# Patient Record
Sex: MALE | Race: WHITE | NOT HISPANIC OR LATINO | URBAN - METROPOLITAN AREA
[De-identification: names, ages, dates, MRNs, and addresses within clinical notes are randomized per-mention and may not be internally consistent; named-entity substitution may affect disease eponyms.]

---

## 2022-10-05 RX ADMIN — SODIUM CHLORIDE 1000 MILLILITER(S): 9 INJECTION INTRAMUSCULAR; INTRAVENOUS; SUBCUTANEOUS at 21:00

## 2022-10-06 ENCOUNTER — INPATIENT (INPATIENT)
Facility: HOSPITAL | Age: 87
LOS: 5 days | Discharge: HOME | DRG: 175 | End: 2022-10-12
Attending: STUDENT IN AN ORGANIZED HEALTH CARE EDUCATION/TRAINING PROGRAM | Admitting: STUDENT IN AN ORGANIZED HEALTH CARE EDUCATION/TRAINING PROGRAM
Payer: MEDICARE

## 2022-10-06 VITALS
DIASTOLIC BLOOD PRESSURE: 80 MMHG | WEIGHT: 199.96 LBS | SYSTOLIC BLOOD PRESSURE: 120 MMHG | HEART RATE: 81 BPM | RESPIRATION RATE: 18 BRPM | OXYGEN SATURATION: 95 % | TEMPERATURE: 97 F

## 2022-10-06 LAB
ALBUMIN SERPL ELPH-MCNC: 3.4 G/DL — SIGNIFICANT CHANGE UP (ref 3.3–5)
ALP SERPL-CCNC: 81 U/L — SIGNIFICANT CHANGE UP (ref 40–120)
ALT FLD-CCNC: 54 U/L — SIGNIFICANT CHANGE UP (ref 12–78)
ANION GAP SERPL CALC-SCNC: 9 MMOL/L — SIGNIFICANT CHANGE UP (ref 5–17)
AST SERPL-CCNC: 31 U/L — SIGNIFICANT CHANGE UP (ref 15–37)
BASOPHILS # BLD AUTO: 0.02 K/UL — SIGNIFICANT CHANGE UP (ref 0–0.2)
BASOPHILS NFR BLD AUTO: 0.1 % — SIGNIFICANT CHANGE UP (ref 0–2)
BILIRUB SERPL-MCNC: 0.9 MG/DL — SIGNIFICANT CHANGE UP (ref 0.2–1.2)
BUN SERPL-MCNC: 25 MG/DL — HIGH (ref 7–23)
CALCIUM SERPL-MCNC: 9.5 MG/DL — SIGNIFICANT CHANGE UP (ref 8.5–10.1)
CHLORIDE SERPL-SCNC: 104 MMOL/L — SIGNIFICANT CHANGE UP (ref 96–108)
CO2 SERPL-SCNC: 25 MMOL/L — SIGNIFICANT CHANGE UP (ref 22–31)
CREAT SERPL-MCNC: 1.2 MG/DL — SIGNIFICANT CHANGE UP (ref 0.5–1.3)
D DIMER BLD IA.RAPID-MCNC: 3527 NG/ML DDU — HIGH
EGFR: 59 ML/MIN/1.73M2 — LOW
EOSINOPHIL # BLD AUTO: 0 K/UL — SIGNIFICANT CHANGE UP (ref 0–0.5)
EOSINOPHIL NFR BLD AUTO: 0 % — SIGNIFICANT CHANGE UP (ref 0–6)
FLUAV AG NPH QL: SIGNIFICANT CHANGE UP
FLUBV AG NPH QL: SIGNIFICANT CHANGE UP
GLUCOSE SERPL-MCNC: 143 MG/DL — HIGH (ref 70–99)
HCT VFR BLD CALC: 39.5 % — SIGNIFICANT CHANGE UP (ref 39–50)
HGB BLD-MCNC: 12.8 G/DL — LOW (ref 13–17)
IMM GRANULOCYTES NFR BLD AUTO: 0.8 % — SIGNIFICANT CHANGE UP (ref 0–0.9)
LIDOCAIN IGE QN: 54 U/L — LOW (ref 73–393)
LYMPHOCYTES # BLD AUTO: 1.83 K/UL — SIGNIFICANT CHANGE UP (ref 1–3.3)
LYMPHOCYTES # BLD AUTO: 11 % — LOW (ref 13–44)
MCHC RBC-ENTMCNC: 29.8 PG — SIGNIFICANT CHANGE UP (ref 27–34)
MCHC RBC-ENTMCNC: 32.4 GM/DL — SIGNIFICANT CHANGE UP (ref 32–36)
MCV RBC AUTO: 92.1 FL — SIGNIFICANT CHANGE UP (ref 80–100)
MONOCYTES # BLD AUTO: 2.22 K/UL — HIGH (ref 0–0.9)
MONOCYTES NFR BLD AUTO: 13.3 % — SIGNIFICANT CHANGE UP (ref 2–14)
NEUTROPHILS # BLD AUTO: 12.49 K/UL — HIGH (ref 1.8–7.4)
NEUTROPHILS NFR BLD AUTO: 74.8 % — SIGNIFICANT CHANGE UP (ref 43–77)
NRBC # BLD: 0 /100 WBCS — SIGNIFICANT CHANGE UP (ref 0–0)
NT-PROBNP SERPL-SCNC: 158 PG/ML — SIGNIFICANT CHANGE UP (ref 0–450)
PLATELET # BLD AUTO: 289 K/UL — SIGNIFICANT CHANGE UP (ref 150–400)
POTASSIUM SERPL-MCNC: 4.2 MMOL/L — SIGNIFICANT CHANGE UP (ref 3.5–5.3)
POTASSIUM SERPL-SCNC: 4.2 MMOL/L — SIGNIFICANT CHANGE UP (ref 3.5–5.3)
PROT SERPL-MCNC: 8.3 G/DL — SIGNIFICANT CHANGE UP (ref 6–8.3)
RBC # BLD: 4.29 M/UL — SIGNIFICANT CHANGE UP (ref 4.2–5.8)
RBC # FLD: 13.1 % — SIGNIFICANT CHANGE UP (ref 10.3–14.5)
RSV RNA NPH QL NAA+NON-PROBE: SIGNIFICANT CHANGE UP
SARS-COV-2 RNA SPEC QL NAA+PROBE: SIGNIFICANT CHANGE UP
SODIUM SERPL-SCNC: 138 MMOL/L — SIGNIFICANT CHANGE UP (ref 135–145)
TROPONIN I, HIGH SENSITIVITY RESULT: 32.4 NG/L — SIGNIFICANT CHANGE UP
WBC # BLD: 16.69 K/UL — HIGH (ref 3.8–10.5)
WBC # FLD AUTO: 16.69 K/UL — HIGH (ref 3.8–10.5)

## 2022-10-06 PROCEDURE — 93010 ELECTROCARDIOGRAM REPORT: CPT

## 2022-10-06 PROCEDURE — 99291 CRITICAL CARE FIRST HOUR: CPT | Mod: CS

## 2022-10-06 PROCEDURE — 71045 X-RAY EXAM CHEST 1 VIEW: CPT | Mod: 26

## 2022-10-06 RX ORDER — SODIUM CHLORIDE 9 MG/ML
1000 INJECTION INTRAMUSCULAR; INTRAVENOUS; SUBCUTANEOUS ONCE
Refills: 0 | Status: COMPLETED | OUTPATIENT
Start: 2022-10-06 | End: 2022-10-06

## 2022-10-06 RX ORDER — PANTOPRAZOLE SODIUM 20 MG/1
40 TABLET, DELAYED RELEASE ORAL ONCE
Refills: 0 | Status: COMPLETED | OUTPATIENT
Start: 2022-10-06 | End: 2022-10-06

## 2022-10-06 RX ADMIN — SODIUM CHLORIDE 1000 MILLILITER(S): 9 INJECTION INTRAMUSCULAR; INTRAVENOUS; SUBCUTANEOUS at 20:00

## 2022-10-06 RX ADMIN — PANTOPRAZOLE SODIUM 40 MILLIGRAM(S): 20 TABLET, DELAYED RELEASE ORAL at 20:00

## 2022-10-06 NOTE — ED PROVIDER NOTE - PROGRESS NOTE DETAILS
Patient reassessed.  Found to have bilateral PE with pneumonia.  Also found to be febrile here.  Started on broad-spectrum antibiotics.  We will plan to start heparin drip.  initial trop and probnp wnl. I spoke with ICU PA who recommended that I call for possible transfer.  I spoke to [facility] team dr. pearl as well as [facility] team dr. ashby who recommend keeping patient here for heparin drip as his vital signs are stable at this time.  I updated ICU PA as well as Dr. Rodríguez.  Plan to admit to ICU.

## 2022-10-06 NOTE — ED PROVIDER NOTE - ATTENDING CONTRIBUTION TO CARE
Upon my evaluation, this patient has a high probability of imminent or life-threatening deterioration which required my direct attention, intervention and personal management.  I have personally provided the amount of critical care time documented above excluding time spent on separate procedures. Elements for critical care include direct patient care (not related to procedure), additional history taking, interpretation of diagnostic studies, documentation, consultation with other physicians.

## 2022-10-06 NOTE — ED ADULT NURSE NOTE - NS ED NURSE LEVEL OF CONSCIOUSNESS ORIENTATION
What Type Of Note Output Would You Prefer (Optional)?: Standard Output Hpi Title: Evaluation of Skin Lesions How Severe Are Your Spot(S)?: mild Have Your Spot(S) Been Treated In The Past?: has not been treated Disoriented

## 2022-10-06 NOTE — ED ADULT NURSE REASSESSMENT NOTE - NS ED NURSE REASSESS COMMENT FT1
Pt received from CARON Johansen. Pt is confused. Pt is going down to CT scan now. plan of care updated with pt. will reassess.

## 2022-10-06 NOTE — ED PROVIDER NOTE - UNABLE TO OBTAIN
pt with confusion, hx memory issues - history provided via phone by wife Lima, also received fax regarding pulmonologist visit Dementia

## 2022-10-06 NOTE — ED PROVIDER NOTE - CLINICAL SUMMARY MEDICAL DECISION MAKING FREE TEXT BOX
I, Junior Lane MD, have seen and examined the patient on the date of service.  I agree with the JULIETA's assessment as written, with exceptions or additions as noted below or in a separate note. pt with pmh of hld and dementia is presenting with abdominal pain and sob. started a few days ago with some confusion as well. saw his physician who started abx and medicine for gastritis, however with persistent symptoms sent to ed for evaluation. his abdominal pain is worse with palpation and inspiration. on exam has epigastric ttp. a/p: with epigastric ttp, will eval for pancreatitis vs gb pathology. possible PE as well given he is endorsing pain worse with inspiration. will check trop for acs as well. given recent pna, plan to check xray for that as well. will obtain labs/ct and reassess.

## 2022-10-07 ENCOUNTER — TRANSCRIPTION ENCOUNTER (OUTPATIENT)
Age: 87
End: 2022-10-07

## 2022-10-07 DIAGNOSIS — F03.90 UNSPECIFIED DEMENTIA WITHOUT BEHAVIORAL DISTURBANCE: ICD-10-CM

## 2022-10-07 DIAGNOSIS — Z98.890 OTHER SPECIFIED POSTPROCEDURAL STATES: Chronic | ICD-10-CM

## 2022-10-07 DIAGNOSIS — I26.99 OTHER PULMONARY EMBOLISM WITHOUT ACUTE COR PULMONALE: ICD-10-CM

## 2022-10-07 DIAGNOSIS — Z29.9 ENCOUNTER FOR PROPHYLACTIC MEASURES, UNSPECIFIED: ICD-10-CM

## 2022-10-07 DIAGNOSIS — D72.829 ELEVATED WHITE BLOOD CELL COUNT, UNSPECIFIED: ICD-10-CM

## 2022-10-07 DIAGNOSIS — N40.0 BENIGN PROSTATIC HYPERPLASIA WITHOUT LOWER URINARY TRACT SYMPTOMS: ICD-10-CM

## 2022-10-07 DIAGNOSIS — I48.91 UNSPECIFIED ATRIAL FIBRILLATION: ICD-10-CM

## 2022-10-07 DIAGNOSIS — E78.5 HYPERLIPIDEMIA, UNSPECIFIED: ICD-10-CM

## 2022-10-07 LAB
ALBUMIN SERPL ELPH-MCNC: 2.7 G/DL — LOW (ref 3.3–5)
ALP SERPL-CCNC: 71 U/L — SIGNIFICANT CHANGE UP (ref 40–120)
ALT FLD-CCNC: 54 U/L — SIGNIFICANT CHANGE UP (ref 12–78)
ANION GAP SERPL CALC-SCNC: 7 MMOL/L — SIGNIFICANT CHANGE UP (ref 5–17)
APTT BLD: 39 SEC — HIGH (ref 27.5–35.5)
APTT BLD: >200 SEC — CRITICAL HIGH (ref 27.5–35.5)
AST SERPL-CCNC: 41 U/L — HIGH (ref 15–37)
BILIRUB SERPL-MCNC: 0.8 MG/DL — SIGNIFICANT CHANGE UP (ref 0.2–1.2)
BUN SERPL-MCNC: 18 MG/DL — SIGNIFICANT CHANGE UP (ref 7–23)
CALCIUM SERPL-MCNC: 8.9 MG/DL — SIGNIFICANT CHANGE UP (ref 8.5–10.1)
CHLORIDE SERPL-SCNC: 108 MMOL/L — SIGNIFICANT CHANGE UP (ref 96–108)
CO2 SERPL-SCNC: 27 MMOL/L — SIGNIFICANT CHANGE UP (ref 22–31)
CREAT SERPL-MCNC: 0.86 MG/DL — SIGNIFICANT CHANGE UP (ref 0.5–1.3)
EGFR: 84 ML/MIN/1.73M2 — SIGNIFICANT CHANGE UP
GLUCOSE SERPL-MCNC: 104 MG/DL — HIGH (ref 70–99)
HCT VFR BLD CALC: 34.9 % — LOW (ref 39–50)
HGB BLD-MCNC: 11.2 G/DL — LOW (ref 13–17)
INR BLD: 1.48 RATIO — HIGH (ref 0.88–1.16)
LACTATE SERPL-SCNC: 1.7 MMOL/L — SIGNIFICANT CHANGE UP (ref 0.7–2)
MCHC RBC-ENTMCNC: 29.6 PG — SIGNIFICANT CHANGE UP (ref 27–34)
MCHC RBC-ENTMCNC: 32.1 GM/DL — SIGNIFICANT CHANGE UP (ref 32–36)
MCV RBC AUTO: 92.3 FL — SIGNIFICANT CHANGE UP (ref 80–100)
NRBC # BLD: 0 /100 WBCS — SIGNIFICANT CHANGE UP (ref 0–0)
NT-PROBNP SERPL-SCNC: 660 PG/ML — HIGH (ref 0–450)
PLATELET # BLD AUTO: 207 K/UL — SIGNIFICANT CHANGE UP (ref 150–400)
POTASSIUM SERPL-MCNC: 4.1 MMOL/L — SIGNIFICANT CHANGE UP (ref 3.5–5.3)
POTASSIUM SERPL-SCNC: 4.1 MMOL/L — SIGNIFICANT CHANGE UP (ref 3.5–5.3)
PROT SERPL-MCNC: 6.8 G/DL — SIGNIFICANT CHANGE UP (ref 6–8.3)
PROTHROM AB SERPL-ACNC: 17.4 SEC — HIGH (ref 10.5–13.4)
PSA FLD-MCNC: 2.74 NG/ML — SIGNIFICANT CHANGE UP (ref 0–4)
RBC # BLD: 3.78 M/UL — LOW (ref 4.2–5.8)
RBC # FLD: 13 % — SIGNIFICANT CHANGE UP (ref 10.3–14.5)
SODIUM SERPL-SCNC: 142 MMOL/L — SIGNIFICANT CHANGE UP (ref 135–145)
TROPONIN I, HIGH SENSITIVITY RESULT: 89.2 NG/L — HIGH
WBC # BLD: 15.11 K/UL — HIGH (ref 3.8–10.5)
WBC # FLD AUTO: 15.11 K/UL — HIGH (ref 3.8–10.5)

## 2022-10-07 PROCEDURE — 93010 ELECTROCARDIOGRAM REPORT: CPT

## 2022-10-07 PROCEDURE — 71275 CT ANGIOGRAPHY CHEST: CPT | Mod: 26,MA

## 2022-10-07 PROCEDURE — 93306 TTE W/DOPPLER COMPLETE: CPT | Mod: 26

## 2022-10-07 PROCEDURE — G1004: CPT

## 2022-10-07 PROCEDURE — 70450 CT HEAD/BRAIN W/O DYE: CPT | Mod: 26,MA

## 2022-10-07 PROCEDURE — 93970 EXTREMITY STUDY: CPT | Mod: 26

## 2022-10-07 PROCEDURE — 74177 CT ABD & PELVIS W/CONTRAST: CPT | Mod: 26,ME

## 2022-10-07 PROCEDURE — 99233 SBSQ HOSP IP/OBS HIGH 50: CPT | Mod: GC

## 2022-10-07 PROCEDURE — 99223 1ST HOSP IP/OBS HIGH 75: CPT | Mod: GC,AI

## 2022-10-07 RX ORDER — ACETAMINOPHEN 500 MG
1000 TABLET ORAL ONCE
Refills: 0 | Status: COMPLETED | OUTPATIENT
Start: 2022-10-07 | End: 2022-10-07

## 2022-10-07 RX ORDER — CHLORHEXIDINE GLUCONATE 213 G/1000ML
1 SOLUTION TOPICAL
Refills: 0 | Status: DISCONTINUED | OUTPATIENT
Start: 2022-10-07 | End: 2022-10-12

## 2022-10-07 RX ORDER — APIXABAN 2.5 MG/1
1 TABLET, FILM COATED ORAL
Qty: 1 | Refills: 0
Start: 2022-10-07

## 2022-10-07 RX ORDER — FINASTERIDE 5 MG/1
5 TABLET, FILM COATED ORAL DAILY
Refills: 0 | Status: DISCONTINUED | OUTPATIENT
Start: 2022-10-07 | End: 2022-10-12

## 2022-10-07 RX ORDER — HEPARIN SODIUM 5000 [USP'U]/ML
INJECTION INTRAVENOUS; SUBCUTANEOUS
Qty: 25000 | Refills: 0 | Status: DISCONTINUED | OUTPATIENT
Start: 2022-10-07 | End: 2022-10-07

## 2022-10-07 RX ORDER — HEPARIN SODIUM 5000 [USP'U]/ML
7500 INJECTION INTRAVENOUS; SUBCUTANEOUS ONCE
Refills: 0 | Status: COMPLETED | OUTPATIENT
Start: 2022-10-07 | End: 2022-10-07

## 2022-10-07 RX ORDER — VANCOMYCIN HCL 1 G
1000 VIAL (EA) INTRAVENOUS ONCE
Refills: 0 | Status: COMPLETED | OUTPATIENT
Start: 2022-10-07 | End: 2022-10-07

## 2022-10-07 RX ORDER — HEPARIN SODIUM 5000 [USP'U]/ML
3500 INJECTION INTRAVENOUS; SUBCUTANEOUS EVERY 6 HOURS
Refills: 0 | Status: DISCONTINUED | OUTPATIENT
Start: 2022-10-07 | End: 2022-10-07

## 2022-10-07 RX ORDER — PANTOPRAZOLE SODIUM 20 MG/1
40 TABLET, DELAYED RELEASE ORAL DAILY
Refills: 0 | Status: DISCONTINUED | OUTPATIENT
Start: 2022-10-07 | End: 2022-10-08

## 2022-10-07 RX ORDER — HEPARIN SODIUM 5000 [USP'U]/ML
7500 INJECTION INTRAVENOUS; SUBCUTANEOUS EVERY 6 HOURS
Refills: 0 | Status: DISCONTINUED | OUTPATIENT
Start: 2022-10-07 | End: 2022-10-07

## 2022-10-07 RX ORDER — SODIUM CHLORIDE 9 MG/ML
1000 INJECTION INTRAMUSCULAR; INTRAVENOUS; SUBCUTANEOUS ONCE
Refills: 0 | Status: COMPLETED | OUTPATIENT
Start: 2022-10-07 | End: 2022-10-07

## 2022-10-07 RX ORDER — INFLUENZA VIRUS VACCINE 15; 15; 15; 15 UG/.5ML; UG/.5ML; UG/.5ML; UG/.5ML
0.7 SUSPENSION INTRAMUSCULAR ONCE
Refills: 0 | Status: DISCONTINUED | OUTPATIENT
Start: 2022-10-07 | End: 2022-10-12

## 2022-10-07 RX ORDER — SIMVASTATIN 20 MG/1
20 TABLET, FILM COATED ORAL AT BEDTIME
Refills: 0 | Status: DISCONTINUED | OUTPATIENT
Start: 2022-10-07 | End: 2022-10-12

## 2022-10-07 RX ORDER — APIXABAN 2.5 MG/1
10 TABLET, FILM COATED ORAL EVERY 12 HOURS
Refills: 0 | Status: DISCONTINUED | OUTPATIENT
Start: 2022-10-07 | End: 2022-10-12

## 2022-10-07 RX ORDER — METOPROLOL TARTRATE 50 MG
5 TABLET ORAL ONCE
Refills: 0 | Status: COMPLETED | OUTPATIENT
Start: 2022-10-07 | End: 2022-10-07

## 2022-10-07 RX ORDER — RIVASTIGMINE 4.6 MG/24H
1 PATCH, EXTENDED RELEASE TRANSDERMAL EVERY 24 HOURS
Refills: 0 | Status: DISCONTINUED | OUTPATIENT
Start: 2022-10-07 | End: 2022-10-12

## 2022-10-07 RX ORDER — PIPERACILLIN AND TAZOBACTAM 4; .5 G/20ML; G/20ML
3.38 INJECTION, POWDER, LYOPHILIZED, FOR SOLUTION INTRAVENOUS ONCE
Refills: 0 | Status: COMPLETED | OUTPATIENT
Start: 2022-10-07 | End: 2022-10-07

## 2022-10-07 RX ADMIN — APIXABAN 10 MILLIGRAM(S): 2.5 TABLET, FILM COATED ORAL at 21:38

## 2022-10-07 RX ADMIN — HEPARIN SODIUM 7500 UNIT(S): 5000 INJECTION INTRAVENOUS; SUBCUTANEOUS at 02:39

## 2022-10-07 RX ADMIN — SODIUM CHLORIDE 1000 MILLILITER(S): 9 INJECTION INTRAMUSCULAR; INTRAVENOUS; SUBCUTANEOUS at 02:48

## 2022-10-07 RX ADMIN — PANTOPRAZOLE SODIUM 40 MILLIGRAM(S): 20 TABLET, DELAYED RELEASE ORAL at 11:03

## 2022-10-07 RX ADMIN — FINASTERIDE 5 MILLIGRAM(S): 5 TABLET, FILM COATED ORAL at 11:03

## 2022-10-07 RX ADMIN — PIPERACILLIN AND TAZOBACTAM 200 GRAM(S): 4; .5 INJECTION, POWDER, LYOPHILIZED, FOR SOLUTION INTRAVENOUS at 02:09

## 2022-10-07 RX ADMIN — HEPARIN SODIUM 0 UNIT(S)/HR: 5000 INJECTION INTRAVENOUS; SUBCUTANEOUS at 10:12

## 2022-10-07 RX ADMIN — RIVASTIGMINE 1 PATCH: 4.6 PATCH, EXTENDED RELEASE TRANSDERMAL at 13:04

## 2022-10-07 RX ADMIN — Medication 250 MILLIGRAM(S): at 02:47

## 2022-10-07 RX ADMIN — HEPARIN SODIUM 1700 UNIT(S)/HR: 5000 INJECTION INTRAVENOUS; SUBCUTANEOUS at 02:39

## 2022-10-07 RX ADMIN — Medication 400 MILLIGRAM(S): at 02:09

## 2022-10-07 RX ADMIN — RIVASTIGMINE 1 PATCH: 4.6 PATCH, EXTENDED RELEASE TRANSDERMAL at 19:15

## 2022-10-07 RX ADMIN — SIMVASTATIN 20 MILLIGRAM(S): 20 TABLET, FILM COATED ORAL at 21:38

## 2022-10-07 RX ADMIN — APIXABAN 10 MILLIGRAM(S): 2.5 TABLET, FILM COATED ORAL at 11:08

## 2022-10-07 RX ADMIN — Medication 1000 MILLIGRAM(S): at 02:50

## 2022-10-07 NOTE — DIETITIAN INITIAL EVALUATION ADULT - OTHER INFO
86 yo M with PMH HLD, BPH and dementia. As per wife/chart, pt recently treated for PNA but symptoms have been worsening. Pt has been increasingly confused with a decrease appetite for the past few days. Pt was also complaining about abdominal pain along with back pain and SOB.   Presented to the ED with abdominal pain. As per chart, pt reports improvement in his abdominal pain, denies fever, chills, palpitations, cough, nausea, vomiting.   Pt now w/ elevated troponin and pro BNP, runs of sinus tach, remains HD stable.  86 yo M with PMHx HLD, BPH and dementia. As per wife/chart, pt recently treated for PNA but symptoms have been worsening. Pt has been increasingly confused with a decrease appetite for the past few days. Pt was also complaining about abdominal pain along with back pain and SOB.   Presented to the ED with abdominal pain. As per chart, pt reports improvement in his abdominal pain, denies fever, chills, palpitations, cough, nausea, vomiting.   Pt now w/ elevated troponin and pro BNP, runs of sinus tach, remains HD stable. Bilateral pulmonary embolism with pneumonia.  DNR/DNI with no mention of tube feeding.   88 yo M with PMHx HLD, BPH and dementia. As per wife/chart, pt recently treated for PNA but symptoms have been worsening. Pt has been increasingly confused with a decrease appetite for the past few days. Pt was also complaining about abdominal pain along with back pain and SOB.   Presented to the ED with abdominal pain. As per chart, pt reports improvement in his abdominal pain, denies fever, chills, palpitations, cough, nausea, vomiting.   Pt now w/ elevated troponin and pro BNP, runs of sinus tachycardia, remains HD stable. Bilateral pulmonary embolism with pneumonia, suggestion of right heart strain.      DNR/DNI with no mention of tube feeding.   86 yo M with PMHx HLD, BPH and dementia. As per wife/chart, pt recently treated for PNA but symptoms have been worsening. Pt has been increasingly confused with a decrease appetite for the past few days. Pt was also complaining about abdominal pain along with back pain and SOB.   Presented to the ED with abdominal pain. As per chart, pt reports improvement in his abdominal pain, denies fever, chills, palpitations, cough, nausea, vomiting.   Pt now w/ elevated troponin and pro BNP, runs of sinus tachycardia, remains HD stable. Bilateral pulmonary embolism with pneumonia, suggestion of right heart strain.   DNR/DNI with no mention of tube feeding.    Pt reports he has not had a BM in 2 weeks due to minimal PO 2/2 lung pain.

## 2022-10-07 NOTE — H&P ADULT - NSHPPHYSICALEXAM_GEN_ALL_CORE
T(C): 37.3 (10-07-22 @ 04:05), Max: 38.8 (10-06-22 @ 23:44)  HR: 132 (10-07-22 @ 04:00) (70 - 141)  BP: 132/73 (10-07-22 @ 04:00) (110/62 - 132/73)  RR: 35 (10-07-22 @ 04:00) (16 - 35)  SpO2: 95% (10-07-22 @ 04:00) (94% - 96%)    GENERAL: patient appears well, no acute distress, appropriately interactive  EYES: sclera clear, no exudates  ENMT: oropharynx clear without erythema, no exudates, moist mucous membranes  NECK: supple, soft  LUNGS: good air entry bilaterally, clear to auscultation, symmetric breath sounds, no wheezing or rhonchi appreciated  HEART: soft S1/S2, regular rate and rhythm, no murmurs noted, no lower extremity edema  GASTROINTESTINAL: abdomen is soft, nontender, nondistended, normoactive bowel sounds  INTEGUMENT: good skin turgor, warm skin, appears well perfused  MUSCULOSKELETAL: no clubbing or cyanosis, no obvious deformity  NEUROLOGIC: awake, alert, oriented x3, good muscle tone in all 4 extremities  HEME/LYMPH: no obvious ecchymosis or petechiae T(C): 37.3 (10-07-22 @ 04:05), Max: 38.8 (10-06-22 @ 23:44)  HR: 132 (10-07-22 @ 04:00) (70 - 141)  BP: 132/73 (10-07-22 @ 04:00) (110/62 - 132/73)  RR: 35 (10-07-22 @ 04:00) (16 - 35)  SpO2: 95% (10-07-22 @ 04:00) (94% - 96%)    GENERAL: patient appears well, no acute distress, pleasantly confused   EYES: sclera clear, no exudates  ENMT: oropharynx clear without erythema, no exudates, moist mucous membranes  NECK: supple, soft  LUNGS: good air entry bilaterally, clear to auscultation, symmetric breath sounds, no wheezing or rhonchi appreciated  HEART: soft S1/S2, regular rate and rhythm, no murmurs noted, no lower extremity edema  GASTROINTESTINAL: abdomen is soft, nontender, nondistended, normoactive bowel sounds  INTEGUMENT: good skin turgor, warm skin, appears well perfused  MUSCULOSKELETAL: no clubbing or cyanosis  NEUROLOGIC: awake, alert, oriented x2, good muscle tone in all 4 extremities, sensation intact T(C): 37.3 (10-07-22 @ 04:05), Max: 38.8 (10-06-22 @ 23:44)  HR: 132 (10-07-22 @ 04:00) (70 - 141)  BP: 132/73 (10-07-22 @ 04:00) (110/62 - 132/73)  RR: 35 (10-07-22 @ 04:00) (16 - 35)  SpO2: 95% (10-07-22 @ 04:00) (94% - 96%)    GENERAL: patient appears well, no acute distress, pleasantly confused   EYES: sclera clear, no exudates  ENMT: oropharynx clear without erythema, no exudates, moist mucous membranes  NECK: supple, soft  LUNGS: good air entry bilaterally, clear to auscultation, symmetric breath sounds, no wheezing or rhonchi appreciated  HEART: soft S1/S2, tachycardia, no murmurs noted, no lower extremity edema  GASTROINTESTINAL: abdomen is soft, nontender, nondistended, normoactive bowel sounds  INTEGUMENT: good skin turgor, warm skin, appears well perfused  MUSCULOSKELETAL: no clubbing or cyanosis  NEUROLOGIC: awake, alert, oriented x2, good muscle tone in all 4 extremities, sensation intact

## 2022-10-07 NOTE — H&P ADULT - PROBLEM SELECTOR PLAN 2
-  HR: 83-->141-->132, pt went into rapid afib in the ICU   - s/p Lopressor 5 mg IVP x1, now rate controlled, HR: 80s  - trop: 32.4-->89.2 likely demand, trend to peak   - Pt is on Heparin drip for BL DVT, continue   - Recommend Cardio eval -  HR: 83-->141-->132, pt went into rapid afib in the ICU   - initial EKG: sinus tachycardia, HR: 101   - s/p Lopressor 5 mg IVP x1, now rate controlled, HR: 80s, continue to monitor   - trop: 32.4-->89.2 likely demand, trend to peak   - Pt is on Heparin drip for BL DVT, continue   - Recommend Cardio eval -  HR: 83-->141-->132, pt went into rapid afib in the ICU   - initial EKG: sinus tachycardia, HR: 101   - s/p Lopressor 5 mg IVP x1, now rate controlled, HR: 80s, continue to monitor   - trop: 32.4-->89.2 likely demand, trend to peak   - Pt is on Heparin drip for BL PE, continue   - Recommend Cardio eval

## 2022-10-07 NOTE — H&P ADULT - PROBLEM SELECTOR PLAN 3
- s/p  Zosyn x1 Vanco x1, 1L NS bolus x1 in the ED   - CT Angio Chest: Somewhat consolidative opacities in bilateral lower lobes, raising concern for developing pneumonia. Pulmonary infarcts considered in the differential. Trace left pleural effusion. Few bilateral sub-pleural based nodules, largest measuring up to 5mm in left lower lobe. wbc: 16.69 on admission, afebrile; pt was recently treated for PNA   - s/p  Zosyn x1 Vanco x1, 1L NS bolus x1 in the ED   - CT Angio Chest: Somewhat consolidative opacities in bilateral lower lobes, raising concern for developing pneumonia. Pulmonary infarcts considered in the differential. Trace left pleural effusion. Few bilateral sub-pleural based nodules, largest measuring up to 5mm in left lower lobe.  - Monitor leukocytosis and fevers  - Will monitor off of abx for now   - F/u BCx x2, MRSA, procal, ESR/CRP, markers of infections

## 2022-10-07 NOTE — H&P ADULT - PROBLEM SELECTOR PLAN 6
Chronic   - CT A/P: Heterogeneously enlarged prostate, cause mass effect and protrusion at the bladder base. - Would recommend to correlate with PSA and pt may follow up outpt with workup to exclude underlying prostatic malignancy. Chronic   - Pt is on home Finasteride 5 mg QD  - CT A/P: Heterogeneously enlarged prostate, cause mass effect and protrusion at the bladder base. - Would recommend to correlate with PSA and pt may follow up outpt with workup to exclude underlying prostatic malignancy.

## 2022-10-07 NOTE — DIETITIAN NUTRITION RISK NOTIFICATION - MALNUTRITION EVALUATION AS DEMONSTRATED BY (ADULTS > 20 YEARS OF AGE)
Weight loss.../Inadequate energy intake... Weight loss.../Inadequate energy intake.../Loss of subcutaneous fat.../Loss of muscle...

## 2022-10-07 NOTE — DIETITIAN INITIAL EVALUATION ADULT - DIET TYPE
When medically feasible, advance to Regular diet, add Vanilla Ensure BID/regular/low fat When medically feasible, advance to Regular diet, add Vanilla Ensure BID/regular

## 2022-10-07 NOTE — DIETITIAN NUTRITION RISK NOTIFICATION - TREATMENT: THE FOLLOWING DIET HAS BEEN RECOMMENDED
Diet, NPO:   Except Medications     Special Instructions for Nursing:  Except Medications (10-07-22 @ 01:57) [Active]

## 2022-10-07 NOTE — DISCHARGE NOTE NURSING/CASE MANAGEMENT/SOCIAL WORK - PATIENT PORTAL LINK FT
You can access the FollowMyHealth Patient Portal offered by [facility] by registering at the following website: [email or web address]. By joining [facility]’s FollowMyHealth portal, you will also be able to view your health information using other applications (apps) compatible with our system.

## 2022-10-07 NOTE — ED ADULT NURSE REASSESSMENT NOTE - NS ED NURSE REASSESS COMMENT FT1
Pt urinated in depends. Pt cleaned, turned and repositioned. sacrum is intact. Pt urinated in depends. Pt cleaned, turned and repositioned. sacrum is intact. eye glasses placed in belongings bag.

## 2022-10-07 NOTE — DIETITIAN INITIAL EVALUATION ADULT - ORAL INTAKE PTA/DIET HISTORY
Pt currently NPO.   Pt seen at bedside. Reports decreased appetite for 1 month, has eaten "hardly anything" 2/2 lung pain. Reports wt loss: UBW 210lb, CBW 198lb. NKFA  Pt usual intake (prior to 1 mo ago) primarily refined carbohydrates/ processed foods, lacking PUFAs, F&V  B: 2 eggs, bagel, hot water  L: skips, pizza 1x week  D: Salad, chicken, Chinese or Italian takeout

## 2022-10-07 NOTE — PROGRESS NOTE ADULT - ASSESSMENT
Pt is a 86 y/o M pmhx of BPH, HLD who presents to [facility] ED w/ complaints of abdominal pain, worse w/ inspiration. In ED pt was found to have elevated D-dimer, CTA + for B/L PE's w/ concern for RV strain, Troponin and Pro BNP negative. Called for potential transfer for higher level of care but no intervention required at this time.    1. Pulmonary embolism   2. Leukocytosis     Plan:     Neuro: Awake and alert at baseline, avoid sedating medications     CV: RV strain on CT scan, official TTE in AM to eval for RV strain, will trend Pro BNP and troponin for signs of additional strain.     Pulm: B/L Pulmonary embolisms seen on CT scan w/ potential RV strain, will start on heparin gtt for full dose AC, supplement O2 as needed for SpO2>90%.     GI: Diet: NPO Protonix for GI PPX     Renal: No active issues, continue to monitor and avoid nephrotoxic meds.     Endo: Glucose <180, Mag>2, K>4 for arrythmia suppression     Heme: Full dose AC w/ heparin gtt, monitor via ptt, and titrate gtt accordingly. B/L lower extremity dopplers to evaluate for additional DVT's.     ID: Leukocytosis, on routine labs, continue to trend markers of infection and will start on abx if indicated.     Case discussed w/ EICU attending  Pt is a 86 y/o M pmhx of BPH, HLD who presents to [facility] ED w/ complaints of abdominal pain, worse w/ inspiration. In ED pt was found to have elevated D-dimer, CTA + for B/L PE's w/ concern for RV strain, Troponin and Pro BNP negative. Called for potential transfer for higher level of care but no intervention required at this time.    1. Pulmonary embolism   2. Leukocytosis     Plan:     Neuro: Awake and alert at baseline, avoid sedating medications     CV: RV strain on CT scan, official TTE in AM to eval for RV strain, will trend Pro BNP and troponin for signs of additional strain.     Pulm: B/L Pulmonary embolisms seen on CT scan w/ potential RV strain. supplement O2 as needed for SpO2>90%. Heparin discontinued and changed to oral eliquis 10 milligrams every 12 hours for 7 days.     GI: Diet: NPO Protonix for GI PPX     Renal: No active issues, continue to monitor and avoid nephrotoxic meds. Speak to his urologist Dr. Sebas Ron as to why patient sees him every 6 months and discuss findings on patient's Ct abdomen.     Endo: Glucose <180, Mag>2, K>4 for arrythmia suppression     Heme: discontinued heparin and switched to eliquis 10 milligrams every 12 hours for 7 days. B/L lower extremity dopplers to evaluate for additional DVT's.     ID: Leukocytosis, on routine labs, continue to trend markers of infection and will start on abx if indicated.     Case discussed w/ EICU attending  Pt is a 88 y/o M pmhx of BPH, HLD who presents to [facility] ED w/ complaints of abdominal pain, worse w/ inspiration. In ED pt was found to have elevated D-dimer, CTA + for B/L PE's w/ concern for RV strain, Troponin and Pro BNP negative.    1. Pulmonary embolism   2. Leukocytosis     Plan:     Neuro: Awake and alert at baseline, avoid sedating medications     CV: RV strain on CT scan, official TTE in AM to eval for RV strain, will trend Pro BNP and troponin for signs of additional strain.     Pulm: B/L Pulmonary embolisms seen on CT scan w/ potential RV strain. supplement O2 as needed for SpO2>90%. Heparin discontinued and changed to oral eliquis 10 milligrams every 12 hours for 7 days.     GI: Diet: NPO Protonix for GI PPX     Renal: No active issues, continue to monitor and avoid nephrotoxic meds. Speak to his urologist Dr. Sebas Ron as to why patient sees him every 6 months and discuss findings on patient's Ct abdomen.     Endo: Glucose <180, Mag>2, K>4 for arrythmia suppression     Heme: discontinued heparin and switched to eliquis 10 milligrams every 12 hours for 7 days. B/L lower extremity dopplers to evaluate for additional DVT's.     ID: Leukocytosis, on routine labs, continue to trend markers of infection and will start on abx if indicated.     Case discussed w/ EICU attending

## 2022-10-07 NOTE — PROGRESS NOTE ADULT - SUBJECTIVE AND OBJECTIVE BOX
Patient is a 87y old  Male who presents with a chief complaint of abdominal pain.     BRIEF HOSPITAL COURSE: Pt is a 86 y/o M pmhx of BPH, HLD who presents to [facility] w/ complaints of abdominal pain, worse w/ inspiration. In ED pt was found to have elevated D-dimer, CTA + for B/L PE's w/ concern for RV strain, Troponin and Pro BNP negative. Called for potential transfer for higher level of care but no intervention required at this time. ICU consulted for further eval and management.     PAST MEDICAL & SURGICAL HISTORY:  HLD (hyperlipidemia)      Enlarged prostate        Allergies    No Known Allergies    Intolerances      FAMILY HISTORY:      Review of Systems:  CONSTITUTIONAL: No fever, chills, or fatigue  EYES: No eye pain, visual disturbances, or discharge  ENMT:  No difficulty hearing, tinnitus, vertigo; No sinus or throat pain  NECK: No pain or stiffness  RESPIRATORY: No cough, wheezing, chills or hemoptysis; No shortness of breath  CARDIOVASCULAR: No chest pain, palpitations, dizziness, or leg swelling  GASTROINTESTINAL: + abdominal No epigastric pain. No nausea, vomiting, or hematemesis; No diarrhea or constipation. No melena or hematochezia.  GENITOURINARY: No dysuria, frequency, hematuria, or incontinence  NEUROLOGICAL: No headaches, memory loss, loss of strength, numbness, or tremors  SKIN: No itching, burning, rashes, or lesions   MUSCULOSKELETAL: No joint pain or swelling; No muscle, back, or extremity pain  PSYCHIATRIC: No depression, anxiety, mood swings, or difficulty sleeping      Medications:  vancomycin  IVPB. 1000 milliGRAM(s) IV Intermittent once            heparin   Injectable 7500 Unit(s) IV Push once  heparin   Injectable 7500 Unit(s) IV Push every 6 hours PRN  heparin   Injectable 3500 Unit(s) IV Push every 6 hours PRN  heparin  Infusion.  Unit(s)/Hr IV Continuous <Continuous>    pantoprazole  Injectable 40 milliGRAM(s) IV Push daily        sodium chloride 0.9% Bolus 1000 milliLiter(s) IV Bolus once      chlorhexidine 2% Cloths 1 Application(s) Topical <User Schedule>            ICU Vital Signs Last 24 Hrs  T(C): 38.8 (06 Oct 2022 23:44), Max: 38.8 (06 Oct 2022 23:44)  T(F): 101.8 (06 Oct 2022 23:44), Max: 101.8 (06 Oct 2022 23:44)  HR: 86 (06 Oct 2022 23:44) (81 - 86)  BP: 130/81 (06 Oct 2022 23:44) (120/80 - 130/81)  BP(mean): --  ABP: --  ABP(mean): --  RR: 16 (06 Oct 2022 23:44) (16 - 18)  SpO2: 94% (06 Oct 2022 23:44) (94% - 95%)    O2 Parameters below as of 06 Oct 2022 23:44  Patient On (Oxygen Delivery Method): room air          Vital Signs Last 24 Hrs  T(C): 38.8 (06 Oct 2022 23:44), Max: 38.8 (06 Oct 2022 23:44)  T(F): 101.8 (06 Oct 2022 23:44), Max: 101.8 (06 Oct 2022 23:44)  HR: 86 (06 Oct 2022 23:44) (81 - 86)  BP: 130/81 (06 Oct 2022 23:44) (120/80 - 130/81)  BP(mean): --  RR: 16 (06 Oct 2022 23:44) (16 - 18)  SpO2: 94% (06 Oct 2022 23:44) (94% - 95%)    Parameters below as of 06 Oct 2022 23:44  Patient On (Oxygen Delivery Method): room air            I&O's Detail        LABS:                        12.8   16.69 )-----------( 289      ( 06 Oct 2022 19:25 )             39.5     10-06    138  |  104  |  25<H>  ----------------------------<  143<H>  4.2   |  25  |  1.20    Ca    9.5      06 Oct 2022 19:25    TPro  8.3  /  Alb  3.4  /  TBili  0.9  /  DBili  x   /  AST  31  /  ALT  54  /  AlkPhos  81  10-06          CAPILLARY BLOOD GLUCOSE        PT/INR - ( 07 Oct 2022 01:50 )   PT: 17.4 sec;   INR: 1.48 ratio         PTT - ( 07 Oct 2022 01:50 )  PTT:39.0 sec    CULTURES:      Physical Examination:    General: Pt laying in hospital bed in no acute distress.  Alert, oriented, interactive.     HEENT: Pupils equal, reactive to light.  Symmetric.    PULM: Clear to auscultation bilaterally, no significant sputum production    CVS: Regular rate and rhythm, no murmurs, rubs, or gallops    ABD: Soft, nondistended, nontender, normoactive bowel sounds, no masses    EXT: No edema, nontender    SKIN: Warm and well perfused.     Neuro: A/O X4, moving all extremities well     RADIOLOGY:   < from: CT Angio Chest PE Protocol w/ IV Cont (10.07.22 @ 00:44) >  IMPRESSION:    Findings compatible with bilateral pulmonary emboli with suggestion of   right heart strain as detailed above.    Somewhat consolidative opacities in bilateral lower lobes, raising   concern for developing pneumonia. Pulmonary infarcts considered in the   differential. Pulmonary imaging follow-up in 6 weeks advised demonstrate   resolution.    Trace left pleural effusion.    Few bilateral sub-pleural based nodules, largest measuring up to 5mm in   left lower lobe. Pulmonary imaging follow-up in one year is advised if   the patient is increased risk for neoplasm.    Heterogeneously enlarged prostate, cause mass effect and protrusion at   the bladder base. Correlate with PSA and further nonemergentworkup to   exclude underlying prostatic malignancy.    Additional findings as mentioned above.    These critical results were discussed via telephone at 10/7/2022 1:03 AM   by Dr. Escudero of radiology with Dr. Lane, read-back was followed.    --- End of Report ---            SHASTA ESCUDERO MD; Attending Radiologist  This document has been electronically signed. Oct  7 2022  1:05AM       Patient is a 87y old  Male who presents with a chief complaint of abdominal pain.     BRIEF HOSPITAL COURSE: Pt is a 86 y/o M pmhx of BPH, HLD who presents to [facility] ED w/ complaints of abdominal pain, worse w/ inspiration. In ED pt was found to have elevated D-dimer, CTA + for B/L PE's w/ concern for RV strain, Troponin and Pro BNP negative. Called for potential transfer for higher level of care but no intervention required at this time. ICU consulted for further eval and management.     Interval Course: Patient was seen at bedside and is doing well. He no longer has any abdominal pain. He states that he only has a chest pain at the end of inspiration. He stated that he goes to a urologist every 6 months but is unsure why exactly he goes so frequently. His urologists name is Dr. Sebas Ron and he last saw him 4 months ago. Ultrasound was done at bedside that showed mild right heart strain.     PAST MEDICAL & SURGICAL HISTORY:  HLD (hyperlipidemia)      Enlarged prostate        Allergies    No Known Allergies    Intolerances      FAMILY HISTORY:      Review of Systems:  CONSTITUTIONAL: No fever, chills, or fatigue  EYES: No eye pain, visual disturbances, or discharge  ENMT:  No difficulty hearing, tinnitus, vertigo; No sinus or throat pain  NECK: No pain or stiffness  RESPIRATORY: No cough, wheezing, chills or hemoptysis; No shortness of breath  CARDIOVASCULAR: chest pain at the end of deep inspiration  GASTROINTESTINAL: + abdominal No epigastric pain. No nausea, vomiting, or hematemesis; No diarrhea or constipation. No melena or hematochezia.  GENITOURINARY: No dysuria, frequency, hematuria, or incontinence  NEUROLOGICAL:  AAOX4. No headaches, memory loss, loss of strength, numbness, or tremors  SKIN: No itching, burning, rashes, or lesions   MUSCULOSKELETAL: No joint pain or swelling; No muscle, back, or extremity pain  PSYCHIATRIC: No depression, anxiety, mood swings, or difficulty sleeping      Medications:  vancomycin  IVPB. 1000 milliGRAM(s) IV Intermittent once            heparin   Injectable 7500 Unit(s) IV Push once  heparin   Injectable 7500 Unit(s) IV Push every 6 hours PRN  heparin   Injectable 3500 Unit(s) IV Push every 6 hours PRN  heparin  Infusion.  Unit(s)/Hr IV Continuous <Continuous>    pantoprazole  Injectable 40 milliGRAM(s) IV Push daily        sodium chloride 0.9% Bolus 1000 milliLiter(s) IV Bolus once      chlorhexidine 2% Cloths 1 Application(s) Topical <User Schedule>            ICU Vital Signs Last 24 Hrs  T(C): 38.8 (06 Oct 2022 23:44), Max: 38.8 (06 Oct 2022 23:44)  T(F): 101.8 (06 Oct 2022 23:44), Max: 101.8 (06 Oct 2022 23:44)  HR: 86 (06 Oct 2022 23:44) (81 - 86)  BP: 130/81 (06 Oct 2022 23:44) (120/80 - 130/81)  BP(mean): --  ABP: --  ABP(mean): --  RR: 16 (06 Oct 2022 23:44) (16 - 18)  SpO2: 94% (06 Oct 2022 23:44) (94% - 95%)    O2 Parameters below as of 06 Oct 2022 23:44  Patient On (Oxygen Delivery Method): room air          Vital Signs Last 24 Hrs  T(C): 38.8 (06 Oct 2022 23:44), Max: 38.8 (06 Oct 2022 23:44)  T(F): 101.8 (06 Oct 2022 23:44), Max: 101.8 (06 Oct 2022 23:44)  HR: 86 (06 Oct 2022 23:44) (81 - 86)  BP: 130/81 (06 Oct 2022 23:44) (120/80 - 130/81)  BP(mean): --  RR: 16 (06 Oct 2022 23:44) (16 - 18)  SpO2: 94% (06 Oct 2022 23:44) (94% - 95%)    Parameters below as of 06 Oct 2022 23:44  Patient On (Oxygen Delivery Method): room air            I&O's Detail        LABS:                        12.8   16.69 )-----------( 289      ( 06 Oct 2022 19:25 )             39.5     10-06    138  |  104  |  25<H>  ----------------------------<  143<H>  4.2   |  25  |  1.20    Ca    9.5      06 Oct 2022 19:25    TPro  8.3  /  Alb  3.4  /  TBili  0.9  /  DBili  x   /  AST  31  /  ALT  54  /  AlkPhos  81  10-06          CAPILLARY BLOOD GLUCOSE        PT/INR - ( 07 Oct 2022 01:50 )   PT: 17.4 sec;   INR: 1.48 ratio         PTT - ( 07 Oct 2022 01:50 )  PTT:39.0 sec    CULTURES:      Physical Examination:    General: Pt laying in hospital bed in no acute distress.  Alert, oriented, interactive.     HEENT: Pupils equal, reactive to light.  Symmetric.    PULM: Clear to auscultation bilaterally, no significant sputum production    CVS: Regular rate and rhythm, no murmurs, rubs, or gallops    ABD: Soft, nondistended, nontender, normoactive bowel sounds, no masses    EXT: No edema, nontender    SKIN: Warm and well perfused.     Neuro: A/O X4, moving all extremities well     RADIOLOGY:   < from: CT Angio Chest PE Protocol w/ IV Cont (10.07.22 @ 00:44) >  IMPRESSION:    Findings compatible with bilateral pulmonary emboli with suggestion of   right heart strain as detailed above.    Somewhat consolidative opacities in bilateral lower lobes, raising   concern for developing pneumonia. Pulmonary infarcts considered in the   differential. Pulmonary imaging follow-up in 6 weeks advised demonstrate   resolution.    Trace left pleural effusion.    Few bilateral sub-pleural based nodules, largest measuring up to 5mm in   left lower lobe. Pulmonary imaging follow-up in one year is advised if   the patient is increased risk for neoplasm.    Heterogeneously enlarged prostate, cause mass effect and protrusion at   the bladder base. Correlate with PSA and further nonemergentworkup to   exclude underlying prostatic malignancy.    Additional findings as mentioned above.    These critical results were discussed via telephone at 10/7/2022 1:03 AM   by Dr. Escudero of radiology with Dr. Lane, read-back was followed.    --- End of Report ---            SHASTA ESCUDERO MD; Attending Radiologist  This document has been electronically signed. Oct  7 2022  1:05AM       Patient is a 87y old  Male who presents with a chief complaint of abdominal pain.     BRIEF HOSPITAL COURSE: Pt is a 86 y/o M pmhx of BPH, HLD who presents to [facility] ED w/ complaints of abdominal pain, worse w/ inspiration. In ED pt was found to have elevated D-dimer, CTA + for B/L PE's w/ concern for RV strain, Troponin and Pro BNP negative. Called for potential transfer for higher level of care but no intervention required at this time. ICU consulted for further eval and management.     Interval Course: Patient was seen at bedside and is doing well. He no longer has any abdominal pain. He states that he only has a chest pain at the end of inspiration. He stated that he goes to a urologist every 6 months but is unsure why exactly he goes so frequently. His urologists name is Dr. Sebas Ron and he last saw him 4 months ago.    PAST MEDICAL & SURGICAL HISTORY:  HLD (hyperlipidemia)      Enlarged prostate        Allergies    No Known Allergies    Intolerances      FAMILY HISTORY:  Mother  - had cancer - patient unsure what type     Review of Systems:  CONSTITUTIONAL: No fever, chills, or fatigue  EYES: No eye pain, visual disturbances, or discharge  ENMT:  No difficulty hearing, tinnitus, vertigo; No sinus or throat pain  NECK: No pain or stiffness  RESPIRATORY: No cough, wheezing, chills or hemoptysis; No shortness of breath  CARDIOVASCULAR: chest pain at the end of deep inspiration  GASTROINTESTINAL: + abdominal No epigastric pain. No nausea, vomiting, or hematemesis; No diarrhea or constipation. No melena or hematochezia.  GENITOURINARY: No dysuria, frequency, hematuria, or incontinence  NEUROLOGICAL:  AAOX4. No headaches, memory loss, loss of strength, numbness, or tremors  SKIN: No itching, burning, rashes, or lesions   MUSCULOSKELETAL: No joint pain or swelling; No muscle, back, or extremity pain  PSYCHIATRIC: No depression, anxiety, mood swings, or difficulty sleeping      Medications:  vancomycin  IVPB. 1000 milliGRAM(s) IV Intermittent once            heparin   Injectable 7500 Unit(s) IV Push once  heparin   Injectable 7500 Unit(s) IV Push every 6 hours PRN  heparin   Injectable 3500 Unit(s) IV Push every 6 hours PRN  heparin  Infusion.  Unit(s)/Hr IV Continuous <Continuous>    pantoprazole  Injectable 40 milliGRAM(s) IV Push daily        sodium chloride 0.9% Bolus 1000 milliLiter(s) IV Bolus once      chlorhexidine 2% Cloths 1 Application(s) Topical <User Schedule>            ICU Vital Signs Last 24 Hrs  T(C): 38.8 (06 Oct 2022 23:44), Max: 38.8 (06 Oct 2022 23:44)  T(F): 101.8 (06 Oct 2022 23:44), Max: 101.8 (06 Oct 2022 23:44)  HR: 86 (06 Oct 2022 23:44) (81 - 86)  BP: 130/81 (06 Oct 2022 23:44) (120/80 - 130/81)  BP(mean): --  ABP: --  ABP(mean): --  RR: 16 (06 Oct 2022 23:44) (16 - 18)  SpO2: 94% (06 Oct 2022 23:44) (94% - 95%)    O2 Parameters below as of 06 Oct 2022 23:44  Patient On (Oxygen Delivery Method): room air          Vital Signs Last 24 Hrs  T(C): 38.8 (06 Oct 2022 23:44), Max: 38.8 (06 Oct 2022 23:44)  T(F): 101.8 (06 Oct 2022 23:44), Max: 101.8 (06 Oct 2022 23:44)  HR: 86 (06 Oct 2022 23:44) (81 - 86)  BP: 130/81 (06 Oct 2022 23:44) (120/80 - 130/81)  BP(mean): --  RR: 16 (06 Oct 2022 23:44) (16 - 18)  SpO2: 94% (06 Oct 2022 23:44) (94% - 95%)    Parameters below as of 06 Oct 2022 23:44  Patient On (Oxygen Delivery Method): room air            I&O's Detail        LABS:                        12.8   16.69 )-----------( 289      ( 06 Oct 2022 19:25 )             39.5     10-06    138  |  104  |  25<H>  ----------------------------<  143<H>  4.2   |  25  |  1.20    Ca    9.5      06 Oct 2022 19:25    TPro  8.3  /  Alb  3.4  /  TBili  0.9  /  DBili  x   /  AST  31  /  ALT  54  /  AlkPhos  81  10-06          CAPILLARY BLOOD GLUCOSE        PT/INR - ( 07 Oct 2022 01:50 )   PT: 17.4 sec;   INR: 1.48 ratio         PTT - ( 07 Oct 2022 01:50 )  PTT:39.0 sec    CULTURES:      Physical Examination:    General: Pt laying in hospital bed in no acute distress.  Alert, oriented, interactive.     HEENT: Pupils equal, reactive to light.  Symmetric.    PULM: Clear to auscultation bilaterally, no significant sputum production    CVS: Regular rate and rhythm, no murmurs, rubs, or gallops    ABD: Soft, nondistended, nontender, normoactive bowel sounds, no masses    EXT: No edema, nontender    SKIN: Warm and well perfused.     Neuro: A/O X4, moving all extremities well     RADIOLOGY:   < from: CT Angio Chest PE Protocol w/ IV Cont (10.07.22 @ 00:44) >  IMPRESSION:    Findings compatible with bilateral pulmonary emboli with suggestion of   right heart strain as detailed above.    Somewhat consolidative opacities in bilateral lower lobes, raising   concern for developing pneumonia. Pulmonary infarcts considered in the   differential. Pulmonary imaging follow-up in 6 weeks advised demonstrate   resolution.    Trace left pleural effusion.    Few bilateral sub-pleural based nodules, largest measuring up to 5mm in   left lower lobe. Pulmonary imaging follow-up in one year is advised if   the patient is increased risk for neoplasm.    Heterogeneously enlarged prostate, cause mass effect and protrusion at   the bladder base. Correlate with PSA and further nonemergentworkup to   exclude underlying prostatic malignancy.    Additional findings as mentioned above.    These critical results were discussed via telephone at 10/7/2022 1:03 AM   by Dr. Escudero of radiology with Dr. Lane, read-back was followed.    --- End of Report ---            SHASTA ESCUDERO MD; Attending Radiologist  This document has been electronically signed. Oct  7 2022  1:05AM

## 2022-10-07 NOTE — DIETITIAN INITIAL EVALUATION ADULT - PERTINENT MEDS FT
MEDICATIONS  (STANDING):  chlorhexidine 2% Cloths 1 Application(s) Topical <User Schedule>  heparin  Infusion.  Unit(s)/Hr (17 mL/Hr) IV Continuous <Continuous>  influenza  Vaccine (HIGH DOSE) 0.7 milliLiter(s) IntraMuscular once  pantoprazole  Injectable 40 milliGRAM(s) IV Push daily    MEDICATIONS  (PRN):  heparin   Injectable 7500 Unit(s) IV Push every 6 hours PRN For aPTT less than 40  heparin   Injectable 3500 Unit(s) IV Push every 6 hours PRN For aPTT between 40 - 57

## 2022-10-07 NOTE — DISCHARGE NOTE NURSING/CASE MANAGEMENT/SOCIAL WORK - NSDCPEFALRISK_GEN_ALL_CORE
For information on Fall & Injury Prevention, visit: [email or web address] OR  [email or web address] OR  https://www.cdc.gov/steadi/patient.html

## 2022-10-07 NOTE — DIETITIAN INITIAL EVALUATION ADULT - PERTINENT LABORATORY DATA
10-06    138  |  104  |  25<H>  ----------------------------<  143<H>  4.2   |  25  |  1.20    Ca    9.5      06 Oct 2022 19:25    TPro  8.3  /  Alb  3.4  /  TBili  0.9  /  DBili  x   /  AST  31  /  ALT  54  /  AlkPhos  81  10-06

## 2022-10-07 NOTE — H&P ADULT - HISTORY OF PRESENT ILLNESS
86 yo M with PMH dementia presents to the ED with SOB and abdominak pain. As per wife, pt has been confused with a decrease appetite for the past few days. Pt saw his Pulmonologist Dr. Rogel because pt had a PNA infection last year that was treated with antibiotics which were completed on ___.  Pt has been more short of breath along with abdominal pain, pulmonologist was concerned about an ulcer and therefore prescribed her tessalon perles along with protonix. Pt also --- which is worse with movement.   Denies fever, chills, chest pain, palpitations, cough,, nausea, vomiting, diarrhea,  Denies recent travel  or sick contacts.    ED Course:   Vitals: BP: 128/61, HR: 83-->141-->132, Temp: 99.2 F, RR: 18-->35, SpO2: 94% on RA   Labs: wbc: 16.69, H/H: 12.8/39.5, PT/INR: 17.4/1.48, aPTT: 39, Ddimer: 3527, proBNP: 660  trop: 32.4-->89.2    RVP negative    CXR: as per personal read, official read pending   CT Head: No acute intracranial hemorrhage, territorial infarct or mass effect.  CT Angio Chest and CT A/P: Findings compatible with bilateral pulmonary emboli with suggestion of right heart strain as detailed above. Somewhat consolidative opacities in bilateral lower lobes, raising concern for developing pneumonia. Pulmonary infarcts considered in the differential. Trace left pleural effusion. Few bilateral sub-pleural based nodules, largest measuring up to 5mm in left lower lobe. Heterogeneously enlarged prostate, cause mass effect and protrusion at the bladder base. Correlate with PSA and further nonemergent workup to exclude underlying prostatic malignancy.  EKG: sinus tachycardia, HR: 101 --> Pt went to rapid afib in the ICU   Received Ofirmev x1, Heparin 7500 mg, Lopressor 5 mg IVP x1, Protonix 40 mg IVP x1, Zosyn x1 Vanco x1, 1L NS bolus x1 and started on Heparin drip in the ED    86 yo M with PMH HLD, BPH and dementia presents to the ED with abdominal pain. Obtained collateral history from wife Lima.  As per wife, pt had been experiencing upper abdominal pain radiating to the shoulder since the middle of September, he was seen in UC and his PCP and Pulmonologist who diagnosed him with PNA. Pt was treated for the PNA with course of Cefdinir (completed 10 day course) and course of Doxycycline (had two pills left). Symptoms have been worsening since Monday. As per wife, pt has been increasingly confused with a decrease appetite for the past few days. Pt was also complaining about abdominal pain along with back pain and SOB. Pt saw pulmonologist today who was concerned about an ulcer and therefore prescribed her tessalon perles along with protonix. When pt was examined, he was AAOx2, as per wife baseline mental status is AAOx2 but is very forgetful. Reports improvement in his abdominal pain. Denies fever, chills, palpitations, cough,, nausea, vomiting,   Denies recent travel, long car rides, plane rides, family history of clots, or sick contacts.    ED Course:   Vitals: BP: 128/61, HR: 83-->141-->132, Temp: 99.2 F, RR: 18-->35, SpO2: 94% on RA   Labs: wbc: 16.69, H/H: 12.8/39.5, PT/INR: 17.4/1.48, aPTT: 39, Ddimer: 3527, proBNP: 660  trop: 32.4-->89.2    RVP negative    CT Head: No acute intracranial hemorrhage, territorial infarct or mass effect.  CT Angio Chest and CT A/P: Findings compatible with bilateral pulmonary emboli with suggestion of right heart strain as detailed above. Somewhat consolidative opacities in bilateral lower lobes, raising concern for developing pneumonia. Pulmonary infarcts considered in the differential. Trace left pleural effusion. Few bilateral sub-pleural based nodules, largest measuring up to 5mm in left lower lobe. Heterogeneously enlarged prostate, cause mass effect and protrusion at the bladder base. Correlate with PSA and further nonemergent workup to exclude underlying prostatic malignancy.  EKG: sinus tachycardia, HR: 101 --> Pt went to rapid afib in the ICU   Received Ofirmev x1, Heparin 7500 mg, Lopressor 5 mg IVP x1, Protonix 40 mg IVP x1, Zosyn x1 Vanco x1, 1L NS bolus x1 and started on Heparin drip in the ED    88 yo M with PMH HLD, BPH and dementia presents to the ED with abdominal pain. Obtained collateral history from wife Lima.  As per wife, pt had been experiencing upper abdominal pain radiating to the shoulder since the middle of September, he was seen in UC and his PCP and Pulmonologist who diagnosed him with PNA. Pt was treated for the PNA with course of Cefdinir (completed 10 day course) and course of Doxycycline (had two pills left). Symptoms have been worsening since Monday. As per wife, pt has been increasingly confused with a decrease appetite for the past few days. Pt was also complaining about abdominal pain along with back pain and SOB. Pt saw pulmonologist today who was concerned about an ulcer and therefore prescribed her tessalon perles along with protonix. When pt was examined, he was AAOx2, as per wife baseline mental status is AAOx2 but is very forgetful. Reports improvement in his abdominal pain. Denies fever, chills, palpitations, cough,, nausea, vomiting,   Denies recent travel, long car rides, plane rides, family history of clots, or sick contacts.    ED Course:   Vitals: BP: 128/61, HR: 83-->141-->132, Temp: 99.2 F, RR: 18-->35, SpO2: 94% on RA   Labs: wbc: 16.69, H/H: 12.8/39.5, PT/INR: 17.4/1.48, aPTT: 39, Ddimer: 3527, proBNP: 158-->660  trop: 32.4-->89.2    RVP negative    CT Head: No acute intracranial hemorrhage, territorial infarct or mass effect.  CT Angio Chest and CT A/P: Findings compatible with bilateral pulmonary emboli with suggestion of right heart strain as detailed above. Somewhat consolidative opacities in bilateral lower lobes, raising concern for developing pneumonia. Pulmonary infarcts considered in the differential. Trace left pleural effusion. Few bilateral sub-pleural based nodules, largest measuring up to 5mm in left lower lobe. Heterogeneously enlarged prostate, cause mass effect and protrusion at the bladder base. Correlate with PSA and further nonemergent workup to exclude underlying prostatic malignancy.  EKG: sinus tachycardia, HR: 101 --> Pt went to rapid afib in the ICU   Received Ofirmev x1, Heparin 7500 mg, Lopressor 5 mg IVP x1, Protonix 40 mg IVP x1, Zosyn x1 Vanco x1, 1L NS bolus x1 and started on Heparin drip in the ED    86 yo M with PMH HLD, BPH and dementia presents to the ED with abdominal pain. Obtained collateral history from wife Lima.  As per wife, pt had been experiencing upper abdominal pain radiating to the shoulder since the middle of September, he was seen in UC and his PCP and Pulmonologist who diagnosed him with PNA. Pt was treated for the PNA with course of Cefdinir (completed 10 day course) and course of Doxycycline (had two pills left). Symptoms have been worsening since Monday. As per wife, pt has been increasingly confused with a decrease appetite for the past few days. Pt was also complaining about abdominal pain along with back pain and SOB. Pt saw pulmonologist today who was concerned about an ulcer and therefore prescribed her tessalon perles along with protonix. When pt was examined, he was AAOx2, as per wife baseline mental status is AAOx2 but is very forgetful. Reports improvement in his abdominal pain. Denies fever, chills, palpitations, cough,, nausea, vomiting,   Denies recent travel, long car rides, plane rides, family history of clots, or sick contacts.  ED Course:   Vitals: BP: 128/61, HR: 83-->141-->132, Temp: 99.2 F, RR: 18-->35, SpO2: 94% on RA   Labs: wbc: 16.69, H/H: 12.8/39.5, PT/INR: 17.4/1.48, aPTT: 39, Ddimer: 3527, proBNP: 158-->660  trop: 32.4-->89.2    RVP negative    CT Head: No acute intracranial hemorrhage, territorial infarct or mass effect.  CT Angio Chest and CT A/P: Findings compatible with bilateral pulmonary emboli with suggestion of right heart strain as detailed above. Somewhat consolidative opacities in bilateral lower lobes, raising concern for developing pneumonia. Pulmonary infarcts considered in the differential. Trace left pleural effusion. Few bilateral sub-pleural based nodules, largest measuring up to 5mm in left lower lobe. Heterogeneously enlarged prostate, cause mass effect and protrusion at the bladder base. Correlate with PSA and further nonemergent workup to exclude underlying prostatic malignancy.  EKG: sinus tachycardia, HR: 101 --> Pt went to rapid afib in the ICU   Received Ofirmev x1, Heparin 7500 mg, Lopressor 5 mg IVP x1, Protonix 40 mg IVP x1, Zosyn x1 Vanco x1, 1L NS bolus x1 and started on Heparin drip in the ED

## 2022-10-07 NOTE — DIETITIAN INITIAL EVALUATION ADULT - NS FNS DIET ORDER
Diet, NPO:   Except Medications     Special Instructions for Nursing:  Except Medications (10-07-22 @ 01:57)

## 2022-10-07 NOTE — DISCHARGE NOTE NURSING/CASE MANAGEMENT/SOCIAL WORK - NSTRANSFERBELONGINGSRESP_GEN_A_NUR
yes Benzoyl Peroxide Pregnancy And Lactation Text: This medication is Pregnancy Category C. It is unknown if benzoyl peroxide is excreted in breast milk.

## 2022-10-07 NOTE — CONSULT NOTE ADULT - ASSESSMENT
Pt is a 88 y/o M pmhx of BPH, HLD who presents to hospitals ED w/ complaints of abdominal pain, worse w/ inspiration. In ED pt was found to have elevated D-dimer, CTA + for B/L PE's w/ concern for RV strain, Troponin and Pro BNP negative. Called for potential transfer for higher level of care but no intervention required at this time.    1. Pulmonary embolism   2. Leukocytosis     Plan:     Neuro: Awake and alert at baseline, avoid sedating medications     CV: RV strain on CT scan, official TTE in AM to eval for RV strain, will trend Pro BNP and troponin for signs of additional strain.     Pulm: B/L Pulmonary embolisms seen on CT scan w/ potential RV strain, will start on heparin gtt for full dose AC, supplement O2 as needed for SpO2>90%.     GI: Diet: NPO Protonix for GI PPX     Renal: No active issues, continue to monitor and avoid nephrotoxic meds.     Endo: Glucose <180, Mag>2, K>4 for arrythmia suppression     Heme: Full dose AC w/ heparin gtt, monitor via ptt, and titrate gtt accordingly. B/L lower extremity dopplers to evaluate for additional DVT's.     ID: Leukocytosis, on routine labs, continue to trend markers of infection and will start on abx if indicated.     Case discussed w/ EICU attending

## 2022-10-07 NOTE — CONSULT NOTE ADULT - CONVERSATION DETAILS
Discussed w/ pt wife Lima and pt prognosis and treatment options. Pt and wife agreeable to treatment plan involving heparin gtt. Discussed code status and need for CPR and intubation if required. Pt wife Lima states pt would not want any heroic measures and would not want anything that would limit his quality of life. Pt wife decided to make pt DNR/DNI.

## 2022-10-07 NOTE — PHARMACOTHERAPY INTERVENTION NOTE - COMMENTS
Pt admitted for PE. MD transitioning patient from heparin to Eliquis. Discussed with MD to set up m2b for Eliquis and to re-start home meds: Rivastigmine, Finasteride and Simvastatin. MD accepted and orders were updated.

## 2022-10-07 NOTE — H&P ADULT - ASSESSMENT
88 yo M with PMH HLD, BPH and dementia presents to the ED with abdominal pain. Admitted for bilateral PE.     ED Course:   Vitals: BP: 128/61, HR: 83-->141-->132, Temp: 99.2 F, RR: 18-->35, SpO2: 94% on RA   Labs: wbc: 16.69, H/H: 12.8/39.5, PT/INR: 17.4/1.48, aPTT: 39, Ddimer: 3527, proBNP: 660  trop: 32.4-->89.2    RVP negative    CXR: as per personal read, official read pending   CT Head: No acute intracranial hemorrhage, territorial infarct or mass effect.  CT Angio Chest and CT A/P: Findings compatible with bilateral pulmonary emboli with suggestion of right heart strain as detailed above. Somewhat consolidative opacities in bilateral lower lobes, raising concern for developing pneumonia. Pulmonary infarcts considered in the differential. Trace left pleural effusion. Few bilateral sub-pleural based nodules, largest measuring up to 5mm in left lower lobe. Heterogeneously enlarged prostate, cause mass effect and protrusion at the bladder base. Correlate with PSA and further nonemergent workup to exclude underlying prostatic malignancy.  EKG: sinus tachycardia, HR: 101 --> Pt went to rapid afib in the ICU   Received Ofirmev x1, Heparin 7500 mg, Lopressor 5 mg IVP x1, Protonix 40 mg IVP x1, Zosyn x1 Vanco x1, 1L NS bolus x1 and started on Heparin drip in the ED      86 yo M with PMH HLD, BPH and dementia presents to the ED with abdominal pain. Admitted for bilateral PE.

## 2022-10-07 NOTE — DIETITIAN INITIAL EVALUATION ADULT - PROBLEM/PLAN-1
----- Message from Bhumika Gilliam sent at 1/3/2022  1:22 PM CST -----  Contact: [phone]  Type:  Patient Returning Call    Who Called:pt called  Who Left Message for Patient:Georgiana  Does the patient know what this is regarding?: scheduling f/u scans  Would the patient rather a call back or a response via [facility]? Call back  Best Call Back Number:[phone]  Additional Information:        DISPLAY PLAN FREE TEXT

## 2022-10-07 NOTE — DIETITIAN INITIAL EVALUATION ADULT - ADD RECOMMEND
Monitor electrolytes if pt is to remain NPO, replete PRN.  Monitor electrolytes if pt is to remain NPO, replete PRN.   Advance patient to regular diet when medically feasible  Add Ensure Vanilla PO BID

## 2022-10-07 NOTE — PATIENT PROFILE ADULT - FALL HARM RISK - HARM RISK INTERVENTIONS

## 2022-10-07 NOTE — H&P ADULT - PROBLEM SELECTOR PLAN 1
Pt presents with abdominal pain, back pain and SOB  - Ddimer: 3527, proBNP: 660, trop: 32.4-->89.2 on admission   - CT Angio Chest and CT A/P: Findings compatible with bilateral pulmonary emboli with suggestion of right heart strain.   - s/p Ofirmev x1, Heparin 7500 mg, Protonix 40 mg IVP x1 and started on Heparin drip in the ED  - Obtain TTE to evaluate for RV strain that was seen on CT scan   - F/u BLE Doppler to evaluate for DVT  - Continue Heparin drip   - Trend proBNP and troponin    - Monitor oxygen saturation to maintain SpO2>90%  - Plan per ICU Pt presents with abdominal pain, back pain and SOB  - Ddimer: 3527, proBNP: 158-->660, trop: 32.4-->89.2 on admission   - CT Angio Chest and CT A/P: Findings compatible with bilateral pulmonary emboli with suggestion of right heart strain.   - initial EKG: sinus tachycardia, HR: 101   - s/p Ofirmev x1, Heparin 7500 mg, Protonix 40 mg IVP x1 and started on Heparin drip in the ED  - Obtain TTE to evaluate for RV strain that was seen on CT scan   - F/u BLE Doppler to evaluate for DVT  - Continue Heparin drip   - Trend proBNP and troponin    - Monitor oxygen saturation to maintain SpO2>90%  - Plan per ICU Pt presents with abdominal pain, back pain and SOB  - Ddimer: 3527, proBNP: 158-->660, trop: 32.4-->89.2 on admission   - CT Angio Chest and CT A/P: Findings compatible with bilateral pulmonary emboli with suggestion of right heart strain.   - initial EKG: sinus tachycardia, HR: 101, troponin positive and bnp elevated   - s/p Ofirmev x1, Heparin 7500 mg, Protonix 40 mg IVP x1 and started on Heparin drip in the ED  - Obtain TTE to further evaluate for RV strain that was seen on CT scan   - F/u BLE Doppler to evaluate for DVT  - Continue Heparin drip   - Trend proBNP and troponin    - Monitor oxygen saturation to maintain SpO2>90%  - Plan per ICU

## 2022-10-07 NOTE — H&P ADULT - NSHPREVIEWOFSYSTEMS_GEN_ALL_CORE
CONSTITUTIONAL: denies fever, chills, fatigue, weakness  HEENT: denies blurred vision, sore throat  SKIN: denies new lesions, rash  CARDIOVASCULAR: denies chest pain, chest pressure, palpitations  RESPIRATORY: denies shortness of breath, cough, sputum production  GASTROINTESTINAL: denies nausea, vomiting, diarrhea, abdominal pain, melena or hematochezia  GENITOURINARY: denies dysuria, discharge  NEUROLOGICAL: denies numbness, headache, focal weakness  MUSCULOSKELETAL: denies new joint pain, muscle aches  HEMATOLOGIC: denies gross bleeding, bruising Limited 2/2 dementia

## 2022-10-07 NOTE — ED ADULT NURSE REASSESSMENT NOTE - COMFORT CARE
plan of care explained/repositioned/side rails up/side rails down/warm blanket provided
plan of care explained

## 2022-10-07 NOTE — CONSULT NOTE ADULT - SUBJECTIVE AND OBJECTIVE BOX
Patient is a 87y old  Male who presents with a chief complaint of abdominal pain.     BRIEF HOSPITAL COURSE: Pt is a 88 y/o M pmhx of BPH, HLD who presents to [facility] ED w/ complaints of abdominal pain, worse w/ inspiration. In ED pt was found to have elevated D-dimer, CTA + for B/L PE's w/ concern for RV strain, Troponin and Pro BNP negative. Called for potential transfer for higher level of care but no intervention required at this time. ICU consulted for further eval and management.     PAST MEDICAL & SURGICAL HISTORY:  HLD (hyperlipidemia)      Enlarged prostate        Allergies    No Known Allergies    Intolerances      FAMILY HISTORY:      Review of Systems:  CONSTITUTIONAL: No fever, chills, or fatigue  EYES: No eye pain, visual disturbances, or discharge  ENMT:  No difficulty hearing, tinnitus, vertigo; No sinus or throat pain  NECK: No pain or stiffness  RESPIRATORY: No cough, wheezing, chills or hemoptysis; No shortness of breath  CARDIOVASCULAR: No chest pain, palpitations, dizziness, or leg swelling  GASTROINTESTINAL: + abdominal No epigastric pain. No nausea, vomiting, or hematemesis; No diarrhea or constipation. No melena or hematochezia.  GENITOURINARY: No dysuria, frequency, hematuria, or incontinence  NEUROLOGICAL: No headaches, memory loss, loss of strength, numbness, or tremors  SKIN: No itching, burning, rashes, or lesions   MUSCULOSKELETAL: No joint pain or swelling; No muscle, back, or extremity pain  PSYCHIATRIC: No depression, anxiety, mood swings, or difficulty sleeping      Medications:  vancomycin  IVPB. 1000 milliGRAM(s) IV Intermittent once            heparin   Injectable 7500 Unit(s) IV Push once  heparin   Injectable 7500 Unit(s) IV Push every 6 hours PRN  heparin   Injectable 3500 Unit(s) IV Push every 6 hours PRN  heparin  Infusion.  Unit(s)/Hr IV Continuous <Continuous>    pantoprazole  Injectable 40 milliGRAM(s) IV Push daily        sodium chloride 0.9% Bolus 1000 milliLiter(s) IV Bolus once      chlorhexidine 2% Cloths 1 Application(s) Topical <User Schedule>            ICU Vital Signs Last 24 Hrs  T(C): 38.8 (06 Oct 2022 23:44), Max: 38.8 (06 Oct 2022 23:44)  T(F): 101.8 (06 Oct 2022 23:44), Max: 101.8 (06 Oct 2022 23:44)  HR: 86 (06 Oct 2022 23:44) (81 - 86)  BP: 130/81 (06 Oct 2022 23:44) (120/80 - 130/81)  BP(mean): --  ABP: --  ABP(mean): --  RR: 16 (06 Oct 2022 23:44) (16 - 18)  SpO2: 94% (06 Oct 2022 23:44) (94% - 95%)    O2 Parameters below as of 06 Oct 2022 23:44  Patient On (Oxygen Delivery Method): room air          Vital Signs Last 24 Hrs  T(C): 38.8 (06 Oct 2022 23:44), Max: 38.8 (06 Oct 2022 23:44)  T(F): 101.8 (06 Oct 2022 23:44), Max: 101.8 (06 Oct 2022 23:44)  HR: 86 (06 Oct 2022 23:44) (81 - 86)  BP: 130/81 (06 Oct 2022 23:44) (120/80 - 130/81)  BP(mean): --  RR: 16 (06 Oct 2022 23:44) (16 - 18)  SpO2: 94% (06 Oct 2022 23:44) (94% - 95%)    Parameters below as of 06 Oct 2022 23:44  Patient On (Oxygen Delivery Method): room air            I&O's Detail        LABS:                        12.8   16.69 )-----------( 289      ( 06 Oct 2022 19:25 )             39.5     10-06    138  |  104  |  25<H>  ----------------------------<  143<H>  4.2   |  25  |  1.20    Ca    9.5      06 Oct 2022 19:25    TPro  8.3  /  Alb  3.4  /  TBili  0.9  /  DBili  x   /  AST  31  /  ALT  54  /  AlkPhos  81  10-06          CAPILLARY BLOOD GLUCOSE        PT/INR - ( 07 Oct 2022 01:50 )   PT: 17.4 sec;   INR: 1.48 ratio         PTT - ( 07 Oct 2022 01:50 )  PTT:39.0 sec    CULTURES:      Physical Examination:    General: Pt laying in hospital bed in no acute distress.  Alert, oriented, interactive.     HEENT: Pupils equal, reactive to light.  Symmetric.    PULM: Clear to auscultation bilaterally, no significant sputum production    CVS: Regular rate and rhythm, no murmurs, rubs, or gallops    ABD: Soft, nondistended, nontender, normoactive bowel sounds, no masses    EXT: No edema, nontender    SKIN: Warm and well perfused.     Neuro: A/O X4, moving all extremities well     RADIOLOGY:   < from: CT Angio Chest PE Protocol w/ IV Cont (10.07.22 @ 00:44) >  IMPRESSION:    Findings compatible with bilateral pulmonary emboli with suggestion of   right heart strain as detailed above.    Somewhat consolidative opacities in bilateral lower lobes, raising   concern for developing pneumonia. Pulmonary infarcts considered in the   differential. Pulmonary imaging follow-up in 6 weeks advised demonstrate   resolution.    Trace left pleural effusion.    Few bilateral sub-pleural based nodules, largest measuring up to 5mm in   left lower lobe. Pulmonary imaging follow-up in one year is advised if   the patient is increased risk for neoplasm.    Heterogeneously enlarged prostate, cause mass effect and protrusion at   the bladder base. Correlate with PSA and further nonemergentworkup to   exclude underlying prostatic malignancy.    Additional findings as mentioned above.    These critical results were discussed via telephone at 10/7/2022 1:03 AM   by Dr. Escudero of radiology with Dr. Lane, read-back was followed.    --- End of Report ---            SHASTA ESCUDERO MD; Attending Radiologist  This document has been electronically signed. Oct  7 2022  1:05AM

## 2022-10-07 NOTE — H&P ADULT - NSHPSOCIALHISTORY_GEN_ALL_CORE
Tobacco:   EtOH:   Recreational drug use:  Lives with:  Ambulates:  ADLs:  Occupation:  Vaccinations: Tobacco: Denies  EtOH: Denies   Recreational drug use: Denies   Lives with: wife  Ambulates: with assistance

## 2022-10-07 NOTE — H&P ADULT - ATTENDING COMMENTS
86 yo M with PMH HLD, BPH and dementia presents to the ED with abdominal pain. Admitted for bilateral PE with likely heart strain.     Agree with H&P as above. Edited personally where appropriate directly into the body of the note.       ED and ICU attempted to transfer patient for higher level of care, not accepted for transfer at this time. See ED and ICU notes for further details on discussions.

## 2022-10-08 ENCOUNTER — TRANSCRIPTION ENCOUNTER (OUTPATIENT)
Age: 87
End: 2022-10-08

## 2022-10-08 LAB
ANION GAP SERPL CALC-SCNC: 6 MMOL/L — SIGNIFICANT CHANGE UP (ref 5–17)
BUN SERPL-MCNC: 20 MG/DL — SIGNIFICANT CHANGE UP (ref 7–23)
CALCIUM SERPL-MCNC: 9 MG/DL — SIGNIFICANT CHANGE UP (ref 8.5–10.1)
CHLORIDE SERPL-SCNC: 109 MMOL/L — HIGH (ref 96–108)
CO2 SERPL-SCNC: 27 MMOL/L — SIGNIFICANT CHANGE UP (ref 22–31)
CREAT SERPL-MCNC: 0.85 MG/DL — SIGNIFICANT CHANGE UP (ref 0.5–1.3)
EGFR: 84 ML/MIN/1.73M2 — SIGNIFICANT CHANGE UP
GLUCOSE SERPL-MCNC: 120 MG/DL — HIGH (ref 70–99)
HCT VFR BLD CALC: 34.1 % — LOW (ref 39–50)
HGB BLD-MCNC: 11.2 G/DL — LOW (ref 13–17)
MAGNESIUM SERPL-MCNC: 2.2 MG/DL — SIGNIFICANT CHANGE UP (ref 1.6–2.6)
MCHC RBC-ENTMCNC: 29.9 PG — SIGNIFICANT CHANGE UP (ref 27–34)
MCHC RBC-ENTMCNC: 32.8 GM/DL — SIGNIFICANT CHANGE UP (ref 32–36)
MCV RBC AUTO: 91.2 FL — SIGNIFICANT CHANGE UP (ref 80–100)
NRBC # BLD: 0 /100 WBCS — SIGNIFICANT CHANGE UP (ref 0–0)
NT-PROBNP SERPL-SCNC: 992 PG/ML — HIGH (ref 0–450)
PHOSPHATE SERPL-MCNC: 2.9 MG/DL — SIGNIFICANT CHANGE UP (ref 2.5–4.5)
PLATELET # BLD AUTO: 226 K/UL — SIGNIFICANT CHANGE UP (ref 150–400)
POTASSIUM SERPL-MCNC: 3.9 MMOL/L — SIGNIFICANT CHANGE UP (ref 3.5–5.3)
POTASSIUM SERPL-SCNC: 3.9 MMOL/L — SIGNIFICANT CHANGE UP (ref 3.5–5.3)
RBC # BLD: 3.74 M/UL — LOW (ref 4.2–5.8)
RBC # FLD: 13.1 % — SIGNIFICANT CHANGE UP (ref 10.3–14.5)
SODIUM SERPL-SCNC: 142 MMOL/L — SIGNIFICANT CHANGE UP (ref 135–145)
TROPONIN I, HIGH SENSITIVITY RESULT: 33 NG/L — SIGNIFICANT CHANGE UP
WBC # BLD: 11.91 K/UL — HIGH (ref 3.8–10.5)
WBC # FLD AUTO: 11.91 K/UL — HIGH (ref 3.8–10.5)

## 2022-10-08 PROCEDURE — 93010 ELECTROCARDIOGRAM REPORT: CPT

## 2022-10-08 PROCEDURE — 99233 SBSQ HOSP IP/OBS HIGH 50: CPT

## 2022-10-08 PROCEDURE — 99233 SBSQ HOSP IP/OBS HIGH 50: CPT | Mod: GC

## 2022-10-08 RX ORDER — METOPROLOL TARTRATE 50 MG
5 TABLET ORAL ONCE
Refills: 0 | Status: COMPLETED | OUTPATIENT
Start: 2022-10-08 | End: 2022-10-08

## 2022-10-08 RX ORDER — METOPROLOL TARTRATE 50 MG
25 TABLET ORAL
Refills: 0 | Status: DISCONTINUED | OUTPATIENT
Start: 2022-10-08 | End: 2022-10-12

## 2022-10-08 RX ORDER — LIDOCAINE 4 G/100G
1 CREAM TOPICAL EVERY 24 HOURS
Refills: 0 | Status: DISCONTINUED | OUTPATIENT
Start: 2022-10-08 | End: 2022-10-12

## 2022-10-08 RX ORDER — KETOROLAC TROMETHAMINE 30 MG/ML
30 SYRINGE (ML) INJECTION ONCE
Refills: 0 | Status: DISCONTINUED | OUTPATIENT
Start: 2022-10-08 | End: 2022-10-08

## 2022-10-08 RX ADMIN — SIMVASTATIN 20 MILLIGRAM(S): 20 TABLET, FILM COATED ORAL at 21:27

## 2022-10-08 RX ADMIN — LIDOCAINE 1 PATCH: 4 CREAM TOPICAL at 12:43

## 2022-10-08 RX ADMIN — RIVASTIGMINE 1 PATCH: 4.6 PATCH, EXTENDED RELEASE TRANSDERMAL at 12:43

## 2022-10-08 RX ADMIN — RIVASTIGMINE 1 PATCH: 4.6 PATCH, EXTENDED RELEASE TRANSDERMAL at 13:30

## 2022-10-08 RX ADMIN — RIVASTIGMINE 1 PATCH: 4.6 PATCH, EXTENDED RELEASE TRANSDERMAL at 20:30

## 2022-10-08 RX ADMIN — RIVASTIGMINE 1 PATCH: 4.6 PATCH, EXTENDED RELEASE TRANSDERMAL at 07:16

## 2022-10-08 RX ADMIN — APIXABAN 10 MILLIGRAM(S): 2.5 TABLET, FILM COATED ORAL at 21:26

## 2022-10-08 RX ADMIN — LIDOCAINE 1 PATCH: 4 CREAM TOPICAL at 20:30

## 2022-10-08 RX ADMIN — Medication 5 MILLIGRAM(S): at 10:21

## 2022-10-08 RX ADMIN — Medication 25 MILLIGRAM(S): at 09:57

## 2022-10-08 RX ADMIN — Medication 30 MILLIGRAM(S): at 08:46

## 2022-10-08 RX ADMIN — FINASTERIDE 5 MILLIGRAM(S): 5 TABLET, FILM COATED ORAL at 12:44

## 2022-10-08 RX ADMIN — Medication 30 MILLIGRAM(S): at 07:54

## 2022-10-08 RX ADMIN — APIXABAN 10 MILLIGRAM(S): 2.5 TABLET, FILM COATED ORAL at 09:57

## 2022-10-08 NOTE — PROGRESS NOTE ADULT - ASSESSMENT
88 yo M with PMH HLD, BPH and dementia presents to the ED with abdominal pain. Admitted for bilateral PE with right heart strain.

## 2022-10-08 NOTE — DISCHARGE NOTE PROVIDER - ATTENDING DISCHARGE PHYSICAL EXAMINATION:
Gen: awake and conversant  CV: irregular, rate controlled  Pulm: clear lungs b/l  Abd: soft, ntnd  Ext: no edema/swelling  Skin: no rash/petechiae

## 2022-10-08 NOTE — PROGRESS NOTE ADULT - PROBLEM SELECTOR PLAN 2
AFIB with RVR- rate control with BB  Continue anticoagulation   Continue Lopressor for rate control- adjust dose of Lopressor as appropriate for optimal HR control  Monitor BP

## 2022-10-08 NOTE — DISCHARGE NOTE PROVIDER - NSDCCPCAREPLAN_GEN_ALL_CORE_FT
PRINCIPAL DISCHARGE DIAGNOSIS  Diagnosis: Pulmonary embolism  Assessment and Plan of Treatment: Patient was found ot have PE on CT Scan. Started on Heparin gtt and transitioned to Eliquis. Please continue Eliquis at home and follow up with primary care physician.      SECONDARY DISCHARGE DIAGNOSES  Diagnosis: Dementia  Assessment and Plan of Treatment: Please continue use of Rivastigimine at home and follow up with PCP.    Diagnosis: BPH (benign prostatic hyperplasia)  Assessment and Plan of Treatment: Continue use of Finasteride at home.    Diagnosis: Rapid atrial fibrillation  Assessment and Plan of Treatment: You were found to have AFib during hospital stay. Started on Metoprolol 5mg. Please continue use of Metoprolol at home.    Diagnosis: HLD (hyperlipidemia)  Assessment and Plan of Treatment: Continue use of Simvastatin.

## 2022-10-08 NOTE — DISCHARGE NOTE PROVIDER - CARE PROVIDER_API CALL
Wili Rogel  Pulmonary Diseases  [address] [facility], [address]  [place] [address]  Phone: [phone]  Fax: [phone]  Follow Up Time:     Maria C   Phone: (   )    -  Fax: (   )    -  Follow Up Time:    Wili Rogel  Pulmonary Diseases  [address] [facility] [address]  [place] [address]  Phone: [phone]  Fax: [phone]  Follow Up Time:     Maria C,   Phone: (   )    -  Fax: (   )    -  Follow Up Time:     Odin Muñoz)  Cardiovascular Disease; Internal Medicine  [address] [place], [place] [id number]  Phone: [phone]  Fax: [phone]  Follow Up Time:     Philip Navarrete)  Hematology; Internal Medicine; Medical Oncology  [address] [facility], [address] [place] [address]  Phone: [phone]  Fax: [phone]  Follow Up Time:

## 2022-10-08 NOTE — PROGRESS NOTE ADULT - PROBLEM SELECTOR PLAN 6
Chronic   - Pt is on home Finasteride 5 mg QD  - CT A/P: Heterogeneously enlarged prostate, cause mass effect and protrusion at the bladder base. - Would recommend to correlate with PSA and pt may follow up outpt with workup to exclude underlying prostatic malignancy.

## 2022-10-08 NOTE — PROGRESS NOTE ADULT - SUBJECTIVE AND OBJECTIVE BOX
MEDICAL ATTENDING NOTE    Patient is a 87y old  Male who presents with a chief complaint of PE (08 Oct 2022 10:33)      INTERVAL HPI/OVERNIGHT EVENTS: offers no new complaints today    MEDICATIONS  (STANDING):  apixaban 10 milliGRAM(s) Oral every 12 hours  chlorhexidine 2% Cloths 1 Application(s) Topical <User Schedule>  finasteride 5 milliGRAM(s) Oral daily  influenza  Vaccine (HIGH DOSE) 0.7 milliLiter(s) IntraMuscular once  lidocaine   4% Patch 1 Patch Transdermal every 24 hours  metoprolol tartrate 25 milliGRAM(s) Oral two times a day  rivastigmine patch  9.5 mG/24 Hr(s) 1 Patch Transdermal every 24 hours  simvastatin 20 milliGRAM(s) Oral at bedtime    MEDICATIONS  (PRN):      __________________________________________________  ----------------------------------------------------------------------------------  REVIEW OF SYSTEMS: negative      Vital Signs Last 24 Hrs  T(C): 36.8 (08 Oct 2022 04:29), Max: 37.8 (07 Oct 2022 20:00)  T(F): 98.3 (08 Oct 2022 04:29), Max: 100 (07 Oct 2022 20:00)  HR: 100 (08 Oct 2022 11:18) (74 - 138)  BP: 122/77 (08 Oct 2022 11:18) (96/54 - 162/90)  BP(mean): 112 (08 Oct 2022 07:00) (68 - 112)  RR: 26 (08 Oct 2022 07:00) (22 - 32)  SpO2: 91% (08 Oct 2022 11:18) (91% - 95%)    Parameters below as of 08 Oct 2022 11:18  Patient On (Oxygen Delivery Method): room air        _________________  PHYSICAL EXAM:  ---------------------------   NAD; Normocephalic;   LUNGS - no wheezing  HEART: S1 S2+   ABDOMEN: Soft, Nontender, non distended  EXTREMITIES: no cyanosis; no edema  NERVOUS SYSTEM:  Awake and alert; no focal neuro deficits appreciated    _________________________________________________  LABS:                        11.2   11.91 )-----------( 226      ( 08 Oct 2022 06:15 )             34.1     10-08    142  |  109<H>  |  20  ----------------------------<  120<H>  3.9   |  27  |  0.85    Ca    9.0      08 Oct 2022 06:15  Phos  2.9     10-08  Mg     2.2     10-08    TPro  6.8  /  Alb  2.7<L>  /  TBili  0.8  /  DBili  x   /  AST  41<H>  /  ALT  54  /  AlkPhos  71  10-07    PT/INR - ( 07 Oct 2022 01:50 )   PT: 17.4 sec;   INR: 1.48 ratio         PTT - ( 07 Oct 2022 07:45 )  PTT:>200.0 sec    CAPILLARY BLOOD GLUCOSE                    Plan of care was discussed with patient ; all questions and concerns were addressed and care was aligned with patient's wishes.             MEDICAL ATTENDING NOTE    Patient is a 87y old  Male who presents with a chief complaint of PE (08 Oct 2022 10:33)      INTERVAL HPI/OVERNIGHT EVENTS: offers no new complaints today    MEDICATIONS  (STANDING):  apixaban 10 milliGRAM(s) Oral every 12 hours  chlorhexidine 2% Cloths 1 Application(s) Topical <User Schedule>  finasteride 5 milliGRAM(s) Oral daily  influenza  Vaccine (HIGH DOSE) 0.7 milliLiter(s) IntraMuscular once  lidocaine   4% Patch 1 Patch Transdermal every 24 hours  metoprolol tartrate 25 milliGRAM(s) Oral two times a day  rivastigmine patch  9.5 mG/24 Hr(s) 1 Patch Transdermal every 24 hours  simvastatin 20 milliGRAM(s) Oral at bedtime    MEDICATIONS  (PRN):      __________________________________________________  ----------------------------------------------------------------------------------  REVIEW OF SYSTEMS: negative for fever; no HA, no SOB at rest ; pleuritic chest pain on inspiration      Vital Signs Last 24 Hrs  T(C): 36.8 (08 Oct 2022 04:29), Max: 37.8 (07 Oct 2022 20:00)  T(F): 98.3 (08 Oct 2022 04:29), Max: 100 (07 Oct 2022 20:00)  HR: 100 (08 Oct 2022 11:18) (74 - 138)  BP: 122/77 (08 Oct 2022 11:18) (96/54 - 162/90)  BP(mean): 112 (08 Oct 2022 07:00) (68 - 112)  RR: 26 (08 Oct 2022 07:00) (22 - 32)  SpO2: 91% (08 Oct 2022 11:18) (91% - 95%)    Parameters below as of 08 Oct 2022 11:18  Patient On (Oxygen Delivery Method): room air        _________________  PHYSICAL EXAM:  ---------------------------  NAD; Normocephalic;   LUNGS - no wheezing, bilateral air entry  HEART: S1 S2+   ABDOMEN: Soft, Nontender, non distended, BS+  EXTREMITIES: no cyanosis; no edema, no calf tenderness  NERVOUS SYSTEM:  Awake and alert; oriented x 3; no focal neuro deficits    _________________________________________________  LABS:                        11.2   11.91 )-----------( 226      ( 08 Oct 2022 06:15 )             34.1     10-08    142  |  109<H>  |  20  ----------------------------<  120<H>  3.9   |  27  |  0.85    Ca    9.0      08 Oct 2022 06:15  Phos  2.9     10-08  Mg     2.2     10-08    TPro  6.8  /  Alb  2.7<L>  /  TBili  0.8  /  DBili  x   /  AST  41<H>  /  ALT  54  /  AlkPhos  71  10-07    PT/INR - ( 07 Oct 2022 01:50 )   PT: 17.4 sec;   INR: 1.48 ratio         PTT - ( 07 Oct 2022 07:45 )  PTT:>200.0 sec    CAPILLARY BLOOD GLUCOSE                    Plan of care was discussed with patient ; all questions and concerns were addressed and care was aligned with patient's wishes.

## 2022-10-08 NOTE — PROGRESS NOTE ADULT - PROBLEM SELECTOR PLAN 7
Physical Therapy Treatment     ASSESSMENT:   Patient seen on 2 CARDIAC nursing unit.  Today's treatment focused on FUNCTIONAL TRANSFERS AND GAIT.  The patient is demonstrating fair progress as evidenced by IMPROVED BED MOBILITY.  At this time the patient continues to demonstrate impairments in activity tolerance, balance, coordination, safety awareness and strength which is limiting the performance of bed mobility, sit to/from stand transfers and ambulation .  Further skilled physical therapy services are reasonable and necessary to address the above impairments and performance deficits.            PLAN AND RECOMMENDATIONS:   Recommendations for Discharge:     Based on today's performance with physical therapy, the patient will require DAILY skilled PT upon discharge from hospital.  This may change as the patient's condition and medical status improve throughout their hospital stay and based on other factors such as living situation, home environment, caregiver assist and MD recommendation.       Plan:   Continue skilled PT, including the following Treatment/Interventions: Functional transfer training;Strengthening;Endurance training;Bed mobility;Gait training;Safety Education (10/15/19 0927)     PT Frequency: 5 days/week (10/15/19 0927)                      EQUIPMENT:    RW         DIAGNOSIS:   1. Non-STEMI (non-ST elevated myocardial infarction) (CMS/[facility])           PRECAUTIONS:   Precautions  Other Precautions: FALL       EDUCATION:   On this date, the patient was educated on diagnosis considerations pertaining to rehab, bed mobility, transfers and ambulation.  The response to education was: Verbalizes understanding and Needs reinforcement.     SUBJECTIVE:   Subjective: PT AGREED TO TX (10/15/19 0927)       OBJECTIVE:   Bed Mobility:    Bed Mobility  Supine to Sit: Moderate Assist (Mod) (10/15/19 0927)  Bed Mobility Comments: ELEVATED HOB AND BEDRAIL ASSIST (10/15/19 0927)     Transfers:    Transfers  Sit to  Stand: Minimal Assist (Min) (10/15/19 0927)  Stand to Sit: Minimal Assist (Min) (10/15/19 0927)  Assistive Device/: 2-wheeled walker (10/15/19 0927)  Transfer Comments 1: CUEING GIVEN FOR SAFE HAND PLACEMENT WITH TRANSFER (10/15/19 0927)      Gait:    Gait  Gait Assistance: Minimal Assist (Min) (10/15/19 0927)  Assistive Device/: 2-wheeled walker;1 Person (10/15/19 0927)  Ambulation Distance (Feet): 12 Feet (10/15/19 0927)  Pattern: Shuffle (10/15/19 0927)  Ambulation Surface: Tile (10/15/19 0927)  Gait Comments 1: L FOOT SHORT STEP SECONDARY TO A CHRONIC ACHILLES TENDON INJURY AS REPORTED BY THE PT (10/15/19 0927)  Gait Comments 2: PT BECAME VERY DIZZY AND WAS UNABLE TO CONTINUE WALKING BACK TO THE CHAIR.  THERAPIST HAD TO PULL THE RECLINER UP BEHIND THE PT AND ASSIST HER WITH SITTING. (10/15/19 0927)  Gait Comments 3:  AT TIME OF DIZZINESS (10/15/19 0927)       Exercise:  Repetitions:  10  Set: 1  Sitting:  Knee Extension, Hip Flexion, Ankle Pumps  STANDING MARCHING             GOALS:   Short Term Goals to Be Reviewed On: 10/21/19 (10/15/19 0927)  Short Term Goals = Discharge Goals: Yes (10/15/19 0927)  Bed Mobility Short Term Goal: Pt to complete bed mobility from flat surfance and supervision (10/15/19 0927)  Transfer Short Term Goal: Pt to complete sit<>sara with RW and supervision (10/15/19 0927)  Ambulation Short Term Goal: Pt to safely amb > 50 ft with RW and supervision (10/15/19 0927)       BILLING INFORMATION:   Total Treatment Time:        VTE PPX as per ICU team - Heparin drip for PE

## 2022-10-08 NOTE — PROGRESS NOTE ADULT - SUBJECTIVE AND OBJECTIVE BOX
Patient is a 87y old Male who presents with a chief complaint of PE (08 Oct 2022 11:47)    PAST MEDICAL & SURGICAL HISTORY:  HLD (hyperlipidemia)      Enlarged prostate      Dementia      H/O hernia repair        SUSIE REEDER 87y Male    BRIEF HOSPITAL COURSE:    Review of Systems: Pleuritic CP All other ROS are negative.    Allergies    No Known Allergies    Intolerances          ICU Vital Signs Last 24 Hrs  T(C): 36.8 (08 Oct 2022 15:37), Max: 37.8 (07 Oct 2022 20:00)  T(F): 98.3 (08 Oct 2022 15:37), Max: 100 (07 Oct 2022 20:00)  HR: 112 (08 Oct 2022 19:00) (75 - 142)  BP: 108/83 (08 Oct 2022 19:00) (87/56 - 162/90)  BP(mean): 92 (08 Oct 2022 19:00) (67 - 112)  ABP: --  ABP(mean): --  RR: 26 (08 Oct 2022 19:00) (21 - 35)  SpO2: 97% (08 Oct 2022 19:00) (91% - 97%)    O2 Parameters below as of 08 Oct 2022 11:18  Patient On (Oxygen Delivery Method): room air          Physical Examination:    General: NAD    HEENT: no JVD    PULM: bilateral BS    CVS: s1 s2 tachy irreg    ABD: soft NT    EXT: no edema    SKIN: warm    Neuro: No deficits          LABS:    11.2  11.91 )-----------( 226 ( 08 Oct 2022 06:15 )  34.1    10-08    142 | 109<H> | 20  ----------------------------< 120<H>  3.9 | 27 | 0.85    Ca 9.0 08 Oct 2022 06:15  Phos 2.9 10-08  Mg 2.2 10-08    TPro 6.8 / Alb 2.7<L> / TBili 0.8 / DBili x / AST 41<H> / ALT 54 / AlkPhos 71 10-07          CAPILLARY BLOOD GLUCOSE        PT/INR - ( 07 Oct 2022 01:50 ) PT: 17.4 sec; INR: 1.48 ratio       PTT - ( 07 Oct 2022 07:45 ) PTT:>200.0 sec    CULTURES:  Culture Results:  No growth to date. (10-07 @ 02:00)  Culture Results:  No growth to date. (10-07 @ 01:50)      Medications:  MEDICATIONS (STANDING):  apixaban 10 milliGRAM(s) Oral every 12 hours  chlorhexidine 2% Cloths 1 Application(s) Topical <User Schedule>  finasteride 5 milliGRAM(s) Oral daily  influenza Vaccine (HIGH DOSE) 0.7 milliLiter(s) IntraMuscular once  lidocaine 4% Patch 1 Patch Transdermal every 24 hours  metoprolol tartrate 25 milliGRAM(s) Oral two times a day  rivastigmine patch 9.5 mG/24 Hr(s) 1 Patch Transdermal every 24 hours  simvastatin 20 milliGRAM(s) Oral at bedtime    MEDICATIONS (PRN):        10-07 @ 07:01 - 10-08 @ 07:00  --------------------------------------------------------  IN: 374 mL / OUT: 401 mL / NET: -27 mL    10-08 @ 07:01 - 10-08 @ 19:54  --------------------------------------------------------  IN: 720 mL / OUT: 0 mL / NET: 720 mL        RADIOLOGY/IMAGING/ECHO  < from: CT Angio Chest PE Protocol w/ IV Cont (10.07.22 @ 00:44) >    Findings compatible with bilateral pulmonary emboli with suggestion of  right heart strain as detailed above.    Somewhat consolidative opacities in bilateral lower lobes, raising  concern for developing pneumonia. Pulmonary infarcts considered in the  differential. Pulmonary imaging follow-up in 6 weeks advised demonstrate  resolution.    Trace left pleural effusion.    Few bilateral sub-pleural based nodules, largest measuring up to 5mm in  left lower lobe. Pulmonary imaging follow-up in one year is advised if  the patient is increased risk for neoplasm.    Heterogeneously enlarged prostate, cause mass effect and protrusion at  the bladder base. Correlate with PSA and further nonemergentworkup to  exclude underlying prostatic malignancy.    Additional findings as mentioned above.    < end of copied text >      Assessment/Plan:    88 y/o M pmhx of BPH, HLD  mild dementia admit 8/7 with SOB and "abd pain" more like pleuritic and LL airspace process on CTA    On RA with good o2 sat.    His PESI score is high due to age and rapid HR    PE with clot moderate clot burden, no RV strain on prelim echo No DVT on doppler duplex.   Possible pulmonary infarcts causing pleuritic type pain.   New onset a fib with RVR today BB started.     Apixaban  Possible d/c home tomorrow

## 2022-10-08 NOTE — DISCHARGE NOTE PROVIDER - NSDCMRMEDTOKEN_GEN_ALL_CORE_FT
Eliquis Starter Pack for Treatment of DVT and PE 5 mg oral tablet: Take as described on pack.   finasteride 5 mg oral tablet: 1 tab(s) orally once a day  rivastigmine 9.5 mg/24 hr transdermal film, extended release: 1 patch transdermal once a day  simvastatin 20 mg oral tablet: 1 tab(s) orally once a day (at bedtime)   Eliquis Starter Pack for Treatment of DVT and PE 5 mg oral tablet: Take as described on pack.   finasteride 5 mg oral tablet: 1 tab(s) orally once a day  metoprolol tartrate 25 mg oral tablet: 1 tab(s) orally 2 times a day  rivastigmine 9.5 mg/24 hr transdermal film, extended release: 1 patch transdermal once a day  simvastatin 20 mg oral tablet: 1 tab(s) orally once a day (at bedtime)

## 2022-10-08 NOTE — PROGRESS NOTE ADULT - PROBLEM SELECTOR PLAN 3
Suspected reactive  CT evidence of opacities however patient was recently treated with antibiotics for PNA outpatient.   Monitor

## 2022-10-08 NOTE — DISCHARGE NOTE PROVIDER - HOSPITAL COURSE
HPI:  88 yo M with PMH HLD, BPH and dementia presents to the ED with abdominal pain. Obtained collateral history from wife Lima.  As per wife, pt had been experiencing upper abdominal pain radiating to the shoulder since the middle of September, he was seen in UC and his PCP and Pulmonologist who diagnosed him with PNA. Pt was treated for the PNA with course of Cefdinir (completed 10 day course) and course of Doxycycline (had two pills left). Symptoms have been worsening since Monday. As per wife, pt has been increasingly confused with a decrease appetite for the past few days. Pt was also complaining about abdominal pain along with back pain and SOB. Pt saw pulmonologist today who was concerned about an ulcer and therefore prescribed her tessalon perles along with protonix. When pt was examined, he was AAOx2, as per wife baseline mental status is AAOx2 but is very forgetful. Reports improvement in his abdominal pain. Denies fever, chills, palpitations, cough,, nausea, vomiting,   Denies recent travel, long car rides, plane rides, family history of clots, or sick contacts.  ED Course:   Vitals: BP: 128/61, HR: 83-->141-->132, Temp: 99.2 F, RR: 18-->35, SpO2: 94% on RA   Labs: wbc: 16.69, H/H: 12.8/39.5, PT/INR: 17.4/1.48, aPTT: 39, Ddimer: 3527, proBNP: 158-->660  trop: 32.4-->89.2    RVP negative    CT Head: No acute intracranial hemorrhage, territorial infarct or mass effect.  CT Angio Chest and CT A/P: Findings compatible with bilateral pulmonary emboli with suggestion of right heart strain as detailed above. Somewhat consolidative opacities in bilateral lower lobes, raising concern for developing pneumonia. Pulmonary infarcts considered in the differential. Trace left pleural effusion. Few bilateral sub-pleural based nodules, largest measuring up to 5mm in left lower lobe. Heterogeneously enlarged prostate, cause mass effect and protrusion at the bladder base. Correlate with PSA and further nonemergent workup to exclude underlying prostatic malignancy.  EKG: sinus tachycardia, HR: 101 --> Pt went to rapid afib in the ICU   Received Ofirmev x1, Heparin 7500 mg, Lopressor 5 mg IVP x1, Protonix 40 mg IVP x1, Zosyn x1 Vanco x1, 1L NS bolus x1 and started on Heparin drip in the ED    (07 Oct 2022 04:46)      ---  HOSPITAL COURSE: Patient started on heparin gtt for b/l submassive PE. LE Dopplers negative for DVT b/l. Transitioned from heparin gtt to Eliquis. CT with evidence of RV strain, TTE performed showed ****. Patient with new onset afib with RVR in ICU, present on monitor and confirmed on EKG. Started on metoprolol 25mg BID for rate control.    ---  CONSULTANTS:     ---  TIME SPENT:  I, the attending physician, was physically present for the key portions of the evaluation and management (E/M) service provided. The total amount of time spent reviewing the hospital notes, laboratory values, imaging findings, assessing/counseling the patient, discussing with consultant physicians, social work, nursing staff was -- minutes    ---  Primary care provider was made aware of plan for discharge:      [  ] NO     [  ] YES   HPI:  86 yo M with PMH HLD, BPH and dementia presents to the ED with abdominal pain. Obtained collateral history from wife Lima.  As per wife, pt had been experiencing upper abdominal pain radiating to the shoulder since the middle of September, he was seen in UC and his PCP and Pulmonologist who diagnosed him with PNA. Pt was treated for the PNA with course of Cefdinir (completed 10 day course) and course of Doxycycline (had two pills left). Symptoms have been worsening since Monday. As per wife, pt has been increasingly confused with a decrease appetite for the past few days. Pt was also complaining about abdominal pain along with back pain and SOB. Pt saw pulmonologist today who was concerned about an ulcer and therefore prescribed her tessalon perles along with protonix. When pt was examined, he was AAOx2, as per wife baseline mental status is AAOx2 but is very forgetful. Reports improvement in his abdominal pain. Denies fever, chills, palpitations, cough,, nausea, vomiting,   Denies recent travel, long car rides, plane rides, family history of clots, or sick contacts.  ED Course:   Vitals: BP: 128/61, HR: 83-->141-->132, Temp: 99.2 F, RR: 18-->35, SpO2: 94% on RA   Labs: wbc: 16.69, H/H: 12.8/39.5, PT/INR: 17.4/1.48, aPTT: 39, Ddimer: 3527, proBNP: 158-->660  trop: 32.4-->89.2    RVP negative    CT Head: No acute intracranial hemorrhage, territorial infarct or mass effect.  CT Angio Chest and CT A/P: Findings compatible with bilateral pulmonary emboli with suggestion of right heart strain as detailed above. Somewhat consolidative opacities in bilateral lower lobes, raising concern for developing pneumonia. Pulmonary infarcts considered in the differential. Trace left pleural effusion. Few bilateral sub-pleural based nodules, largest measuring up to 5mm in left lower lobe. Heterogeneously enlarged prostate, cause mass effect and protrusion at the bladder base. Correlate with PSA and further nonemergent workup to exclude underlying prostatic malignancy.  EKG: sinus tachycardia, HR: 101 --> Pt went to rapid afib in the ICU   Received Ofirmev x1, Heparin 7500 mg, Lopressor 5 mg IVP x1, Protonix 40 mg IVP x1, Zosyn x1 Vanco x1, 1L NS bolus x1 and started on Heparin drip in the ED    (07 Oct 2022 04:46)      ---  HOSPITAL COURSE: Patient started on heparin gtt for b/l submassive PE. LE Dopplers negative for DVT b/l. Transitioned from heparin gtt to Eliquis. CT with evidence of RV strain, TTE performed showed EF of 65%. Patient with new onset afib with RVR in ICU, present on monitor and confirmed on EKG. Started on metoprolol 25mg BID for rate control. Patient was seen and examined at bedside on day of discharge with improvement throughout hospital stay. Patient is medically optimized for discharge.     ---  CONSULTANTS:     ---  TIME SPENT:  I, the attending physician, was physically present for the key portions of the evaluation and management (E/M) service provided. The total amount of time spent reviewing the hospital notes, laboratory values, imaging findings, assessing/counseling the patient, discussing with consultant physicians, social work, nursing staff was -- minutes    ---  Primary care provider was made aware of plan for discharge:      [  ] NO     [  ] YES   HPI:  88 yo M with PMH HLD, BPH and dementia presents to the ED with abdominal pain. Obtained collateral history from wife Lima.  As per wife, pt had been experiencing upper abdominal pain radiating to the shoulder since the middle of September, he was seen in UC and his PCP and Pulmonologist who diagnosed him with PNA. Pt was treated for the PNA with course of Cefdinir (completed 10 day course) and course of Doxycycline (had two pills left). Symptoms have been worsening since Monday. As per wife, pt has been increasingly confused with a decrease appetite for the past few days. Pt was also complaining about abdominal pain along with back pain and SOB. Pt saw pulmonologist today who was concerned about an ulcer and therefore prescribed her tessalon perles along with protonix. When pt was examined, he was AAOx2, as per wife baseline mental status is AAOx2 but is very forgetful. Reports improvement in his abdominal pain. Denies fever, chills, palpitations, cough,, nausea, vomiting,   Denies recent travel, long car rides, plane rides, family history of clots, or sick contacts.  ED Course:   Vitals: BP: 128/61, HR: 83-->141-->132, Temp: 99.2 F, RR: 18-->35, SpO2: 94% on RA   Labs: wbc: 16.69, H/H: 12.8/39.5, PT/INR: 17.4/1.48, aPTT: 39, Ddimer: 3527, proBNP: 158-->660  trop: 32.4-->89.2    RVP negative    CT Head: No acute intracranial hemorrhage, territorial infarct or mass effect.  CT Angio Chest and CT A/P: Findings compatible with bilateral pulmonary emboli with suggestion of right heart strain as detailed above. Somewhat consolidative opacities in bilateral lower lobes, raising concern for developing pneumonia. Pulmonary infarcts considered in the differential. Trace left pleural effusion. Few bilateral sub-pleural based nodules, largest measuring up to 5mm in left lower lobe. Heterogeneously enlarged prostate, cause mass effect and protrusion at the bladder base. Correlate with PSA and further nonemergent workup to exclude underlying prostatic malignancy.  EKG: sinus tachycardia, HR: 101 --> Pt went to rapid afib in the ICU   Received Ofirmev x1, Heparin 7500 mg, Lopressor 5 mg IVP x1, Protonix 40 mg IVP x1, Zosyn x1 Vanco x1, 1L NS bolus x1 and started on Heparin drip in the ED    (07 Oct 2022 04:46)      ---  HOSPITAL COURSE: Patient started on heparin gtt for b/l submassive PE. LE Dopplers negative for DVT b/l. Transitioned from heparin gtt to Eliquis. CT with evidence of RV strain, TTE performed showed EF of 65%. Patient with new onset afib with RVR in ICU, present on monitor and confirmed on EKG. Started on metoprolol 25mg BID for rate control. Patient was seen and examined at bedside on day of discharge with improvement throughout hospital stay. Patient is medically optimized for discharge.

## 2022-10-08 NOTE — DISCHARGE NOTE PROVIDER - DETAILS OF MALNUTRITION DIAGNOSIS/DIAGNOSES
This patient has been assessed with a concern for Malnutrition and was treated during this hospitalization for the following Nutrition diagnosis/diagnoses:     -  10/07/2022: Severe protein-calorie malnutrition

## 2022-10-08 NOTE — PROGRESS NOTE ADULT - ASSESSMENT
Pt is a 88 y/o M pmhx of BPH, HLD who presents to [facility] ED w/ complaints of abdominal pain, worse w/ inspiration. Admitted to MICU for acute b/l pulmonary embolism with evidence of right heart strain.    1. Pulmonary embolism   2. Leukocytosis     Plan:     Neuro: Awake and alert at baseline, avoid sedating medications. Continue home rivastigmine     CV: New onset afib, start metoprolol 25mg BID for rate control, already on Eliquis for PE. F/u AM TSH. RV strain on CT scan, f/u echo read to eval for RV strain. Troponin trended now wnl.    Pulm: B/L Pulmonary embolisms seen on CT scan w/ potential RV strain. currently on RA, supplement O2 as needed for SpO2>90%. Transitioned yesterday from heparin gtt to Eliquis 10 milligrams every 12 hours for 7 days.    GI: tolerating PO diet, discontinue protonix    Renal: No active issues, continue to monitor and avoid nephrotoxic meds.    Endo: Glucose <180, Mag>2, K>4 for arrythmia suppression     Heme: Continue Eliquis 10 milligrams every 12 hours for 7 days. B/L lower extremity dopplers negative for DVT    ID: Leukocytosis downtrending, continue to trend markers of infection and will start on abx if indicated.     Dispo: DNR/DNI

## 2022-10-08 NOTE — PROGRESS NOTE ADULT - SUBJECTIVE AND OBJECTIVE BOX
Patient is a 87y old  Male who presents with a chief complaint of PE (07 Oct 2022 11:58)    Interval events: Patient tachycardic to 140s sustaining this AM, afib on monitor. EKG ordered appears to be in aflutter. Patient already on Eliquis for PE, given 5mg lopressor IVP x1 and started on metoprolol 25mg BID for new onset afib. Patient seen and examined at bedside. He is complaining of pleuritic chest pain this morning that has improved after receiving toradol. Has no other acute complaints. Denies headache, dizziness, SOB, abdominal pain, n/v/d.    Review of Systems:  Constitutional: no fever, chills, fatigue  Neuro: no headache, numbness, weakness  Resp: no cough, wheezing, shortness of breath  CVS: +pleuritic chest pain, no palpitations, leg swelling  GI: no abdominal pain, nausea, vomiting, diarrhea   : no dysuria, frequency, incontinence  Skin: no itching, burning, rashes, or lesions   Msk: no joint pain or swelling  Psych: no depression, anxiety    T(F): 98.3 (10-08-22 @ 04:29), Max: 100 (10-07-22 @ 20:00)  HR: 138 (10-08-22 @ 07:00) (74 - 138)  BP: 162/90 (10-08-22 @ 07:00) (96/54 - 162/90)  RR: 26 (10-08-22 @ 07:00) (22 - 32)  SpO2: 95% (10-08-22 @ 07:00) (92% - 95%)  Wt(kg): --        CAPILLARY BLOOD GLUCOSE        I&O's Summary    07 Oct 2022 07:01  -  08 Oct 2022 07:00  --------------------------------------------------------  IN: 374 mL / OUT: 401 mL / NET: -27 mL        Physical Exam:     Gen: elderly male, NAD  Neuro: awake and alert, appropriately responsive to questions  HEENT: NCAT, EOMI  CV: irregularly irregular rhythm, tachycardic, no murmurs rubs or gallops  Pulm: CTAB, no wheezes rales or rhonchi  GI: soft NTND, +BS  Ext: no edema b/l LE, nontender  Skin: warm and well perfused    Meds:  MEDICATIONS  (STANDING):  apixaban 10 milliGRAM(s) Oral every 12 hours  chlorhexidine 2% Cloths 1 Application(s) Topical <User Schedule>  finasteride 5 milliGRAM(s) Oral daily  influenza  Vaccine (HIGH DOSE) 0.7 milliLiter(s) IntraMuscular once  lidocaine   4% Patch 1 Patch Transdermal every 24 hours  metoprolol tartrate 25 milliGRAM(s) Oral two times a day  metoprolol tartrate Injectable 5 milliGRAM(s) IV Push once  pantoprazole  Injectable 40 milliGRAM(s) IV Push daily  rivastigmine patch  9.5 mG/24 Hr(s) 1 Patch Transdermal every 24 hours  simvastatin 20 milliGRAM(s) Oral at bedtime    MEDICATIONS  (PRN):                            11.2   11.91 )-----------( 226      ( 08 Oct 2022 06:15 )             34.1       10-08    142  |  109<H>  |  20  ----------------------------<  120<H>  3.9   |  27  |  0.85    Ca    9.0      08 Oct 2022 06:15  Phos  2.9     10-08  Mg     2.2     10-08    TPro  6.8  /  Alb  2.7<L>  /  TBili  0.8  /  DBili  x   /  AST  41<H>  /  ALT  54  /  AlkPhos  71  10-07          PT/INR - ( 07 Oct 2022 01:50 )   PT: 17.4 sec;   INR: 1.48 ratio         PTT - ( 07 Oct 2022 07:45 )  PTT:>200.0 sec    .Blood Blood-Peripheral   No growth to date. -- 10-07 @ 02:00  .Blood Blood-Peripheral   No growth to date. -- 10-07 @ 01:50            Radiology:   CT ABDOMEN AND PELVIS                         ACC: [id number] EXAM:  CT ANGIO CHEST PULM [facility]                          PROCEDURE DATE:  10/07/2022          INTERPRETATION:  CLINICAL INFORMATION: Dyspnea on exertion. Abdominal   pain.    COMPARISON: None.    CONTRAST/COMPLICATIONS:  IV Contrast: Omnipaque 350 (accession [id number]), IV contrast documented   in associated exam (accession [id number])  90 cc administered (accession   [id number]), 0 cc administered (accession [id number])  Oral Contrast: NONE  Complications: None reported at time of study completion    PROCEDURE:  CT of the Chest, Abdomen and Pelvis was performed.  Sagittal and coronal reformats were performed.    FINDINGS:  CHEST:  LUNGS AND LARGE AIRWAYS: Patent central airways. Somewhat consolidative   opacities in bilateral lower lobes, raising concern for developing   pneumonia. Pulmonary infarcts considered in the differential. Few   bilateral sub-pleural based nodules, largest measuring up to 5mm in left   lower lobe on image 54 series 2. 3 mm subpleural based nodule in right   middle lobe on image 54 series 2.  PLEURA: Trace left pleural effusion.  VESSELS: There are extensive filling defects identified within bilateral   lobar pulmonary arteries extending to bilateral lower lobe segmental to   segmental branches, compatible with pulmonary emboli. Additional small   filling defect identified within the origin of the right middle lobe,   right upper lobe and lingular branch. Atherosclerotic changes.  HEART: Mild cardiomegaly with asymmetric enlargement the right cardiac   chamber. Recommend further evaluation to exclude right heart strain in   appropriate clinical setting. No pericardial effusion.  MEDIASTINUM AND ROLY: No lymphadenopathy.  CHEST WALL AND LOWER NECK: Within normal limits.    ABDOMEN AND PELVIS:  LIVER: Within normal limits.  BILE DUCTS: Normal caliber.  GALLBLADDER: Within normal limits.  SPLEEN: Within normal limits.  PANCREAS: Within normal limits.  ADRENALS: Within normal limits.  KIDNEYS/URETERS: No hydronephrosis. Bilateral renal cysts as well as too   small to characterize hypodensities.    BLADDER: Mild wall thickening, difficult to assess secondary to   inadequate distention.  REPRODUCTIVE ORGANS: Heterogeneously enlarged prostate, cause mass effect   and protrusion at the bladder base. Correlate with PSA and further   nonemergent workup to exclude underlying prostatic malignancy.    BOWEL: Small hiatal hernia. No bowel obstruction. Normal appendix.  PERITONEUM: No ascites.  VESSELS: Atherosclerotic changes.  RETROPERITONEUM/LYMPH NODES: No lymphadenopathy.  ABDOMINAL WALL: A small fat containing umbilical hernia is noted. Right   groin hernia repair postoperative changes.    BONES: Multilevel degenerative changes of the spine.    IMPRESSION:    Findings compatible with bilateral pulmonary emboli with suggestion of   right heart strain as detailed above.    Somewhat consolidative opacities in bilateral lower lobes, raising   concern for developing pneumonia. Pulmonary infarcts considered in the   differential. Pulmonary imaging follow-up in 6 weeks advised demonstrate   resolution.    Trace left pleural effusion.    Few bilateral sub-pleural based nodules, largest measuring up to 5mm in   left lower lobe. Pulmonary imaging follow-up in one year is advised if   the patient is increased risk for neoplasm.    Heterogeneously enlarged prostate, cause mass effect and protrusion at   the bladder base. Correlate with PSA and further nonemergent workup to   exclude underlying prostatic malignancy.    Additional findings as mentioned above.    These critical results were discussed via telephone at 10/7/2022 1:03 AM   by Dr. Serrano of radiology with Dr. Lane, read-back was followed.    --- End of Report ---      U/S Doppler negative for DVT b/l      Tubes/Lines:  Peripheral UE IV    GLOBAL ISSUE/BEST PRACTICE:  Analgesia: N  Sedation: N  HOB elevation: yes  Stress ulcer prophylaxis: N  VTE prophylaxis: Y  Glycemic control: N  Nutrition: Y    CODE STATUS: DNR/DNI

## 2022-10-08 NOTE — DISCHARGE NOTE PROVIDER - PROVIDER TOKENS
PROVIDER:[TOKEN:[[id number]]],FREE:[LAST:[Coropi],PHONE:[(   )    -],FAX:[(   )    -]] PROVIDER:[TOKEN:[[id number]]],FREE:[LAST:[Coropi],PHONE:[(   )    -],FAX:[(   )    -]],PROVIDER:[TOKEN:[[id number]]],PROVIDER:[TOKEN:[[id number]]]

## 2022-10-08 NOTE — PHYSICAL THERAPY INITIAL EVALUATION ADULT - PERTINENT HX OF CURRENT PROBLEM, REHAB EVAL
87 M sent to ED for evaluation of upper abd pain for the past few days. Wife states pt has been confused the past few days, does have memory issues. Went to pulmonologist following pneumonia infection last month that improved with antibiotics. Pt with HILTON and upper abdominal pain.  Pt dx with a PE.

## 2022-10-09 LAB
ALBUMIN SERPL ELPH-MCNC: 2.3 G/DL — LOW (ref 3.3–5)
ALP SERPL-CCNC: 68 U/L — SIGNIFICANT CHANGE UP (ref 40–120)
ALT FLD-CCNC: 118 U/L — HIGH (ref 12–78)
ANION GAP SERPL CALC-SCNC: 8 MMOL/L — SIGNIFICANT CHANGE UP (ref 5–17)
AST SERPL-CCNC: 98 U/L — HIGH (ref 15–37)
BILIRUB SERPL-MCNC: 0.5 MG/DL — SIGNIFICANT CHANGE UP (ref 0.2–1.2)
BUN SERPL-MCNC: 23 MG/DL — SIGNIFICANT CHANGE UP (ref 7–23)
CALCIUM SERPL-MCNC: 8.7 MG/DL — SIGNIFICANT CHANGE UP (ref 8.5–10.1)
CHLORIDE SERPL-SCNC: 110 MMOL/L — HIGH (ref 96–108)
CO2 SERPL-SCNC: 25 MMOL/L — SIGNIFICANT CHANGE UP (ref 22–31)
CREAT SERPL-MCNC: 0.81 MG/DL — SIGNIFICANT CHANGE UP (ref 0.5–1.3)
EGFR: 85 ML/MIN/1.73M2 — SIGNIFICANT CHANGE UP
GLUCOSE SERPL-MCNC: 100 MG/DL — HIGH (ref 70–99)
HCT VFR BLD CALC: 32.2 % — LOW (ref 39–50)
HGB BLD-MCNC: 10.5 G/DL — LOW (ref 13–17)
MAGNESIUM SERPL-MCNC: 2.1 MG/DL — SIGNIFICANT CHANGE UP (ref 1.6–2.6)
MCHC RBC-ENTMCNC: 29.9 PG — SIGNIFICANT CHANGE UP (ref 27–34)
MCHC RBC-ENTMCNC: 32.6 GM/DL — SIGNIFICANT CHANGE UP (ref 32–36)
MCV RBC AUTO: 91.7 FL — SIGNIFICANT CHANGE UP (ref 80–100)
NRBC # BLD: 0 /100 WBCS — SIGNIFICANT CHANGE UP (ref 0–0)
PHOSPHATE SERPL-MCNC: 3.7 MG/DL — SIGNIFICANT CHANGE UP (ref 2.5–4.5)
PLATELET # BLD AUTO: 215 K/UL — SIGNIFICANT CHANGE UP (ref 150–400)
POTASSIUM SERPL-MCNC: 3.9 MMOL/L — SIGNIFICANT CHANGE UP (ref 3.5–5.3)
POTASSIUM SERPL-SCNC: 3.9 MMOL/L — SIGNIFICANT CHANGE UP (ref 3.5–5.3)
PROT SERPL-MCNC: 6.1 G/DL — SIGNIFICANT CHANGE UP (ref 6–8.3)
RBC # BLD: 3.51 M/UL — LOW (ref 4.2–5.8)
RBC # FLD: 13.1 % — SIGNIFICANT CHANGE UP (ref 10.3–14.5)
SODIUM SERPL-SCNC: 143 MMOL/L — SIGNIFICANT CHANGE UP (ref 135–145)
WBC # BLD: 9.03 K/UL — SIGNIFICANT CHANGE UP (ref 3.8–10.5)
WBC # FLD AUTO: 9.03 K/UL — SIGNIFICANT CHANGE UP (ref 3.8–10.5)

## 2022-10-09 PROCEDURE — 99233 SBSQ HOSP IP/OBS HIGH 50: CPT | Mod: GC

## 2022-10-09 PROCEDURE — 99233 SBSQ HOSP IP/OBS HIGH 50: CPT

## 2022-10-09 RX ADMIN — LIDOCAINE 1 PATCH: 4 CREAM TOPICAL at 11:46

## 2022-10-09 RX ADMIN — LIDOCAINE 1 PATCH: 4 CREAM TOPICAL at 19:35

## 2022-10-09 RX ADMIN — LIDOCAINE 1 PATCH: 4 CREAM TOPICAL at 23:07

## 2022-10-09 RX ADMIN — Medication 25 MILLIGRAM(S): at 17:29

## 2022-10-09 RX ADMIN — RIVASTIGMINE 1 PATCH: 4.6 PATCH, EXTENDED RELEASE TRANSDERMAL at 12:30

## 2022-10-09 RX ADMIN — RIVASTIGMINE 1 PATCH: 4.6 PATCH, EXTENDED RELEASE TRANSDERMAL at 07:22

## 2022-10-09 RX ADMIN — LIDOCAINE 1 PATCH: 4 CREAM TOPICAL at 01:00

## 2022-10-09 RX ADMIN — FINASTERIDE 5 MILLIGRAM(S): 5 TABLET, FILM COATED ORAL at 11:44

## 2022-10-09 RX ADMIN — RIVASTIGMINE 1 PATCH: 4.6 PATCH, EXTENDED RELEASE TRANSDERMAL at 11:45

## 2022-10-09 RX ADMIN — APIXABAN 10 MILLIGRAM(S): 2.5 TABLET, FILM COATED ORAL at 11:44

## 2022-10-09 RX ADMIN — SIMVASTATIN 20 MILLIGRAM(S): 20 TABLET, FILM COATED ORAL at 21:33

## 2022-10-09 RX ADMIN — RIVASTIGMINE 1 PATCH: 4.6 PATCH, EXTENDED RELEASE TRANSDERMAL at 19:36

## 2022-10-09 RX ADMIN — APIXABAN 10 MILLIGRAM(S): 2.5 TABLET, FILM COATED ORAL at 21:34

## 2022-10-09 NOTE — PROGRESS NOTE ADULT - PROBLEM SELECTOR PLAN 6
Chronic   - Pt is on home Finasteride 5 mg QD  - CT A/P: Heterogeneously enlarged prostate, cause mass effect and protrusion at the bladder base. - Have informed patient and recommended him to  follow up outpt with workup to exclude underlying prostatic malignancy. Chronic   - Pt is on home Finasteride 5 mg QD  - CT A/P: Heterogeneously enlarged prostate, cause mass effect and protrusion at the bladder base. - Have informed patient and his wife and recommended him to  follow up outpt with workup to exclude underlying prostatic malignancy.  -patient follows up with  an outpatient urologist Dr Helms" as per wife and she will ensure patient follows up with his urologist. Chronic   - Pt is on home Finasteride 5 mg QD  - CT A/P: Heterogeneously enlarged prostate, cause mass effect and protrusion at the bladder base. - Have informed patient and his wife and recommended him to  follow up outpt with workup to exclude underlying prostatic malignancy.  -patient follows up with  an outpatient urologist Dr Ron as per wife and she will ensure patient follows up with his urologist.

## 2022-10-09 NOTE — PROGRESS NOTE ADULT - PROBLEM SELECTOR PLAN 3
Resolved.   CT evidence of opacities however patient was recently treated with antibiotics for PNA outpatient.   Monitor

## 2022-10-09 NOTE — PROGRESS NOTE ADULT - SUBJECTIVE AND OBJECTIVE BOX
Patient is a 87y old  Male who presents with a chief complaint of PE     Interval events: Patient tachycardic to 140s sustaining this AM, afib on monitor. EKG ordered appears to be in aflutter. Patient already on Eliquis for PE, given 5mg lopressor IVP x1 and started on metoprolol 25mg BID for new onset afib. Patient seen and examined at bedside. Patient in no acute distress. Denies CP, SOB, abd pain or any other complaints.     Review of Systems:  Constitutional: no fever, chills, fatigue  Neuro: no headache, numbness, weakness  Resp: no cough, wheezing, shortness of breath  CVS: no CP, no palpitations, leg swelling  GI: no abdominal pain, nausea, vomiting, diarrhea   : no dysuria, frequency, incontinence    LABS:                        10.5   9.03  )-----------( 215      ( 09 Oct 2022 06:10 )             32.2     10-09    143  |  110<H>  |  23  ----------------------------<  100<H>  3.9   |  25  |  0.81    Ca    8.7      09 Oct 2022 06:10  Phos  3.7     10-09  Mg     2.1     10-09    TPro  6.1  /  Alb  2.3<L>  /  TBili  0.5  /  DBili  x   /  AST  98<H>  /  ALT  118<H>  /  AlkPhos  68  10-09      VITAL SIGNS:  T(C): 36.7 (10-09-22 @ 08:04), Max: 37.1 (10-08-22 @ 20:12)  HR: 66 (10-09-22 @ 11:00) (59 - 138)  BP: 119/65 (10-09-22 @ 11:00) (87/56 - 133/62)  RR: 23 (10-09-22 @ 11:00) (15 - 35)  SpO2: 95% (10-09-22 @ 11:00) (92% - 97%)Skin: no itching, burning, rashes, or lesions   Msk: no joint pain or swelling  Psych: no depression, anxiety      Physical Exam:     Gen: elderly male, NAD  Neuro: awake and alert, appropriately responsive to questions  HEENT: NCAT, EOMI  CV: irregularly irregular rhythm, tachycardic, no murmurs rubs or gallops  Pulm: CTAB, no wheezes rales or rhonchi  GI: soft NTND, +BS  Ext: no edema b/l LE, nontender  Skin: warm and well perfused    Meds:  MEDICATIONS  (STANDING):  apixaban 10 milliGRAM(s) Oral every 12 hours  chlorhexidine 2% Cloths 1 Application(s) Topical <User Schedule>  finasteride 5 milliGRAM(s) Oral daily  influenza  Vaccine (HIGH DOSE) 0.7 milliLiter(s) IntraMuscular once  lidocaine   4% Patch 1 Patch Transdermal every 24 hours  metoprolol tartrate 25 milliGRAM(s) Oral two times a day  metoprolol tartrate Injectable 5 milliGRAM(s) IV Push once  pantoprazole  Injectable 40 milliGRAM(s) IV Push daily  rivastigmine patch  9.5 mG/24 Hr(s) 1 Patch Transdermal every 24 hours  simvastatin 20 milliGRAM(s) Oral at bedtime    MEDICATIONS  (PRN):                 Radiology:   CT ABDOMEN AND PELVIS IC                        ACC: [id number] EXAM:  [facility]ST [facility]                          PROCEDURE DATE:  10/07/2022          INTERPRETATION:  CLINICAL INFORMATION: Dyspnea on exertion. Abdominal   pain.    COMPARISON: None.    CONTRAST/COMPLICATIONS:  IV Contrast: Omnipaque 350 (accession [id number]), IV contrast documented   in associated exam (accession [id number])  90 cc administered (accession   [id number]), 0 cc administered (accession [id number])  Oral Contrast: NONE  Complications: None reported at time of study completion    PROCEDURE:  CT of the Chest, Abdomen and Pelvis was performed.  Sagittal and coronal reformats were performed.    FINDINGS:  CHEST:  LUNGS AND LARGE AIRWAYS: Patent central airways. Somewhat consolidative   opacities in bilateral lower lobes, raising concern for developing   pneumonia. Pulmonary infarcts considered in the differential. Few   bilateral sub-pleural based nodules, largest measuring up to 5mm in left   lower lobe on image 54 series 2. 3 mm subpleural based nodule in right   middle lobe on image 54 series 2.  PLEURA: Trace left pleural effusion.  VESSELS: There are extensive filling defects identified within bilateral   lobar pulmonary arteries extending to bilateral lower lobe segmental to   segmental branches, compatible with pulmonary emboli. Additional small   filling defect identified within the origin of the right middle lobe,   right upper lobe and lingular branch. Atherosclerotic changes.  HEART: Mild cardiomegaly with asymmetric enlargement the right cardiac   chamber. Recommend further evaluation to exclude right heart strain in   appropriate clinical setting. No pericardial effusion.  MEDIASTINUM AND ROLY: No lymphadenopathy.  CHEST WALL AND LOWER NECK: Within normal limits.    ABDOMEN AND PELVIS:  LIVER: Within normal limits.  BILE DUCTS: Normal caliber.  GALLBLADDER: Within normal limits.  SPLEEN: Within normal limits.  PANCREAS: Within normal limits.  ADRENALS: Within normal limits.  KIDNEYS/URETERS: No hydronephrosis. Bilateral renal cysts as well as too   small to characterize hypodensities.    BLADDER: Mild wall thickening, difficult to assess secondary to   inadequate distention.  REPRODUCTIVE ORGANS: Heterogeneously enlarged prostate, cause mass effect   and protrusion at the bladder base. Correlate with PSA and further   nonemergent workup to exclude underlying prostatic malignancy.    BOWEL: Small hiatal hernia. No bowel obstruction. Normal appendix.  PERITONEUM: No ascites.  VESSELS: Atherosclerotic changes.  RETROPERITONEUM/LYMPH NODES: No lymphadenopathy.  ABDOMINAL WALL: A small fat containing umbilical hernia is noted. Right   groin hernia repair postoperative changes.    BONES: Multilevel degenerative changes of the spine.    IMPRESSION:    Findings compatible with bilateral pulmonary emboli with suggestion of   right heart strain as detailed above.    Somewhat consolidative opacities in bilateral lower lobes, raising   concern for developing pneumonia. Pulmonary infarcts considered in the   differential. Pulmonary imaging follow-up in 6 weeks advised demonstrate   resolution.    Trace left pleural effusion.    Few bilateral sub-pleural based nodules, largest measuring up to 5mm in   left lower lobe. Pulmonary imaging follow-up in one year is advised if   the patient is increased risk for neoplasm.    Heterogeneously enlarged prostate, cause mass effect and protrusion at   the bladder base. Correlate with PSA and further nonemergent workup to   exclude underlying prostatic malignancy.    Additional findings as mentioned above.    These critical results were discussed via telephone at 10/7/2022 1:03 AM   by Dr. Serrano of radiology with Dr. Lane, read-back was followed.    --- End of Report ---      U/S Doppler negative for DVT b/l      Tubes/Lines:  Peripheral UE IV    GLOBAL ISSUE/BEST PRACTICE:  Analgesia: N  Sedation: N  HOB elevation: yes  Stress ulcer prophylaxis: N  VTE prophylaxis: Y  Glycemic control: N  Nutrition: Y    CODE STATUS: DNR/DNI        Patient is a 87y old  Male who presents with a chief complaint of PE     Interval events: No interval events.  Patient seen and examined at bedside. Patient in no acute distress. Denies CP, SOB, abd pain or any other complaints.     Review of Systems:  Constitutional: no fever, chills, fatigue  Neuro: no headache, numbness, weakness  Resp: no cough, wheezing, shortness of breath  CVS: no CP, no palpitations, leg swelling  GI: no abdominal pain, nausea, vomiting, diarrhea   : no dysuria, frequency, incontinence    LABS:                        10.5   9.03  )-----------( 215      ( 09 Oct 2022 06:10 )             32.2     10-09    143  |  110<H>  |  23  ----------------------------<  100<H>  3.9   |  25  |  0.81    Ca    8.7      09 Oct 2022 06:10  Phos  3.7     10-09  Mg     2.1     10-09    TPro  6.1  /  Alb  2.3<L>  /  TBili  0.5  /  DBili  x   /  AST  98<H>  /  ALT  118<H>  /  AlkPhos  68  10-09      VITAL SIGNS:  T(C): 36.7 (10-09-22 @ 08:04), Max: 37.1 (10-08-22 @ 20:12)  HR: 66 (10-09-22 @ 11:00) (59 - 138)  BP: 119/65 (10-09-22 @ 11:00) (87/56 - 133/62)  RR: 23 (10-09-22 @ 11:00) (15 - 35)  SpO2: 95% (10-09-22 @ 11:00) (92% - 97%)Skin: no itching, burning, rashes, or lesions   Msk: no joint pain or swelling  Psych: no depression, anxiety      Physical Exam:     Gen: elderly male, NAD  Neuro: awake and alert, appropriately responsive to questions  HEENT: NCAT, EOMI  CV: irregularly irregular rhythm, tachycardic, no murmurs rubs or gallops  Pulm: CTAB, no wheezes rales or rhonchi  GI: soft NTND, +BS  Ext: no edema b/l LE, nontender  Skin: warm and well perfused    Meds:  MEDICATIONS  (STANDING):  apixaban 10 milliGRAM(s) Oral every 12 hours  chlorhexidine 2% Cloths 1 Application(s) Topical <User Schedule>  finasteride 5 milliGRAM(s) Oral daily  influenza  Vaccine (HIGH DOSE) 0.7 milliLiter(s) IntraMuscular once  lidocaine   4% Patch 1 Patch Transdermal every 24 hours  metoprolol tartrate 25 milliGRAM(s) Oral two times a day  metoprolol tartrate Injectable 5 milliGRAM(s) IV Push once  pantoprazole  Injectable 40 milliGRAM(s) IV Push daily  rivastigmine patch  9.5 mG/24 Hr(s) 1 Patch Transdermal every 24 hours  simvastatin 20 milliGRAM(s) Oral at bedtime    MEDICATIONS  (PRN):                 Radiology:   CT ABDOMEN AND PELVIS IC                        ACC: [id number] EXAM:  CT ANGIO CHEST PULM [facility]                          PROCEDURE DATE:  10/07/2022          INTERPRETATION:  CLINICAL INFORMATION: Dyspnea on exertion. Abdominal   pain.    COMPARISON: None.    CONTRAST/COMPLICATIONS:  IV Contrast: Omnipaque 350 (accession [id number]), IV contrast documented   in associated exam (accession [id number])  90 cc administered (accession   [id number]), 0 cc administered (accession [id number])  Oral Contrast: NONE  Complications: None reported at time of study completion    PROCEDURE:  CT of the Chest, Abdomen and Pelvis was performed.  Sagittal and coronal reformats were performed.    FINDINGS:  CHEST:  LUNGS AND LARGE AIRWAYS: Patent central airways. Somewhat consolidative   opacities in bilateral lower lobes, raising concern for developing   pneumonia. Pulmonary infarcts considered in the differential. Few   bilateral sub-pleural based nodules, largest measuring up to 5mm in left   lower lobe on image 54 series 2. 3 mm subpleural based nodule in right   middle lobe on image 54 series 2.  PLEURA: Trace left pleural effusion.  VESSELS: There are extensive filling defects identified within bilateral   lobar pulmonary arteries extending to bilateral lower lobe segmental to   segmental branches, compatible with pulmonary emboli. Additional small   filling defect identified within the origin of the right middle lobe,   right upper lobe and lingular branch. Atherosclerotic changes.  HEART: Mild cardiomegaly with asymmetric enlargement the right cardiac   chamber. Recommend further evaluation to exclude right heart strain in   appropriate clinical setting. No pericardial effusion.  MEDIASTINUM AND ROLY: No lymphadenopathy.  CHEST WALL AND LOWER NECK: Within normal limits.    ABDOMEN AND PELVIS:  LIVER: Within normal limits.  BILE DUCTS: Normal caliber.  GALLBLADDER: Within normal limits.  SPLEEN: Within normal limits.  PANCREAS: Within normal limits.  ADRENALS: Within normal limits.  KIDNEYS/URETERS: No hydronephrosis. Bilateral renal cysts as well as too   small to characterize hypodensities.    BLADDER: Mild wall thickening, difficult to assess secondary to   inadequate distention.  REPRODUCTIVE ORGANS: Heterogeneously enlarged prostate, cause mass effect   and protrusion at the bladder base. Correlate with PSA and further   nonemergent workup to exclude underlying prostatic malignancy.    BOWEL: Small hiatal hernia. No bowel obstruction. Normal appendix.  PERITONEUM: No ascites.  VESSELS: Atherosclerotic changes.  RETROPERITONEUM/LYMPH NODES: No lymphadenopathy.  ABDOMINAL WALL: A small fat containing umbilical hernia is noted. Right   groin hernia repair postoperative changes.    BONES: Multilevel degenerative changes of the spine.    IMPRESSION:    Findings compatible with bilateral pulmonary emboli with suggestion of   right heart strain as detailed above.    Somewhat consolidative opacities in bilateral lower lobes, raising   concern for developing pneumonia. Pulmonary infarcts considered in the   differential. Pulmonary imaging follow-up in 6 weeks advised demonstrate   resolution.    Trace left pleural effusion.    Few bilateral sub-pleural based nodules, largest measuring up to 5mm in   left lower lobe. Pulmonary imaging follow-up in one year is advised if   the patient is increased risk for neoplasm.    Heterogeneously enlarged prostate, cause mass effect and protrusion at   the bladder base. Correlate with PSA and further nonemergent workup to   exclude underlying prostatic malignancy.    Additional findings as mentioned above.    These critical results were discussed via telephone at 10/7/2022 1:03 AM   by Dr. Serrano of radiology with Dr. Lane, read-back was followed.    --- End of Report ---      U/S Doppler negative for DVT b/l      Tubes/Lines:  Peripheral UE IV    GLOBAL ISSUE/BEST PRACTICE:  Analgesia: N  Sedation: N  HOB elevation: yes  Stress ulcer prophylaxis: N  VTE prophylaxis: Y  Glycemic control: N  Nutrition: Y    CODE STATUS: DNR/DNI

## 2022-10-09 NOTE — PROGRESS NOTE ADULT - SUBJECTIVE AND OBJECTIVE BOX
Patient seen and examined at bedside. He reports his chest pain is now resolved  Denies headaches, nausea, vomiting, chest pain, SOB, palpitations, abdominal pain, constipation, diarrhea, melena, hematochezia, dysuria.   Interval events include tachycardia earlier today which is now resolved. Patient in NSR during my evaluation.     T(C): 37.3 (10-09-22 @ 15:55), Max: 37.3 (10-09-22 @ 15:55)  HR: 94 (10-09-22 @ 16:00) (59 - 138)  BP: 138/77 (10-09-22 @ 16:00) (106/65 - 138/77)  RR: 24 (10-09-22 @ 16:00) (15 - 35)  SpO2: 92% (10-09-22 @ 16:00) (92% - 97%)  Wt(kg): --    Physical Exam:   GENERAL: well-groomed, well-developed, NAD  HEENT: head NC/AT; conjunctiva & sclera clear; hearing grossly intact, moist mucous membranes  NECK: supple, no JVD  RESPIRATORY: CTA B/L, no wheezing, rales, rhonchi or rubs  CARDIOVASCULAR: S1&S2, RRR, no murmurs or gallops  ABDOMEN: soft, non-tender, non-distended, + Bowel sounds x4 quadrants, no guarding, rebound or rigidity  MUSCULOSKELETAL:  no clubbing, cyanosis or edema of all 4 extremities  LYMPH: no cervical lymphadenopathy  VASCULAR: Radial pulses 2+ bilaterally, no varicose veins   SKIN: warm and dry, color normal  NEUROLOGIC: AA&O X3,no sensory loss  Psych: Normal mood and affect, normal behavior

## 2022-10-09 NOTE — PROGRESS NOTE ADULT - PROBLEM SELECTOR PLAN 2
Now rate controlled.   Continue anticoagulation   Continue Lopressor for rate control- adjust dose of Lopressor as appropriate for optimal HR control  Monitor BP

## 2022-10-09 NOTE — PROGRESS NOTE ADULT - ASSESSMENT
86 yo M with PMH HLD, BPH and dementia presents to the ED with abdominal pain. Admitted for bilateral PE with right heart strain.

## 2022-10-09 NOTE — PROGRESS NOTE ADULT - NSPROGADDITIONALINFOA_GEN_ALL_CORE
Transaminitis: continue to monitor LFT's  Anemia: no signs or symptoms of active bleeding. Continue to monitor hgb.     Incidental imaging findings:   -patient with pulmonary nodules and he was instructed ot f/u with PCP to schedule repeat CT scan in one year.   -patient also with consolidative opacities in b/l lower lobes for which he was recommended to have repeat CXR or CT scan to ensure resolution in 6 weeks.   -he was informed of the above as well as the enlarged prostate and need for further work up to exclude underlying malignancy Transaminitis: continue to monitor LFT's  Anemia: no signs or symptoms of active bleeding. Continue to monitor hgb.     Incidental imaging findings:   -patient with pulmonary nodules and he was instructed ot f/u with PCP to schedule repeat CT scan in one year.   -patient also with consolidative opacities in b/l lower lobes for which he was recommended to have repeat CXR or CT scan to ensure resolution in 6 weeks.   -patient and his wife Lima was informed of the above as well as the enlarged prostate and need for further work up to exclude underlying malignancy

## 2022-10-09 NOTE — PROGRESS NOTE ADULT - ASSESSMENT
Pt is a 86 y/o M pmhx of BPH, HLD who presents to [facility] ED w/ complaints of abdominal pain, worse w/ inspiration. Admitted to MICU for acute b/l pulmonary embolism with evidence of right heart strain.    1. Pulmonary embolism   2. Leukocytosis     Plan:     Neuro: Awake and alert at baseline, avoid sedating medications. Continue home rivastigmine     CV: New onset afib, start metoprolol 25mg BID for rate control, already on Eliquis for PE. RV strain on CT scan, f/u echo read to eval for RV strain. Troponin trended now wnl.    Pulm: B/L Pulmonary embolisms seen on CT scan w/ potential RV strain. currently on RA, supplement O2 as needed for SpO2>90%. Transitioned yesterday from heparin gtt to Eliquis 10 milligrams every 12 hours for 7 days.    GI: tolerating PO diet, discontinue protonix    Renal: No active issues, continue to monitor and avoid nephrotoxic meds.    Endo: Glucose <180, Mag>2, K>4 for arrythmia suppression     Heme: Continue Eliquis 10 milligrams every 12 hours for 7 days. B/L lower extremity dopplers negative for DVT    ID: Leukocytosis downtrending, continue to trend markers of infection and will start on abx if indicated.     Dispo: DNR/DNI

## 2022-10-10 LAB
ALBUMIN SERPL ELPH-MCNC: 2.3 G/DL — LOW (ref 3.3–5)
ALP SERPL-CCNC: 71 U/L — SIGNIFICANT CHANGE UP (ref 40–120)
ALT FLD-CCNC: 192 U/L — HIGH (ref 12–78)
ANION GAP SERPL CALC-SCNC: 8 MMOL/L — SIGNIFICANT CHANGE UP (ref 5–17)
APPEARANCE UR: CLEAR — SIGNIFICANT CHANGE UP
AST SERPL-CCNC: 136 U/L — HIGH (ref 15–37)
BASOPHILS # BLD AUTO: 0.01 K/UL — SIGNIFICANT CHANGE UP (ref 0–0.2)
BASOPHILS NFR BLD AUTO: 0.1 % — SIGNIFICANT CHANGE UP (ref 0–2)
BILIRUB SERPL-MCNC: 0.5 MG/DL — SIGNIFICANT CHANGE UP (ref 0.2–1.2)
BILIRUB UR-MCNC: NEGATIVE — SIGNIFICANT CHANGE UP
BUN SERPL-MCNC: 18 MG/DL — SIGNIFICANT CHANGE UP (ref 7–23)
CALCIUM SERPL-MCNC: 8.7 MG/DL — SIGNIFICANT CHANGE UP (ref 8.5–10.1)
CHLORIDE SERPL-SCNC: 109 MMOL/L — HIGH (ref 96–108)
CO2 SERPL-SCNC: 25 MMOL/L — SIGNIFICANT CHANGE UP (ref 22–31)
COLOR SPEC: YELLOW — SIGNIFICANT CHANGE UP
CREAT SERPL-MCNC: 0.78 MG/DL — SIGNIFICANT CHANGE UP (ref 0.5–1.3)
DIFF PNL FLD: ABNORMAL
EGFR: 86 ML/MIN/1.73M2 — SIGNIFICANT CHANGE UP
EOSINOPHIL # BLD AUTO: 0.06 K/UL — SIGNIFICANT CHANGE UP (ref 0–0.5)
EOSINOPHIL NFR BLD AUTO: 0.7 % — SIGNIFICANT CHANGE UP (ref 0–6)
GLUCOSE SERPL-MCNC: 104 MG/DL — HIGH (ref 70–99)
GLUCOSE UR QL: NEGATIVE — SIGNIFICANT CHANGE UP
HCT VFR BLD CALC: 32.1 % — LOW (ref 39–50)
HGB BLD-MCNC: 10.7 G/DL — LOW (ref 13–17)
IMM GRANULOCYTES NFR BLD AUTO: 0.7 % — SIGNIFICANT CHANGE UP (ref 0–0.9)
KETONES UR-MCNC: NEGATIVE — SIGNIFICANT CHANGE UP
LEUKOCYTE ESTERASE UR-ACNC: NEGATIVE — SIGNIFICANT CHANGE UP
LYMPHOCYTES # BLD AUTO: 1.85 K/UL — SIGNIFICANT CHANGE UP (ref 1–3.3)
LYMPHOCYTES # BLD AUTO: 23.1 % — SIGNIFICANT CHANGE UP (ref 13–44)
MAGNESIUM SERPL-MCNC: 2.2 MG/DL — SIGNIFICANT CHANGE UP (ref 1.6–2.6)
MCHC RBC-ENTMCNC: 30.1 PG — SIGNIFICANT CHANGE UP (ref 27–34)
MCHC RBC-ENTMCNC: 33.3 GM/DL — SIGNIFICANT CHANGE UP (ref 32–36)
MCV RBC AUTO: 90.4 FL — SIGNIFICANT CHANGE UP (ref 80–100)
MONOCYTES # BLD AUTO: 0.89 K/UL — SIGNIFICANT CHANGE UP (ref 0–0.9)
MONOCYTES NFR BLD AUTO: 11.1 % — SIGNIFICANT CHANGE UP (ref 2–14)
NEUTROPHILS # BLD AUTO: 5.15 K/UL — SIGNIFICANT CHANGE UP (ref 1.8–7.4)
NEUTROPHILS NFR BLD AUTO: 64.3 % — SIGNIFICANT CHANGE UP (ref 43–77)
NITRITE UR-MCNC: NEGATIVE — SIGNIFICANT CHANGE UP
NRBC # BLD: 0 /100 WBCS — SIGNIFICANT CHANGE UP (ref 0–0)
PH UR: 6 — SIGNIFICANT CHANGE UP (ref 5–8)
PHOSPHATE SERPL-MCNC: 3.3 MG/DL — SIGNIFICANT CHANGE UP (ref 2.5–4.5)
PLATELET # BLD AUTO: 239 K/UL — SIGNIFICANT CHANGE UP (ref 150–400)
POTASSIUM SERPL-MCNC: 3.8 MMOL/L — SIGNIFICANT CHANGE UP (ref 3.5–5.3)
POTASSIUM SERPL-SCNC: 3.8 MMOL/L — SIGNIFICANT CHANGE UP (ref 3.5–5.3)
PROT SERPL-MCNC: 6.3 G/DL — SIGNIFICANT CHANGE UP (ref 6–8.3)
PROT UR-MCNC: 15
RBC # BLD: 3.55 M/UL — LOW (ref 4.2–5.8)
RBC # FLD: 12.7 % — SIGNIFICANT CHANGE UP (ref 10.3–14.5)
SODIUM SERPL-SCNC: 142 MMOL/L — SIGNIFICANT CHANGE UP (ref 135–145)
SP GR SPEC: 1.01 — SIGNIFICANT CHANGE UP (ref 1.01–1.02)
UROBILINOGEN FLD QL: NEGATIVE — SIGNIFICANT CHANGE UP
WBC # BLD: 8.02 K/UL — SIGNIFICANT CHANGE UP (ref 3.8–10.5)
WBC # FLD AUTO: 8.02 K/UL — SIGNIFICANT CHANGE UP (ref 3.8–10.5)

## 2022-10-10 PROCEDURE — 99233 SBSQ HOSP IP/OBS HIGH 50: CPT | Mod: GC

## 2022-10-10 PROCEDURE — 99233 SBSQ HOSP IP/OBS HIGH 50: CPT

## 2022-10-10 RX ADMIN — APIXABAN 10 MILLIGRAM(S): 2.5 TABLET, FILM COATED ORAL at 21:46

## 2022-10-10 RX ADMIN — RIVASTIGMINE 1 PATCH: 4.6 PATCH, EXTENDED RELEASE TRANSDERMAL at 07:41

## 2022-10-10 RX ADMIN — FINASTERIDE 5 MILLIGRAM(S): 5 TABLET, FILM COATED ORAL at 11:33

## 2022-10-10 RX ADMIN — SIMVASTATIN 20 MILLIGRAM(S): 20 TABLET, FILM COATED ORAL at 21:46

## 2022-10-10 RX ADMIN — APIXABAN 10 MILLIGRAM(S): 2.5 TABLET, FILM COATED ORAL at 09:29

## 2022-10-10 RX ADMIN — CHLORHEXIDINE GLUCONATE 1 APPLICATION(S): 213 SOLUTION TOPICAL at 05:31

## 2022-10-10 RX ADMIN — Medication 25 MILLIGRAM(S): at 17:09

## 2022-10-10 RX ADMIN — RIVASTIGMINE 1 PATCH: 4.6 PATCH, EXTENDED RELEASE TRANSDERMAL at 19:30

## 2022-10-10 RX ADMIN — Medication 25 MILLIGRAM(S): at 05:31

## 2022-10-10 RX ADMIN — LIDOCAINE 1 PATCH: 4 CREAM TOPICAL at 19:30

## 2022-10-10 RX ADMIN — LIDOCAINE 1 PATCH: 4 CREAM TOPICAL at 11:46

## 2022-10-10 RX ADMIN — RIVASTIGMINE 1 PATCH: 4.6 PATCH, EXTENDED RELEASE TRANSDERMAL at 11:44

## 2022-10-10 RX ADMIN — RIVASTIGMINE 1 PATCH: 4.6 PATCH, EXTENDED RELEASE TRANSDERMAL at 11:30

## 2022-10-10 NOTE — PROGRESS NOTE ADULT - SUBJECTIVE AND OBJECTIVE BOX
Patient is a 87y old  Male who presents with a chief complaint of PE (10 Oct 2022 16:54)    PAST MEDICAL & SURGICAL HISTORY:  HLD (hyperlipidemia)      Enlarged prostate      Dementia      H/O hernia repair        SUSIE REEDER   87y    Male    BRIEF HOSPITAL COURSE:    Review of Systems:    No CP or palpitations                    All other ROS are negative.    Allergies    No Known Allergies    Intolerances          ICU Vital Signs Last 24 Hrs  T(C): 36.7 (10 Oct 2022 20:44), Max: 37.2 (10 Oct 2022 03:52)  T(F): 98.1 (10 Oct 2022 20:44), Max: 99 (10 Oct 2022 03:52)  HR: 64 (10 Oct 2022 22:00) (56 - 83)  BP: 128/72 (10 Oct 2022 22:00) (110/69 - 158/89)  BP(mean): 93 (10 Oct 2022 20:00) (82 - 111)  ABP: --  ABP(mean): --  RR: 26 (10 Oct 2022 22:00) (21 - 28)  SpO2: 96% (10 Oct 2022 22:00) (92% - 97%)    O2 Parameters below as of 10 Oct 2022 20:00  Patient On (Oxygen Delivery Method): room air          Physical Examination:    General:  NAD    HEENT: No JVD    PULM: bilateral BS    CVS: s1 s2 reg    ABD: soft NT    EXT: no edema     SKIN: warm     Neuro: alert NF           LABS:                        10.7   8.02  )-----------( 239      ( 10 Oct 2022 06:26 )             32.1     10-10    142  |  109<H>  |  18  ----------------------------<  104<H>  3.8   |  25  |  0.78    Ca    8.7      10 Oct 2022 06:26  Phos  3.3     10-10  Mg     2.2     10-10    TPro  6.3  /  Alb  2.3<L>  /  TBili  0.5  /  DBili  x   /  AST  136<H>  /  ALT  192<H>  /  AlkPhos  71  10-10          CAPILLARY BLOOD GLUCOSE          Urinalysis Basic - ( 10 Oct 2022 10:00 )    Color: Yellow / Appearance: Clear / S.015 / pH: x  Gluc: x / Ketone: Negative  / Bili: Negative / Urobili: Negative   Blood: x / Protein: 15 / Nitrite: Negative   Leuk Esterase: Negative / RBC: 0-2 /HPF / WBC 0-2   Sq Epi: x / Non Sq Epi: Occasional / Bacteria: x      CULTURES:  Culture Results:   No growth to date. (10-07 @ 02:00)  Culture Results:   No growth to date. (10-07 @ 01:50)      Medications:  MEDICATIONS  (STANDING):  apixaban 10 milliGRAM(s) Oral every 12 hours  chlorhexidine 2% Cloths 1 Application(s) Topical <User Schedule>  finasteride 5 milliGRAM(s) Oral daily  influenza  Vaccine (HIGH DOSE) 0.7 milliLiter(s) IntraMuscular once  lidocaine   4% Patch 1 Patch Transdermal every 24 hours  metoprolol tartrate 25 milliGRAM(s) Oral two times a day  rivastigmine patch  9.5 mG/24 Hr(s) 1 Patch Transdermal every 24 hours  simvastatin 20 milliGRAM(s) Oral at bedtime    MEDICATIONS  (PRN):        10-09 @ :  -  10-10 @ 07:00  --------------------------------------------------------  IN: 200 mL / OUT: 1400 mL / NET: -1200 mL    10-10 @ :  -  10-10 @ 23:55  --------------------------------------------------------  IN: 590 mL / OUT: 1000 mL / NET: -410 mL        RADIOLOGY/IMAGING/ECHO  < from: TTE Echo Complete w/o Contrast w/ Doppler (10.07.22 @ 20:50) >  IMPRESSION:  Technically difficult and limited study  The left ventricular endocardium is not well-visualized. Overall, grossly   normal systolic function with an estimated LVEF of 65%.  The right ventricle is not well-visualized but appears enlarged  The aortic valve is not well-visualized  Trace physiologic MR  Moderate TR.  No significant pericardial effusion.      < end of copied text >      < from: CT Angio Chest PE Protocol w/ IV Cont (10.07.22 @ 00:44) >  IMPRESSION:    Findings compatible with bilateral pulmonary emboli with suggestion of   right heart strain as detailed above.    Somewhat consolidative opacities in bilateral lower lobes, raising   concern for developing pneumonia. Pulmonary infarcts considered in the   differential. Pulmonary imaging follow-up in 6 weeks advised demonstrate   resolution.    Trace left pleural effusion.    Few bilateral sub-pleural based nodules, largest measuring up to 5mm in   left lower lobe. Pulmonary imaging follow-up in one year is advised if   the patient is increased risk for neoplasm.    Heterogeneously enlarged prostate, cause mass effect and protrusion at   the bladder base. Correlate with PSA and further nonemergentworkup to   exclude underlying prostatic malignancy.    Additional findings as mentioned above.    These critical results were discussed via telephone at 10/7/2022 1:03 AM   by Dr. Serrano of radiology with Dr. Lane, read-back was followed.    < end of copied text >      Doppler LE neg     Assessment/Plan:      86 y/o M pmhx of BPH, HLD  mild dementia admit  with SOB and "abd pain" more like pleuritic and LL airspace process on CTA    On RA with good o2 sat.    His PESI score is high due to age and rapid HR    PE with clot moderate clot burden, no RV strain on prelim echo No DVT on doppler duplex.   Possible pulmonary infarcts causing pleuritic type pain.   New onset a fib with RVR, now in SR  Apixaban  load   Transaminitis trens > hold statin if further increase.

## 2022-10-10 NOTE — PROGRESS NOTE ADULT - NS ATTEST RISK PROBLEM GEN_ALL_CORE FT
Acute PE, now onset AF
Submassive PE
bilateral PE with arrythmia and hemodynamic instability requiring ICU care; also on anticoagulation.

## 2022-10-10 NOTE — PROGRESS NOTE ADULT - PROBLEM SELECTOR PLAN 6
Chronic   - Pt is on home Finasteride 5 mg QD  - CT A/P: Heterogeneously enlarged prostate, cause mass effect and protrusion at the bladder base. - Would recommend to correlate with PSA and pt may follow up outpt with workup to exclude underlying prostatic malignancy. Chronic   - Pt is on home Finasteride 5 mg QD  - CT A/P: Heterogeneously enlarged prostate, cause mass effect and protrusion at the bladder base.   Outpatient follow up with urologist.

## 2022-10-10 NOTE — PROGRESS NOTE ADULT - ASSESSMENT
Pt is a 86 y/o M pmhx of BPH, HLD who presents to [facility] ED w/ complaints of abdominal pain, worse w/ inspiration. Admitted to MICU for acute b/l pulmonary embolism with evidence of right heart strain.    1. Pulmonary embolism   2. Leukocytosis     Plan:     Neuro: Awake and alert at baseline, avoid sedating medications. Continue home rivastigmine     CV: New onset afib, start metoprolol 25mg BID for rate control, already on Eliquis for PE. RV strain on CT scan, f/u echo read to eval for RV strain. Troponin trended now wnl.    Pulm: B/L Pulmonary embolisms seen on CT scan w/ potential RV strain. currently on RA, supplement O2 as needed for SpO2>90%. Transitioned yesterday from heparin gtt to Eliquis 10 milligrams every 12 hours for 7 days.    GI: tolerating PO diet, discontinue protonix    Renal: No active issues, continue to monitor and avoid nephrotoxic meds.    Endo: Glucose <180, Mag>2, K>4 for arrythmia suppression     Heme: Continue Eliquis 10 milligrams every 12 hours for 7 days. B/L lower extremity dopplers negative for DVT    ID: Leukocytosis downtrending, continue to trend markers of infection and will start on abx if indicated.     Dispo: DNR/DNI

## 2022-10-10 NOTE — PROGRESS NOTE ADULT - PROBLEM SELECTOR PLAN 2
AFIB with RVR- rate control with BB  Continue anticoagulation   Continue Lopressor for rate control- adjust dose of Lopressor as appropriate for optimal HR control  Monitor BP AFIB with resolved  rate control with BB  Continue anticoagulation   Continue Lopressor for rate control- adjust dose of Lopressor as appropriate for optimal HR control  Monitor BP

## 2022-10-10 NOTE — PROGRESS NOTE ADULT - SUBJECTIVE AND OBJECTIVE BOX
MEDICAL ATTENDING NOTE    Patient is a 87y old  Male who presents with a chief complaint of PE (10 Oct 2022 12:49)      INTERVAL HPI/OVERNIGHT EVENTS: offers no new complaints today    MEDICATIONS  (STANDING):  apixaban 10 milliGRAM(s) Oral every 12 hours  chlorhexidine 2% Cloths 1 Application(s) Topical <User Schedule>  finasteride 5 milliGRAM(s) Oral daily  influenza  Vaccine (HIGH DOSE) 0.7 milliLiter(s) IntraMuscular once  lidocaine   4% Patch 1 Patch Transdermal every 24 hours  metoprolol tartrate 25 milliGRAM(s) Oral two times a day  rivastigmine patch  9.5 mG/24 Hr(s) 1 Patch Transdermal every 24 hours  simvastatin 20 milliGRAM(s) Oral at bedtime    MEDICATIONS  (PRN):      __________________________________________________  ----------------------------------------------------------------------------------  REVIEW OF SYSTEMS: negative      Vital Signs Last 24 Hrs  T(C): 36.8 (10 Oct 2022 16:05), Max: 37.2 (09 Oct 2022 20:50)  T(F): 98.3 (10 Oct 2022 16:05), Max: 99 (10 Oct 2022 03:52)  HR: 71 (10 Oct 2022 16:00) (56 - 90)  BP: 122/68 (10 Oct 2022 16:00) (110/69 - 158/89)  BP(mean): 90 (10 Oct 2022 16:00) (81 - 111)  RR: 26 (10 Oct 2022 16:00) (20 - 32)  SpO2: 96% (10 Oct 2022 16:00) (90% - 97%)    Parameters below as of 10 Oct 2022 16:00  Patient On (Oxygen Delivery Method): room air        _________________  PHYSICAL EXAM:  ---------------------------   NAD; Normocephalic;   LUNGS - no wheezing  HEART: S1 S2+   ABDOMEN: Soft, Nontender, non distended  EXTREMITIES: no cyanosis; no edema  NERVOUS SYSTEM:  Awake and alert; no focal neuro deficits appreciated    _________________________________________________  LABS:                        10.7   8.02  )-----------( 239      ( 10 Oct 2022 06:26 )             32.1     10-10    142  |  109<H>  |  18  ----------------------------<  104<H>  3.8   |  25  |  0.78    Ca    8.7      10 Oct 2022 06:26  Phos  3.3     10-10  Mg     2.2     10-10    TPro  6.3  /  Alb  2.3<L>  /  TBili  0.5  /  DBili  x   /  AST  136<H>  /  ALT  192<H>  /  AlkPhos  71  10-10      Urinalysis Basic - ( 10 Oct 2022 10:00 )    Color: Yellow / Appearance: Clear / S.015 / pH: x  Gluc: x / Ketone: Negative  / Bili: Negative / Urobili: Negative   Blood: x / Protein: 15 / Nitrite: Negative   Leuk Esterase: Negative / RBC: 0-2 /HPF / WBC 0-2   Sq Epi: x / Non Sq Epi: Occasional / Bacteria: x      CAPILLARY BLOOD GLUCOSE                    Plan of care was discussed with patient and spouse at bedside in ICU; all questions and concerns were addressed and care was aligned with patient's wishes.             MEDICAL ATTENDING NOTE    Patient is a 87y old  Male who presents with a chief complaint of PE (10 Oct 2022 12:49)      INTERVAL HPI/OVERNIGHT EVENTS: offers no new complaints today    MEDICATIONS  (STANDING):  apixaban 10 milliGRAM(s) Oral every 12 hours  chlorhexidine 2% Cloths 1 Application(s) Topical <User Schedule>  finasteride 5 milliGRAM(s) Oral daily  influenza  Vaccine (HIGH DOSE) 0.7 milliLiter(s) IntraMuscular once  lidocaine   4% Patch 1 Patch Transdermal every 24 hours  metoprolol tartrate 25 milliGRAM(s) Oral two times a day  rivastigmine patch  9.5 mG/24 Hr(s) 1 Patch Transdermal every 24 hours  simvastatin 20 milliGRAM(s) Oral at bedtime    MEDICATIONS  (PRN):      __________________________________________________  ----------------------------------------------------------------------------------  REVIEW OF SYSTEMS: negative for chest pain of SOB;       Vital Signs Last 24 Hrs  T(C): 36.8 (10 Oct 2022 16:05), Max: 37.2 (09 Oct 2022 20:50)  T(F): 98.3 (10 Oct 2022 16:05), Max: 99 (10 Oct 2022 03:52)  HR: 71 (10 Oct 2022 16:00) (56 - 90)  BP: 122/68 (10 Oct 2022 16:00) (110/69 - 158/89)  BP(mean): 90 (10 Oct 2022 16:00) (81 - 111)  RR: 26 (10 Oct 2022 16:00) (20 - 32)  SpO2: 96% (10 Oct 2022 16:00) (90% - 97%)    Parameters below as of 10 Oct 2022 16:00  Patient On (Oxygen Delivery Method): room air        _________________  PHYSICAL EXAM:  ---------------------------   NAD; Normocephalic;   LUNGS - no wheezing  HEART: S1 S2+   ABDOMEN: Soft, Nontender, non distended, BS+  EXTREMITIES: no cyanosis; no edema  NERVOUS SYSTEM:  Awake and alert; no focal neuro deficits appreciated    _________________________________________________  LABS:                        10.7   8.02  )-----------( 239      ( 10 Oct 2022 06:26 )             32.1     10-10    142  |  109<H>  |  18  ----------------------------<  104<H>  3.8   |  25  |  0.78    Ca    8.7      10 Oct 2022 06:26  Phos  3.3     10-10  Mg     2.2     10-10    TPro  6.3  /  Alb  2.3<L>  /  TBili  0.5  /  DBili  x   /  AST  136<H>  /  ALT  192<H>  /  AlkPhos  71  10-10      Urinalysis Basic - ( 10 Oct 2022 10:00 )    Color: Yellow / Appearance: Clear / S.015 / pH: x  Gluc: x / Ketone: Negative  / Bili: Negative / Urobili: Negative   Blood: x / Protein: 15 / Nitrite: Negative   Leuk Esterase: Negative / RBC: 0-2 /HPF / WBC 0-2   Sq Epi: x / Non Sq Epi: Occasional / Bacteria: x      CAPILLARY BLOOD GLUCOSE                    Plan of care was discussed with patient and spouse at bedside in ICU; all questions and concerns were addressed and care was aligned with patient's wishes.

## 2022-10-10 NOTE — PROGRESS NOTE ADULT - PROBLEM SELECTOR PLAN 3
Suspected reactive  CT evidence of opacities however patient was recently treated with antibiotics for PNA outpatient.   Monitor likely reactive  CT evidence of opacities however patient was recently treated with antibiotics for PNA outpatient.   Monitor

## 2022-10-10 NOTE — CHART NOTE - NSCHARTNOTEFT_GEN_A_CORE
Patient seen and chart reviewed.    Patient was admitted for acute PE with right heart strain    Vital Signs Last 24 Hrs  T(C): 36.8 (07 Oct 2022 11:38), Max: 38.8 (06 Oct 2022 23:44)  T(F): 98.3 (07 Oct 2022 11:38), Max: 101.8 (06 Oct 2022 23:44)  HR: 79 (07 Oct 2022 16:00) (67 - 141)  BP: 108/60 (07 Oct 2022 16:00) (93/57 - 132/73)  BP(mean): 78 (07 Oct 2022 16:00) (69 - 94)  RR: 22 (07 Oct 2022 16:00) (16 - 35)  SpO2: 93% (07 Oct 2022 16:00) (92% - 97%)    Parameters below as of 07 Oct 2022 16:00  Patient On (Oxygen Delivery Method): room air    Exam- AAOx 3; neuro- grossly intact; NAD      LABS:                        11.2   15.11 )-----------( 207      ( 07 Oct 2022 09:10 )             34.9     10-06    138  |  104  |  25<H>  ----------------------------<  143<H>  4.2   |  25  |  1.20    Ca    9.5      06 Oct 2022 19:25    TPro  8.3  /  Alb  3.4  /  TBili  0.9  /  DBili  x   /  AST  31  /  ALT  54  /  AlkPhos  81  10-06        Met with patient and family (spouse) - the current diagnosis, diagnostic/therapeutic options and plan of care were discussed in details.  All questions and concerns were addressed.  Please see H&P from this morning
Assessment: Pt seen for nutrition follow-up. Chart reviewed, hospital course noted.    Brief hx: Pt is a 86 yo M with PMH HLD, BPH and dementia presents to the ED with abdominal pain. Admitted for bilateral PE with right heart strain.       Visited pt at bedside this am. Pt continues on DASH/TLC diet. Pt reports fair appetite/intake. Tolerating diet well. Reports consuming ~50% of breakfast meal this am. No po intakes documented thus far. Pt feeds self, minimal assistance needed with tray set-up. No chewing/swallowing difficulties. Denies N/V/D/C. +BM 10/9. Discussed nutritional supplements for addtl kcal/protein; pt declined at this time. Encouraged po intake of meals/fluids. Pt with no nutritional questions/concerns at this time. Awaiting transfer to [facility].     Factors impacting intake: [X] none [ ] nausea  [ ] vomiting [ ] diarrhea [ ] constipation  [ ]chewing problems [ ] swallowing issues  [ ] other:     Diet Prescription: Diet, DASH/TLC:   Sodium & Cholesterol Restricted (10-07-22 @ 15:34)    Intake: fair    Current Weight: Weight (kg): 88 (10-09 @ 17:15), 199.2# 10/10  % Weight Change    Pertinent Medications: MEDICATIONS  (STANDING):  apixaban 10 milliGRAM(s) Oral every 12 hours  chlorhexidine 2% Cloths 1 Application(s) Topical <User Schedule>  finasteride 5 milliGRAM(s) Oral daily  influenza  Vaccine (HIGH DOSE) 0.7 milliLiter(s) IntraMuscular once  lidocaine   4% Patch 1 Patch Transdermal every 24 hours  metoprolol tartrate 25 milliGRAM(s) Oral two times a day  rivastigmine patch  9.5 mG/24 Hr(s) 1 Patch Transdermal every 24 hours  simvastatin 20 milliGRAM(s) Oral at bedtime    MEDICATIONS  (PRN):    Pertinent Labs: 10-10 Na142 mmol/L Glu 104 mg/dL<H> K+ 3.8 mmol/L Cr  0.78 mg/dL BUN 18 mg/dL 10-10 Phos 3.3 mg/dL 10-10 Alb 2.3 g/dL<L>    Skin: no pressure ulcers noted    Estimated Needs:   [X] no change since previous assessment: based on 198#/89.8kg  25-30kcal/kg (1796-2245kcal)  1-1.2g pro/kg (90-108gm protein)  [ ] recalculated:     Previous Nutrition Diagnosis:   [ ] Inadequate Energy Intake [ ]Inadequate Oral Intake [ ] Excessive Energy Intake   [ ] Underweight [ ] Increased Nutrient Needs [ ] Overweight/Obesity   [ ] Altered GI Function [ ] Unintended Weight Loss [ ] Food & Nutrition Related Knowledge Deficit [X] Malnutrition     Nutrition Diagnosis is [X] ongoing  [ ] resolved [ ] not applicable     New Nutrition Diagnosis: [X] not applicable     Interventions:   Recommend  [ ] Change Diet To:  [ ] Nutrition Supplement  [ ] Nutrition Support  [X] Other: Continue nutrition care plan, DASH/TLC diet, assistance/encouragement at meal times as needed    Monitoring and Evaluation:   [X] PO intake [ x ] Tolerance to diet prescription [ x ] weights [ x ] labs[ x ] follow up per protocol  [X] other: s/s GI distress, bowel function, skin integrity/edema
Pt now w/ elevated troponin and pro BNP, runs of sinus tach, remains HD stable.   Will get EKG to eval for Afib.    Transfer center contacted for potential tx for higher level of care.   No available bed at [facility] ICU, and pt may only require tele service.    Since pt HD stable at this time, tx denied, continue pt on heparin gtt for 24 hours, trend troponin and pro BNP.   If pt becomes HD unstable and hypotensive can push tPa.

## 2022-10-10 NOTE — PROGRESS NOTE ADULT - SUBJECTIVE AND OBJECTIVE BOX
Patient is a 87y old  Male who presents with a chief complaint of PE     Interval events: No interval events.  Patient seen and examined at bedside. Patient in no acute distress. Denies CP, SOB, abd pain or any other complaints.     Review of Systems:  Constitutional: no fever, chills, fatigue  Neuro: no headache, numbness, weakness  Resp: no cough, wheezing, shortness of breath  CVS: no CP, no palpitations, leg swelling  GI: no abdominal pain, nausea, vomiting, diarrhea   : no dysuria, frequency, incontinence    LABS:                        10.7   8.02  )-----------( 239      ( 10 Oct 2022 06:26 )             32.1     10-10    142  |  109<H>  |  18  ----------------------------<  104<H>  3.8   |  25  |  0.78    Ca    8.7      10 Oct 2022 06:26  Phos  3.3     10-10  Mg     2.2     10-10    TPro  6.3  /  Alb  2.3<L>  /  TBili  0.5  /  DBili  x   /  AST  136<H>  /  ALT  192<H>  /  AlkPhos  71  10-10      Urinalysis Basic - ( 10 Oct 2022 10:00 )    Color: Yellow / Appearance: Clear / S.015 / pH: x  Gluc: x / Ketone: Negative  / Bili: Negative / Urobili: Negative   Blood: x / Protein: 15 / Nitrite: Negative   Leuk Esterase: Negative / RBC: 0-2 /HPF / WBC 0-2   Sq Epi: x / Non Sq Epi: Occasional / Bacteria: x        VITAL SIGNS:  T(C): 36.8 (10-10-22 @ 12:41), Max: 37.3 (10-09-22 @ 15:55)  HR: 65 (10-10-22 @ 12:00) (56 - 104)  BP: 121/66 (10-10-22 @ 12:00) (107/57 - 153/74)  RR: 24 (10-10-22 @ 12:00) (20 - 32)  SpO2: 96% (10-10-22 @ 12:00) (90% - 97%)    Physical Exam:     Gen: elderly male, NAD  Neuro: awake and alert, appropriately responsive to questions  HEENT: NCAT, EOMI  CV: irregularly irregular rhythm, tachycardic, no murmurs rubs or gallops  Pulm: CTAB, no wheezes rales or rhonchi  GI: soft NTND, +BS  Ext: no edema b/l LE, nontender  Skin: warm and well perfused    Meds:  MEDICATIONS  (STANDING):  apixaban 10 milliGRAM(s) Oral every 12 hours  chlorhexidine 2% Cloths 1 Application(s) Topical <User Schedule>  finasteride 5 milliGRAM(s) Oral daily  influenza  Vaccine (HIGH DOSE) 0.7 milliLiter(s) IntraMuscular once  lidocaine   4% Patch 1 Patch Transdermal every 24 hours  metoprolol tartrate 25 milliGRAM(s) Oral two times a day  metoprolol tartrate Injectable 5 milliGRAM(s) IV Push once  pantoprazole  Injectable 40 milliGRAM(s) IV Push daily  rivastigmine patch  9.5 mG/24 Hr(s) 1 Patch Transdermal every 24 hours  simvastatin 20 milliGRAM(s) Oral at bedtime    MEDICATIONS  (PRN):                 Radiology:   CT ABDOMEN AND PELVIS IC                        ACC: [id number] [facility]                          PROCEDURE DATE:  10/07/2022          INTERPRETATION:  CLINICAL INFORMATION: Dyspnea on exertion. Abdominal   pain.    COMPARISON: None.    CONTRAST/COMPLICATIONS:  IV Contrast: Omnipaque 350 (accession [id number]), IV contrast documented   in associated exam (accession [id number])  90 cc administered (accession   [id number]), 0 cc administered (accession [id number])  Oral Contrast: NONE  Complications: None reported at time of study completion    PROCEDURE:  CT of the Chest, Abdomen and Pelvis was performed.  Sagittal and coronal reformats were performed.    FINDINGS:  CHEST:  LUNGS AND LARGE AIRWAYS: Patent central airways. Somewhat consolidative   opacities in bilateral lower lobes, raising concern for developing   pneumonia. Pulmonary infarcts considered in the differential. Few   bilateral sub-pleural based nodules, largest measuring up to 5mm in left   lower lobe on image 54 series 2. 3 mm subpleural based nodule in right   middle lobe on image 54 series 2.  PLEURA: Trace left pleural effusion.  VESSELS: There are extensive filling defects identified within bilateral   lobar pulmonary arteries extending to bilateral lower lobe segmental to   segmental branches, compatible with pulmonary emboli. Additional small   filling defect identified within the origin of the right middle lobe,   right upper lobe and lingular branch. Atherosclerotic changes.  HEART: Mild cardiomegaly with asymmetric enlargement the right cardiac   chamber. Recommend further evaluation to exclude right heart strain in   appropriate clinical setting. No pericardial effusion.  MEDIASTINUM AND ROLY: No lymphadenopathy.  CHEST WALL AND LOWER NECK: Within normal limits.    ABDOMEN AND PELVIS:  LIVER: Within normal limits.  BILE DUCTS: Normal caliber.  GALLBLADDER: Within normal limits.  SPLEEN: Within normal limits.  PANCREAS: Within normal limits.  ADRENALS: Within normal limits.  KIDNEYS/URETERS: No hydronephrosis. Bilateral renal cysts as well as too   small to characterize hypodensities.    BLADDER: Mild wall thickening, difficult to assess secondary to   inadequate distention.  REPRODUCTIVE ORGANS: Heterogeneously enlarged prostate, cause mass effect   and protrusion at the bladder base. Correlate with PSA and further   nonemergent workup to exclude underlying prostatic malignancy.    BOWEL: Small hiatal hernia. No bowel obstruction. Normal appendix.  PERITONEUM: No ascites.  VESSELS: Atherosclerotic changes.  RETROPERITONEUM/LYMPH NODES: No lymphadenopathy.  ABDOMINAL WALL: A small fat containing umbilical hernia is noted. Right   groin hernia repair postoperative changes.    BONES: Multilevel degenerative changes of the spine.    IMPRESSION:    Findings compatible with bilateral pulmonary emboli with suggestion of   right heart strain as detailed above.    Somewhat consolidative opacities in bilateral lower lobes, raising   concern for developing pneumonia. Pulmonary infarcts considered in the   differential. Pulmonary imaging follow-up in 6 weeks advised demonstrate   resolution.    Trace left pleural effusion.    Few bilateral sub-pleural based nodules, largest measuring up to 5mm in   left lower lobe. Pulmonary imaging follow-up in one year is advised if   the patient is increased risk for neoplasm.    Heterogeneously enlarged prostate, cause mass effect and protrusion at   the bladder base. Correlate with PSA and further nonemergent workup to   exclude underlying prostatic malignancy.    Additional findings as mentioned above.    These critical results were discussed via telephone at 10/7/2022 1:03 AM   by Dr. Serrano of radiology with Dr. Lane, read-back was followed.    --- End of Report ---      U/S Doppler negative for DVT b/l      Tubes/Lines:  Peripheral UE IV    GLOBAL ISSUE/BEST PRACTICE:  Analgesia: N  Sedation: N  HOB elevation: yes  Stress ulcer prophylaxis: N  VTE prophylaxis: Y  Glycemic control: N  Nutrition: Y    CODE STATUS: DNR/DNI

## 2022-10-11 DIAGNOSIS — R74.01 ELEVATION OF LEVELS OF LIVER TRANSAMINASE LEVELS: ICD-10-CM

## 2022-10-11 LAB
ALBUMIN SERPL ELPH-MCNC: 2.5 G/DL — LOW (ref 3.3–5)
ALP SERPL-CCNC: 77 U/L — SIGNIFICANT CHANGE UP (ref 40–120)
ALT FLD-CCNC: 222 U/L — HIGH (ref 12–78)
ANION GAP SERPL CALC-SCNC: 9 MMOL/L — SIGNIFICANT CHANGE UP (ref 5–17)
APTT BLD: 35.5 SEC — SIGNIFICANT CHANGE UP (ref 27.5–35.5)
APTT BLD: 37.4 SEC — HIGH (ref 27.5–35.5)
AST SERPL-CCNC: 136 U/L — HIGH (ref 15–37)
BASOPHILS # BLD AUTO: 0.03 K/UL — SIGNIFICANT CHANGE UP (ref 0–0.2)
BASOPHILS NFR BLD AUTO: 0.4 % — SIGNIFICANT CHANGE UP (ref 0–2)
BILIRUB SERPL-MCNC: 0.5 MG/DL — SIGNIFICANT CHANGE UP (ref 0.2–1.2)
BUN SERPL-MCNC: 15 MG/DL — SIGNIFICANT CHANGE UP (ref 7–23)
CALCIUM SERPL-MCNC: 9 MG/DL — SIGNIFICANT CHANGE UP (ref 8.5–10.1)
CHLORIDE SERPL-SCNC: 106 MMOL/L — SIGNIFICANT CHANGE UP (ref 96–108)
CO2 SERPL-SCNC: 25 MMOL/L — SIGNIFICANT CHANGE UP (ref 22–31)
CREAT SERPL-MCNC: 0.73 MG/DL — SIGNIFICANT CHANGE UP (ref 0.5–1.3)
EGFR: 88 ML/MIN/1.73M2 — SIGNIFICANT CHANGE UP
EOSINOPHIL # BLD AUTO: 0.07 K/UL — SIGNIFICANT CHANGE UP (ref 0–0.5)
EOSINOPHIL NFR BLD AUTO: 0.9 % — SIGNIFICANT CHANGE UP (ref 0–6)
GLUCOSE SERPL-MCNC: 110 MG/DL — HIGH (ref 70–99)
HCT VFR BLD CALC: 34.1 % — LOW (ref 39–50)
HGB BLD-MCNC: 11.1 G/DL — LOW (ref 13–17)
IMM GRANULOCYTES NFR BLD AUTO: 0.8 % — SIGNIFICANT CHANGE UP (ref 0–0.9)
INR BLD: 1.92 RATIO — HIGH (ref 0.88–1.16)
INR BLD: 1.95 RATIO — HIGH (ref 0.88–1.16)
LYMPHOCYTES # BLD AUTO: 1.9 K/UL — SIGNIFICANT CHANGE UP (ref 1–3.3)
LYMPHOCYTES # BLD AUTO: 24.1 % — SIGNIFICANT CHANGE UP (ref 13–44)
MAGNESIUM SERPL-MCNC: 2.2 MG/DL — SIGNIFICANT CHANGE UP (ref 1.6–2.6)
MCHC RBC-ENTMCNC: 29.3 PG — SIGNIFICANT CHANGE UP (ref 27–34)
MCHC RBC-ENTMCNC: 32.6 GM/DL — SIGNIFICANT CHANGE UP (ref 32–36)
MCV RBC AUTO: 90 FL — SIGNIFICANT CHANGE UP (ref 80–100)
MONOCYTES # BLD AUTO: 0.8 K/UL — SIGNIFICANT CHANGE UP (ref 0–0.9)
MONOCYTES NFR BLD AUTO: 10.2 % — SIGNIFICANT CHANGE UP (ref 2–14)
NEUTROPHILS # BLD AUTO: 5.01 K/UL — SIGNIFICANT CHANGE UP (ref 1.8–7.4)
NEUTROPHILS NFR BLD AUTO: 63.6 % — SIGNIFICANT CHANGE UP (ref 43–77)
NRBC # BLD: 0 /100 WBCS — SIGNIFICANT CHANGE UP (ref 0–0)
PHOSPHATE SERPL-MCNC: 3.2 MG/DL — SIGNIFICANT CHANGE UP (ref 2.5–4.5)
PLATELET # BLD AUTO: 264 K/UL — SIGNIFICANT CHANGE UP (ref 150–400)
POTASSIUM SERPL-MCNC: 4.4 MMOL/L — SIGNIFICANT CHANGE UP (ref 3.5–5.3)
POTASSIUM SERPL-SCNC: 4.4 MMOL/L — SIGNIFICANT CHANGE UP (ref 3.5–5.3)
PROT SERPL-MCNC: 6.6 G/DL — SIGNIFICANT CHANGE UP (ref 6–8.3)
PROTHROM AB SERPL-ACNC: 22.6 SEC — HIGH (ref 10.5–13.4)
PROTHROM AB SERPL-ACNC: 23 SEC — HIGH (ref 10.5–13.4)
RBC # BLD: 3.79 M/UL — LOW (ref 4.2–5.8)
RBC # FLD: 12.7 % — SIGNIFICANT CHANGE UP (ref 10.3–14.5)
SODIUM SERPL-SCNC: 140 MMOL/L — SIGNIFICANT CHANGE UP (ref 135–145)
WBC # BLD: 7.87 K/UL — SIGNIFICANT CHANGE UP (ref 3.8–10.5)
WBC # FLD AUTO: 7.87 K/UL — SIGNIFICANT CHANGE UP (ref 3.8–10.5)

## 2022-10-11 PROCEDURE — 99233 SBSQ HOSP IP/OBS HIGH 50: CPT

## 2022-10-11 PROCEDURE — 99233 SBSQ HOSP IP/OBS HIGH 50: CPT | Mod: GC

## 2022-10-11 RX ADMIN — SIMVASTATIN 20 MILLIGRAM(S): 20 TABLET, FILM COATED ORAL at 22:02

## 2022-10-11 RX ADMIN — RIVASTIGMINE 1 PATCH: 4.6 PATCH, EXTENDED RELEASE TRANSDERMAL at 14:11

## 2022-10-11 RX ADMIN — LIDOCAINE 1 PATCH: 4 CREAM TOPICAL at 00:00

## 2022-10-11 RX ADMIN — APIXABAN 10 MILLIGRAM(S): 2.5 TABLET, FILM COATED ORAL at 09:55

## 2022-10-11 RX ADMIN — RIVASTIGMINE 1 PATCH: 4.6 PATCH, EXTENDED RELEASE TRANSDERMAL at 07:05

## 2022-10-11 RX ADMIN — LIDOCAINE 1 PATCH: 4 CREAM TOPICAL at 20:02

## 2022-10-11 RX ADMIN — LIDOCAINE 1 PATCH: 4 CREAM TOPICAL at 12:49

## 2022-10-11 RX ADMIN — Medication 25 MILLIGRAM(S): at 05:43

## 2022-10-11 RX ADMIN — CHLORHEXIDINE GLUCONATE 1 APPLICATION(S): 213 SOLUTION TOPICAL at 05:43

## 2022-10-11 RX ADMIN — RIVASTIGMINE 1 PATCH: 4.6 PATCH, EXTENDED RELEASE TRANSDERMAL at 19:45

## 2022-10-11 RX ADMIN — RIVASTIGMINE 1 PATCH: 4.6 PATCH, EXTENDED RELEASE TRANSDERMAL at 12:48

## 2022-10-11 RX ADMIN — FINASTERIDE 5 MILLIGRAM(S): 5 TABLET, FILM COATED ORAL at 11:41

## 2022-10-11 RX ADMIN — Medication 25 MILLIGRAM(S): at 17:03

## 2022-10-11 RX ADMIN — Medication 100 MILLIGRAM(S): at 11:41

## 2022-10-11 RX ADMIN — APIXABAN 10 MILLIGRAM(S): 2.5 TABLET, FILM COATED ORAL at 22:02

## 2022-10-11 NOTE — PROGRESS NOTE ADULT - ASSESSMENT
Pt is a 88 y/o M pmhx of BPH, HLD who presents to [facility] ED w/ complaints of abdominal pain, worse w/ inspiration. Admitted to MICU for acute b/l pulmonary embolism with evidence of right heart strain.    1. Pulmonary embolism   2. Leukocytosis     Plan:     Neuro: Awake and alert at baseline, avoid sedating medications. Continue home rivastigmine     CV: New onset afib, start metoprolol 25mg BID for rate control, already on Eliquis for PE. RV strain on CT scan, f/u echo read to eval for RV strain. Troponin trended now wnl.    Pulm: B/L Pulmonary embolisms seen on CT scan w/ potential RV strain. currently on RA, supplement O2 as needed for SpO2>90%. Transitioned yesterday from heparin gtt to Eliquis 10 milligrams every 12 hours for 7 days.    GI: tolerating PO diet, discontinue protonix    Renal: No active issues, continue to monitor and avoid nephrotoxic meds.    Endo: Glucose <180, Mag>2, K>4 for arrythmia suppression     Heme: Continue Eliquis 10 milligrams every 12 hours for 7 days. B/L lower extremity dopplers negative for DVT    ID: Leukocytosis downtrending, continue to trend markers of infection and will start on abx if indicated.     Dispo: DNR/DNI Pt is a 88 y/o M pmhx of BPH, HLD who presents to [facility] ED w/ complaints of abdominal pain, worse w/ inspiration. Admitted to MICU for acute b/l pulmonary embolism with evidence of right heart strain.    1. Pulmonary embolism   2. Leukocytosis     Plan:     Neuro: Awake and alert at baseline, avoid sedating medications. Continue home rivastigmine.     CV: New onset afib, metoprolol 25mg BID for rate control, already on Eliquis for PE. RV strain on CT scan. Troponin trended now wnl.    Pulm: B/L Pulmonary embolisms seen on CT scan w/ potential RV strain. currently on RA, supplement O2 as needed for SpO2>90%. Eliquis 10 milligrams every 12 hours for 7 days.    GI: DASH diet    Renal: No active issues, continue to monitor and avoid nephrotoxic meds.    Endo: Glucose <180, Mag>2, K>4 for arrythmia suppression     Heme: Continue Eliquis 10 milligrams every 12 hours for 7 days. B/L lower extremity dopplers negative for DVT    ID: Leukocytosis resolved.     Dispo: DNR/DNI

## 2022-10-11 NOTE — PROGRESS NOTE ADULT - PROBLEM SELECTOR PLAN 2
Afib now rate-controlled.  Continue anticoagulation   Continue Lopressor for rate control- adjust dose of Lopressor as appropriate for optimal HR control  Monitor BP

## 2022-10-11 NOTE — PROGRESS NOTE ADULT - PROBLEM SELECTOR PLAN 7
Chronic   - Pt is on home Finasteride 5 mg QD  - CT A/P: Heterogeneously enlarged prostate, causing mass effect and protrusion at the bladder base.   Outpatient follow up with urologist.

## 2022-10-11 NOTE — PROGRESS NOTE ADULT - PROBLEM SELECTOR PLAN 3
likely reactive, and now resolved.  CT shows lung opacities, but this is likely due to pulmonary infarct and not infection.   Patient was recently treated with antibiotics for PNA outpatient.   Monitor

## 2022-10-11 NOTE — PROGRESS NOTE ADULT - SUBJECTIVE AND OBJECTIVE BOX
Patient is a 87y old  Male who presents with a chief complaint of PE (11 Oct 2022 11:47)      INTERVAL HPI/OVERNIGHT EVENTS: Patient seen and examined at approximately 3:45PM. No complaints. No acute interval events. Reports some cough, but this appears to be ongoing for a few weeks. Denies any shortness of breath at rest. Reports he has some pain with deep inspiration, but this has improved significantly since pre-admission. No palpitations. No nausea, vomiting, or diarrhea.     MEDICATIONS  (STANDING):  apixaban 10 milliGRAM(s) Oral every 12 hours  chlorhexidine 2% Cloths 1 Application(s) Topical <User Schedule>  finasteride 5 milliGRAM(s) Oral daily  influenza  Vaccine (HIGH DOSE) 0.7 milliLiter(s) IntraMuscular once  lidocaine   4% Patch 1 Patch Transdermal every 24 hours  metoprolol tartrate 25 milliGRAM(s) Oral two times a day  rivastigmine patch  9.5 mG/24 Hr(s) 1 Patch Transdermal every 24 hours  simvastatin 20 milliGRAM(s) Oral at bedtime    MEDICATIONS  (PRN):  benzonatate 100 milliGRAM(s) Oral every 8 hours PRN Cough      Allergies    No Known Allergies    Intolerances        REVIEW OF SYSTEMS:  as per HPI    Vital Signs Last 24 Hrs  T(C): 37.1 (11 Oct 2022 20:26), Max: 37.3 (11 Oct 2022 02:00)  T(F): 98.8 (11 Oct 2022 20:26), Max: 99.1 (11 Oct 2022 02:00)  HR: 69 (11 Oct 2022 19:30) (55 - 80)  BP: 126/70 (11 Oct 2022 19:30) (109/72 - 145/76)  BP(mean): 90 (11 Oct 2022 19:30) (86 - 105)  RR: 30 (11 Oct 2022 19:30) (19 - 30)  SpO2: 95% (11 Oct 2022 19:30) (94% - 97%)    Parameters below as of 11 Oct 2022 19:30  Patient On (Oxygen Delivery Method): room air        PHYSICAL EXAM:  GENERAL: NAD  HEENT:  anicteric, moist mucous membranes  CHEST/LUNG:  CTA b/l, no rales, wheezes, or rhonchi  HEART: irregular  ABDOMEN:  BS+, soft, nontender, nondistended  EXTREMITIES: no edema, cyanosis, or calf tenderness  NERVOUS SYSTEM: answers questions and follows commands appropriately    LABS:                        11.1   7.87  )-----------( 264      ( 11 Oct 2022 06:05 )             34.1     CBC Full  -  ( 11 Oct 2022 06:05 )  WBC Count : 7.87 K/uL  Hemoglobin : 11.1 g/dL  Hematocrit : 34.1 %  Platelet Count - Automated : 264 K/uL  Mean Cell Volume : 90.0 fl  Mean Cell Hemoglobin : 29.3 pg  Mean Cell Hemoglobin Concentration : 32.6 gm/dL  Auto Neutrophil # : 5.01 K/uL  Auto Lymphocyte # : 1.90 K/uL  Auto Monocyte # : 0.80 K/uL  Auto Eosinophil # : 0.07 K/uL  Auto Basophil # : 0.03 K/uL  Auto Neutrophil % : 63.6 %  Auto Lymphocyte % : 24.1 %  Auto Monocyte % : 10.2 %  Auto Eosinophil % : 0.9 %  Auto Basophil % : 0.4 %    11 Oct 2022 06:05    140    |  106    |  15     ----------------------------<  110    4.4     |  25     |  0.73     Ca    9.0        11 Oct 2022 06:05  Phos  3.2       11 Oct 2022 06:05  Mg     2.2       11 Oct 2022 06:05    TPro  6.6    /  Alb  2.5    /  TBili  0.5    /  DBili  x      /  AST  136    /  ALT  222    /  AlkPhos  77     11 Oct 2022 06:05    PT/INR - ( 11 Oct 2022 17:30 )   PT: 22.6 sec;   INR: 1.92 ratio         PTT - ( 11 Oct 2022 17:30 )  PTT:37.4 sec  Urinalysis Basic - ( 10 Oct 2022 10:00 )    Color: Yellow / Appearance: Clear / S.015 / pH: x  Gluc: x / Ketone: Negative  / Bili: Negative / Urobili: Negative   Blood: x / Protein: 15 / Nitrite: Negative   Leuk Esterase: Negative / RBC: 0-2 /HPF / WBC 0-2   Sq Epi: x / Non Sq Epi: Occasional / Bacteria: x      CAPILLARY BLOOD GLUCOSE            Culture - Blood (collected 10-07-22 @ 02:00)  Source: .Blood Blood-Peripheral  Preliminary Report (10-08-22 @ 10:01):    No growth to date.    Culture - Blood (collected 10-07-22 @ 01:50)  Source: .Blood Blood-Peripheral  Preliminary Report (10-08-22 @ 10:01):    No growth to date.        RADIOLOGY & ADDITIONAL TESTS:    Personally reviewed.     Consultant(s) Notes Reviewed:  [x] YES  [ ] NO

## 2022-10-11 NOTE — PROGRESS NOTE ADULT - SUBJECTIVE AND OBJECTIVE BOX
Patient is a 87y old  Male who presents with a chief complaint of PE     Interval events: No interval events.  Patient seen and examined at bedside. Patient in no acute distress. Denies CP, SOB, abd pain or any other complaints.     Review of Systems:  Constitutional: no fever, chills, fatigue  Neuro: no headache, numbness, weakness  Resp: no cough, wheezing, shortness of breath  CVS: no CP, no palpitations, leg swelling  GI: no abdominal pain, nausea, vomiting, diarrhea   : no dysuria, frequency, incontinence    LABS:                        10.7   8.02  )-----------( 239      ( 10 Oct 2022 06:26 )             32.1     10-10    142  |  109<H>  |  18  ----------------------------<  104<H>  3.8   |  25  |  0.78    Ca    8.7      10 Oct 2022 06:26  Phos  3.3     10-10  Mg     2.2     10-10    TPro  6.3  /  Alb  2.3<L>  /  TBili  0.5  /  DBili  x   /  AST  136<H>  /  ALT  192<H>  /  AlkPhos  71  10-10      Urinalysis Basic - ( 10 Oct 2022 10:00 )    Color: Yellow / Appearance: Clear / S.015 / pH: x  Gluc: x / Ketone: Negative  / Bili: Negative / Urobili: Negative   Blood: x / Protein: 15 / Nitrite: Negative   Leuk Esterase: Negative / RBC: 0-2 /HPF / WBC 0-2   Sq Epi: x / Non Sq Epi: Occasional / Bacteria: x        VITAL SIGNS:  T(C): 36.8 (10-10-22 @ 12:41), Max: 37.3 (10-09-22 @ 15:55)  HR: 65 (10-10-22 @ 12:00) (56 - 104)  BP: 121/66 (10-10-22 @ 12:00) (107/57 - 153/74)  RR: 24 (10-10-22 @ 12:00) (20 - 32)  SpO2: 96% (10-10-22 @ 12:00) (90% - 97%)    Physical Exam:     Gen: elderly male, NAD  Neuro: awake and alert, appropriately responsive to questions  HEENT: NCAT, EOMI  CV: irregularly irregular rhythm, tachycardic, no murmurs rubs or gallops  Pulm: CTAB, no wheezes rales or rhonchi  GI: soft NTND, +BS  Ext: no edema b/l LE, nontender  Skin: warm and well perfused    Meds:  MEDICATIONS  (STANDING):  apixaban 10 milliGRAM(s) Oral every 12 hours  chlorhexidine 2% Cloths 1 Application(s) Topical <User Schedule>  finasteride 5 milliGRAM(s) Oral daily  influenza  Vaccine (HIGH DOSE) 0.7 milliLiter(s) IntraMuscular once  lidocaine   4% Patch 1 Patch Transdermal every 24 hours  metoprolol tartrate 25 milliGRAM(s) Oral two times a day  metoprolol tartrate Injectable 5 milliGRAM(s) IV Push once  pantoprazole  Injectable 40 milliGRAM(s) IV Push daily  rivastigmine patch  9.5 mG/24 Hr(s) 1 Patch Transdermal every 24 hours  simvastatin 20 milliGRAM(s) Oral at bedtime    MEDICATIONS  (PRN):                 Radiology:   CT ABDOMEN AND PELVIS IC                        ACC: [id number] [facility]  [facility]                          PROCEDURE DATE:  10/07/2022          INTERPRETATION:  CLINICAL INFORMATION: Dyspnea on exertion. Abdominal   pain.    COMPARISON: None.    CONTRAST/COMPLICATIONS:  IV Contrast: Omnipaque 350 (accession [id number]), IV contrast documented   in associated exam (accession [id number])  90 cc administered (accession   [id number]), 0 cc administered (accession [id number])  Oral Contrast: NONE  Complications: None reported at time of study completion    PROCEDURE:  CT of the Chest, Abdomen and Pelvis was performed.  Sagittal and coronal reformats were performed.    FINDINGS:  CHEST:  LUNGS AND LARGE AIRWAYS: Patent central airways. Somewhat consolidative   opacities in bilateral lower lobes, raising concern for developing   pneumonia. Pulmonary infarcts considered in the differential. Few   bilateral sub-pleural based nodules, largest measuring up to 5mm in left   lower lobe on image 54 series 2. 3 mm subpleural based nodule in right   middle lobe on image 54 series 2.  PLEURA: Trace left pleural effusion.  VESSELS: There are extensive filling defects identified within bilateral   lobar pulmonary arteries extending to bilateral lower lobe segmental to   segmental branches, compatible with pulmonary emboli. Additional small   filling defect identified within the origin of the right middle lobe,   right upper lobe and lingular branch. Atherosclerotic changes.  HEART: Mild cardiomegaly with asymmetric enlargement the right cardiac   chamber. Recommend further evaluation to exclude right heart strain in   appropriate clinical setting. No pericardial effusion.  MEDIASTINUM AND ROLY: No lymphadenopathy.  CHEST WALL AND LOWER NECK: Within normal limits.    ABDOMEN AND PELVIS:  LIVER: Within normal limits.  BILE DUCTS: Normal caliber.  GALLBLADDER: Within normal limits.  SPLEEN: Within normal limits.  PANCREAS: Within normal limits.  ADRENALS: Within normal limits.  KIDNEYS/URETERS: No hydronephrosis. Bilateral renal cysts as well as too   small to characterize hypodensities.    BLADDER: Mild wall thickening, difficult to assess secondary to   inadequate distention.  REPRODUCTIVE ORGANS: Heterogeneously enlarged prostate, cause mass effect   and protrusion at the bladder base. Correlate with PSA and further   nonemergent workup to exclude underlying prostatic malignancy.    BOWEL: Small hiatal hernia. No bowel obstruction. Normal appendix.  PERITONEUM: No ascites.  VESSELS: Atherosclerotic changes.  RETROPERITONEUM/LYMPH NODES: No lymphadenopathy.  ABDOMINAL WALL: A small fat containing umbilical hernia is noted. Right   groin hernia repair postoperative changes.    BONES: Multilevel degenerative changes of the spine.    IMPRESSION:    Findings compatible with bilateral pulmonary emboli with suggestion of   right heart strain as detailed above.    Somewhat consolidative opacities in bilateral lower lobes, raising   concern for developing pneumonia. Pulmonary infarcts considered in the   differential. Pulmonary imaging follow-up in 6 weeks advised demonstrate   resolution.    Trace left pleural effusion.    Few bilateral sub-pleural based nodules, largest measuring up to 5mm in   left lower lobe. Pulmonary imaging follow-up in one year is advised if   the patient is increased risk for neoplasm.    Heterogeneously enlarged prostate, cause mass effect and protrusion at   the bladder base. Correlate with PSA and further nonemergent workup to   exclude underlying prostatic malignancy.    Additional findings as mentioned above.    These critical results were discussed via telephone at 10/7/2022 1:03 AM   by Dr. Serrano of radiology with Dr. Lane, read-back was followed.    --- End of Report ---      U/S Doppler negative for DVT b/l      Tubes/Lines:  Peripheral UE IV    GLOBAL ISSUE/BEST PRACTICE:  Analgesia: N  Sedation: N  HOB elevation: yes  Stress ulcer prophylaxis: N  VTE prophylaxis: Y  Glycemic control: N  Nutrition: Y    CODE STATUS: DNR/DNI        Patient is a 87y old  Male who presents with a chief complaint of PE     Interval events: No interval events.  Patient seen and examined at bedside. Patient in no acute distress. Denies CP, SOB, abd pain or any other complaints.     Patient had no overnight events. He is doing well and only complains of a cough that has been bothering him for the past few weeks.    Review of Systems:  Constitutional: no fever, chills, fatigue  Neuro: no headache, numbness, weakness  Resp: no cough, wheezing, shortness of breath  CVS: no CP, no palpitations, leg swelling  GI: no abdominal pain, nausea, vomiting, diarrhea   : no dysuria, frequency, incontinence    LABS:                        10.7   8.02  )-----------( 239      ( 10 Oct 2022 06:26 )             32.1     10-10    142  |  109<H>  |  18  ----------------------------<  104<H>  3.8   |  25  |  0.78    Ca    8.7      10 Oct 2022 06:26  Phos  3.3     10-10  Mg     2.2     10-10    TPro  6.3  /  Alb  2.3<L>  /  TBili  0.5  /  DBili  x   /  AST  136<H>  /  ALT  192<H>  /  AlkPhos  71  10-10      Urinalysis Basic - ( 10 Oct 2022 10:00 )    Color: Yellow / Appearance: Clear / S.015 / pH: x  Gluc: x / Ketone: Negative  / Bili: Negative / Urobili: Negative   Blood: x / Protein: 15 / Nitrite: Negative   Leuk Esterase: Negative / RBC: 0-2 /HPF / WBC 0-2   Sq Epi: x / Non Sq Epi: Occasional / Bacteria: x        VITAL SIGNS:  T(C): 36.8 (10-10-22 @ 12:41), Max: 37.3 (10-09-22 @ 15:55)  HR: 65 (10-10-22 @ 12:00) (56 - 104)  BP: 121/66 (10-10-22 @ 12:00) (107/57 - 153/74)  RR: 24 (10-10-22 @ 12:00) (20 - 32)  SpO2: 96% (10-10-22 @ 12:00) (90% - 97%)    Physical Exam:     Gen: elderly male, NAD  Neuro: awake and alert, appropriately responsive to questions  HEENT: NCAT, EOMI  CV: regular rate and rhythm. no murmurs rubs or gallops  Pulm: CTAB, no wheezes rales or rhonchi  GI: soft NTND, +BS  Ext: no edema b/l LE, nontender  Skin: warm and well perfused    Meds:  MEDICATIONS  (STANDING):  apixaban 10 milliGRAM(s) Oral every 12 hours  chlorhexidine 2% Cloths 1 Application(s) Topical <User Schedule>  finasteride 5 milliGRAM(s) Oral daily  influenza  Vaccine (HIGH DOSE) 0.7 milliLiter(s) IntraMuscular once  lidocaine   4% Patch 1 Patch Transdermal every 24 hours  metoprolol tartrate 25 milliGRAM(s) Oral two times a day  metoprolol tartrate Injectable 5 milliGRAM(s) IV Push once  pantoprazole  Injectable 40 milliGRAM(s) IV Push daily  rivastigmine patch  9.5 mG/24 Hr(s) 1 Patch Transdermal every 24 hours  simvastatin 20 milliGRAM(s) Oral at bedtime    MEDICATIONS  (PRN):                 Radiology:   CT ABDOMEN AND PELVIS IC                        ACC: [id number] EXAM:  CT ANGIO CHEST PULM [facility]                          PROCEDURE DATE:  10/07/2022          INTERPRETATION:  CLINICAL INFORMATION: Dyspnea on exertion. Abdominal   pain.    COMPARISON: None.    CONTRAST/COMPLICATIONS:  IV Contrast: Omnipaque 350 (accession [id number]), IV contrast documented   in associated exam (accession [id number])  90 cc administered (accession   [id number]), 0 cc administered (accession [id number])  Oral Contrast: NONE  Complications: None reported at time of study completion    PROCEDURE:  CT of the Chest, Abdomen and Pelvis was performed.  Sagittal and coronal reformats were performed.    FINDINGS:  CHEST:  LUNGS AND LARGE AIRWAYS: Patent central airways. Somewhat consolidative   opacities in bilateral lower lobes, raising concern for developing   pneumonia. Pulmonary infarcts considered in the differential. Few   bilateral sub-pleural based nodules, largest measuring up to 5mm in left   lower lobe on image 54 series 2. 3 mm subpleural based nodule in right   middle lobe on image 54 series 2.  PLEURA: Trace left pleural effusion.  VESSELS: There are extensive filling defects identified within bilateral   lobar pulmonary arteries extending to bilateral lower lobe segmental to   segmental branches, compatible with pulmonary emboli. Additional small   filling defect identified within the origin of the right middle lobe,   right upper lobe and lingular branch. Atherosclerotic changes.  HEART: Mild cardiomegaly with asymmetric enlargement the right cardiac   chamber. Recommend further evaluation to exclude right heart strain in   appropriate clinical setting. No pericardial effusion.  MEDIASTINUM AND ROLY: No lymphadenopathy.  CHEST WALL AND LOWER NECK: Within normal limits.    ABDOMEN AND PELVIS:  LIVER: Within normal limits.  BILE DUCTS: Normal caliber.  GALLBLADDER: Within normal limits.  SPLEEN: Within normal limits.  PANCREAS: Within normal limits.  ADRENALS: Within normal limits.  KIDNEYS/URETERS: No hydronephrosis. Bilateral renal cysts as well as too   small to characterize hypodensities.    BLADDER: Mild wall thickening, difficult to assess secondary to   inadequate distention.  REPRODUCTIVE ORGANS: Heterogeneously enlarged prostate, cause mass effect   and protrusion at the bladder base. Correlate with PSA and further   nonemergent workup to exclude underlying prostatic malignancy.    BOWEL: Small hiatal hernia. No bowel obstruction. Normal appendix.  PERITONEUM: No ascites.  VESSELS: Atherosclerotic changes.  RETROPERITONEUM/LYMPH NODES: No lymphadenopathy.  ABDOMINAL WALL: A small fat containing umbilical hernia is noted. Right   groin hernia repair postoperative changes.    BONES: Multilevel degenerative changes of the spine.    IMPRESSION:    Findings compatible with bilateral pulmonary emboli with suggestion of   right heart strain as detailed above.    Somewhat consolidative opacities in bilateral lower lobes, raising   concern for developing pneumonia. Pulmonary infarcts considered in the   differential. Pulmonary imaging follow-up in 6 weeks advised demonstrate   resolution.    Trace left pleural effusion.    Few bilateral sub-pleural based nodules, largest measuring up to 5mm in   left lower lobe. Pulmonary imaging follow-up in one year is advised if   the patient is increased risk for neoplasm.    Heterogeneously enlarged prostate, cause mass effect and protrusion at   the bladder base. Correlate with PSA and further nonemergent workup to   exclude underlying prostatic malignancy.    Additional findings as mentioned above.    These critical results were discussed via telephone at 10/7/2022 1:03 AM   by Dr. Serrano of radiology with Dr. Lane, read-back was followed.    --- End of Report ---      U/S Doppler negative for DVT b/l      Tubes/Lines:  Peripheral UE IV    GLOBAL ISSUE/BEST PRACTICE:  Analgesia: N  Sedation: N  HOB elevation: yes  Stress ulcer prophylaxis: N  VTE prophylaxis: Y  Glycemic control: N  Nutrition: Y    CODE STATUS: DNR/DNI

## 2022-10-12 VITALS
SYSTOLIC BLOOD PRESSURE: 126 MMHG | OXYGEN SATURATION: 94 % | RESPIRATION RATE: 20 BRPM | DIASTOLIC BLOOD PRESSURE: 62 MMHG | HEART RATE: 78 BPM

## 2022-10-12 LAB
ALBUMIN SERPL ELPH-MCNC: 2.5 G/DL — LOW (ref 3.3–5)
ALP SERPL-CCNC: 77 U/L — SIGNIFICANT CHANGE UP (ref 40–120)
ALT FLD-CCNC: 219 U/L — HIGH (ref 12–78)
ANION GAP SERPL CALC-SCNC: 6 MMOL/L — SIGNIFICANT CHANGE UP (ref 5–17)
AST SERPL-CCNC: 120 U/L — HIGH (ref 15–37)
BASOPHILS # BLD AUTO: 0.03 K/UL — SIGNIFICANT CHANGE UP (ref 0–0.2)
BASOPHILS NFR BLD AUTO: 0.4 % — SIGNIFICANT CHANGE UP (ref 0–2)
BILIRUB SERPL-MCNC: 0.6 MG/DL — SIGNIFICANT CHANGE UP (ref 0.2–1.2)
BUN SERPL-MCNC: 16 MG/DL — SIGNIFICANT CHANGE UP (ref 7–23)
CALCIUM SERPL-MCNC: 9.1 MG/DL — SIGNIFICANT CHANGE UP (ref 8.5–10.1)
CHLORIDE SERPL-SCNC: 105 MMOL/L — SIGNIFICANT CHANGE UP (ref 96–108)
CO2 SERPL-SCNC: 29 MMOL/L — SIGNIFICANT CHANGE UP (ref 22–31)
CREAT SERPL-MCNC: 0.87 MG/DL — SIGNIFICANT CHANGE UP (ref 0.5–1.3)
CULTURE RESULTS: SIGNIFICANT CHANGE UP
EGFR: 84 ML/MIN/1.73M2 — SIGNIFICANT CHANGE UP
EOSINOPHIL # BLD AUTO: 0.07 K/UL — SIGNIFICANT CHANGE UP (ref 0–0.5)
EOSINOPHIL NFR BLD AUTO: 0.8 % — SIGNIFICANT CHANGE UP (ref 0–6)
GLUCOSE SERPL-MCNC: 110 MG/DL — HIGH (ref 70–99)
HCT VFR BLD CALC: 32.5 % — LOW (ref 39–50)
HGB BLD-MCNC: 10.6 G/DL — LOW (ref 13–17)
IMM GRANULOCYTES NFR BLD AUTO: 1 % — HIGH (ref 0–0.9)
LYMPHOCYTES # BLD AUTO: 2.09 K/UL — SIGNIFICANT CHANGE UP (ref 1–3.3)
LYMPHOCYTES # BLD AUTO: 25 % — SIGNIFICANT CHANGE UP (ref 13–44)
MAGNESIUM SERPL-MCNC: 2.3 MG/DL — SIGNIFICANT CHANGE UP (ref 1.6–2.6)
MCHC RBC-ENTMCNC: 29.9 PG — SIGNIFICANT CHANGE UP (ref 27–34)
MCHC RBC-ENTMCNC: 32.6 GM/DL — SIGNIFICANT CHANGE UP (ref 32–36)
MCV RBC AUTO: 91.5 FL — SIGNIFICANT CHANGE UP (ref 80–100)
MONOCYTES # BLD AUTO: 0.96 K/UL — HIGH (ref 0–0.9)
MONOCYTES NFR BLD AUTO: 11.5 % — SIGNIFICANT CHANGE UP (ref 2–14)
NEUTROPHILS # BLD AUTO: 5.13 K/UL — SIGNIFICANT CHANGE UP (ref 1.8–7.4)
NEUTROPHILS NFR BLD AUTO: 61.3 % — SIGNIFICANT CHANGE UP (ref 43–77)
NRBC # BLD: 0 /100 WBCS — SIGNIFICANT CHANGE UP (ref 0–0)
PHOSPHATE SERPL-MCNC: 3.2 MG/DL — SIGNIFICANT CHANGE UP (ref 2.5–4.5)
PLATELET # BLD AUTO: 276 K/UL — SIGNIFICANT CHANGE UP (ref 150–400)
POTASSIUM SERPL-MCNC: 4.5 MMOL/L — SIGNIFICANT CHANGE UP (ref 3.5–5.3)
POTASSIUM SERPL-SCNC: 4.5 MMOL/L — SIGNIFICANT CHANGE UP (ref 3.5–5.3)
PROT SERPL-MCNC: 6.6 G/DL — SIGNIFICANT CHANGE UP (ref 6–8.3)
RBC # BLD: 3.55 M/UL — LOW (ref 4.2–5.8)
RBC # FLD: 12.8 % — SIGNIFICANT CHANGE UP (ref 10.3–14.5)
SODIUM SERPL-SCNC: 140 MMOL/L — SIGNIFICANT CHANGE UP (ref 135–145)
SPECIMEN SOURCE: SIGNIFICANT CHANGE UP
WBC # BLD: 8.36 K/UL — SIGNIFICANT CHANGE UP (ref 3.8–10.5)
WBC # FLD AUTO: 8.36 K/UL — SIGNIFICANT CHANGE UP (ref 3.8–10.5)

## 2022-10-12 PROCEDURE — 99232 SBSQ HOSP IP/OBS MODERATE 35: CPT | Mod: GC

## 2022-10-12 PROCEDURE — 99233 SBSQ HOSP IP/OBS HIGH 50: CPT

## 2022-10-12 PROCEDURE — 93010 ELECTROCARDIOGRAM REPORT: CPT

## 2022-10-12 RX ORDER — METOPROLOL TARTRATE 50 MG
1 TABLET ORAL
Qty: 0 | Refills: 0 | DISCHARGE
Start: 2022-10-12

## 2022-10-12 RX ORDER — METOPROLOL TARTRATE 50 MG
1 TABLET ORAL
Qty: 60 | Refills: 0
Start: 2022-10-12 | End: 2022-11-10

## 2022-10-12 RX ADMIN — CHLORHEXIDINE GLUCONATE 1 APPLICATION(S): 213 SOLUTION TOPICAL at 05:14

## 2022-10-12 RX ADMIN — LIDOCAINE 1 PATCH: 4 CREAM TOPICAL at 12:05

## 2022-10-12 RX ADMIN — Medication 25 MILLIGRAM(S): at 05:14

## 2022-10-12 RX ADMIN — FINASTERIDE 5 MILLIGRAM(S): 5 TABLET, FILM COATED ORAL at 12:05

## 2022-10-12 RX ADMIN — APIXABAN 10 MILLIGRAM(S): 2.5 TABLET, FILM COATED ORAL at 10:39

## 2022-10-12 RX ADMIN — RIVASTIGMINE 1 PATCH: 4.6 PATCH, EXTENDED RELEASE TRANSDERMAL at 14:40

## 2022-10-12 RX ADMIN — LIDOCAINE 1 PATCH: 4 CREAM TOPICAL at 00:20

## 2022-10-12 RX ADMIN — Medication 100 MILLIGRAM(S): at 10:39

## 2022-10-12 NOTE — PROGRESS NOTE ADULT - PROBLEM SELECTOR PLAN 1
Bilateral acute PE with right heart strain and suspected pulmonary infarct causing pleuritic CP  Continue anticoagulation - switched to Eliquis  LE dopplers negative for DVT.  Maintaining adequate saturation on RA.  Up for downgrade to floor.   If remains stable, can likely be discharged home tomorrow.
Bilateral acute PE with right heart strain and suspected pulmonary infarct causing pleuritic CP  Continue anticoagulation - switched to Eliquis  Monitor per ICU
Bilateral acute PE with right heart strain and suspected pulmonary infarct causing pleuritic CP  Continue Eliquis  Monitor per ICU
Bilateral acute PE with right heart strain and suspected pulmonary infarct causing pleuritic CP  Continue anticoagulation - switched to Eliquis  Monitor per ICU
Bilateral acute PE with right heart strain and suspected pulmonary infarct causing pleuritic CP  Continue anticoagulation - Eliquis  LE dopplers negative for DVT.  Mostly maintaining adequate saturations on RA, but has had some sats in the 80s intermittently.  Up for downgrade to floor.   Recommend checking sats/HR with ambulation today.
WDL

## 2022-10-12 NOTE — PROGRESS NOTE ADULT - NUTRITIONAL ASSESSMENT
This patient has been assessed with a concern for Malnutrition and has been determined to have a diagnosis/diagnoses of Severe protein-calorie malnutrition.    This patient is being managed with:   Diet DASH/TLC-  Sodium & Cholesterol Restricted  Entered: Oct  7 2022  3:33PM    

## 2022-10-12 NOTE — PROGRESS NOTE ADULT - PROBLEM SELECTOR PROBLEM 2
Rapid atrial fibrillation

## 2022-10-12 NOTE — PROGRESS NOTE ADULT - PROBLEM SELECTOR PROBLEM 7
BPH (benign prostatic hyperplasia)
BPH (benign prostatic hyperplasia)
Need for prophylactic measure

## 2022-10-12 NOTE — PROGRESS NOTE ADULT - PROBLEM SELECTOR PROBLEM 6
BPH (benign prostatic hyperplasia)
BPH (benign prostatic hyperplasia)
HLD (hyperlipidemia)
HLD (hyperlipidemia)
BPH (benign prostatic hyperplasia)

## 2022-10-12 NOTE — PHARMACOTHERAPY INTERVENTION NOTE - COMMENTS
Meds to Beds   Prescriptions filled at [facility].   Prescriptions: Eliquis Starter Pack   Brought to bedside and counseled.     [facility] name: Srini Rivera, PharmD  Person(s) counseled: patient   Counseling materials provided/counseling aids used: [employer] patient education pamphlet    Time spent Counselin mins   Counseling provided according to ASHP guidelines including side effects  Notes:

## 2022-10-12 NOTE — PROGRESS NOTE ADULT - TIME BILLING
Reviewing medical record including consultant recommendations, labs, vitals, medications, orders, imaging, and discussion of plan of care with patient, family, consultants, nursing.
direct care of a critically ill patient including but not limited to reviewing chart, medications ,laboratory data, imaging reports, discussion of plan of care with consultants on the case, coordination of care with multidisciplinary team involved in the case and discussion of plan with ICU team.    Discussed with ICU attending  Patient at risk for delirium with underlying dementia.  Close monitoring - stii requiring ICU care
Reviewing medical record including consultant recommendations, labs, vitals, medications, orders, imaging, and discussion of plan of care with patient, family, consultants, nursing.
chart and data review, clinical assessment, coordination of care
chart and data review, clinical assessment, coordination of care
see above

## 2022-10-12 NOTE — PROGRESS NOTE ADULT - TIME-BASED BILLING (NON-CRITICAL CARE)
Time-based billing (NON-critical care)
[Awake] : awake
[Alert] : alert
Time-based billing (NON-critical care)
[Soft] : soft
[Oriented x3] : oriented to person, place, and time
Time-based billing (NON-critical care)
[Normal] : uterus
[No Bleeding] : there was no active vaginal bleeding
[Uterine Adnexae] : were not tender and not enlarged
[Acute Distress] : no acute distress
[Mass] : no breast mass
[Nipple Discharge] : no nipple discharge
[Axillary LAD] : no axillary lymphadenopathy
[Tender] : non tender
Time-based billing (NON-critical care)

## 2022-10-12 NOTE — PROGRESS NOTE ADULT - PROVIDER SPECIALTY LIST ADULT
Critical Care
Hospitalist
Internal Medicine
Critical Care
Internal Medicine
Hospitalist
Hospitalist

## 2022-10-12 NOTE — PROGRESS NOTE ADULT - SUBJECTIVE AND OBJECTIVE BOX
Patient is a 87y old  Male who presents with a chief complaint of PE (11 Oct 2022 11:47)      INTERVAL HPI/OVERNIGHT EVENTS: Patient seen and examined. Lying in bed comfortably. No acute interval events. Denies any shortness of breath at rest. Reports he has some pain with deep inspiration still. No palpitations. No nausea, vomiting, or diarrhea.     MEDICATIONS  (STANDING):  apixaban 10 milliGRAM(s) Oral every 12 hours  chlorhexidine 2% Cloths 1 Application(s) Topical <User Schedule>  finasteride 5 milliGRAM(s) Oral daily  influenza  Vaccine (HIGH DOSE) 0.7 milliLiter(s) IntraMuscular once  lidocaine   4% Patch 1 Patch Transdermal every 24 hours  metoprolol tartrate 25 milliGRAM(s) Oral two times a day  rivastigmine patch  9.5 mG/24 Hr(s) 1 Patch Transdermal every 24 hours  simvastatin 20 milliGRAM(s) Oral at bedtime    MEDICATIONS  (PRN):  benzonatate 100 milliGRAM(s) Oral every 8 hours PRN Cough        Allergies    No Known Allergies    Intolerances        REVIEW OF SYSTEMS:  as per HPI    Vital Signs Last 24 Hrs  T(C): 37.1 (12 Oct 2022 08:06), Max: 37.2 (12 Oct 2022 00:38)  T(F): 98.8 (12 Oct 2022 08:06), Max: 99 (12 Oct 2022 00:38)  HR: 59 (12 Oct 2022 08:00) (59 - 86)  BP: 116/70 (12 Oct 2022 08:00) (108/58 - 140/63)  BP(mean): 88 (12 Oct 2022 08:00) (77 - 103)  RR: 22 (12 Oct 2022 08:00) (15 - 46)  SpO2: 93% (12 Oct 2022 08:00) (81% - 96%)    Parameters below as of 12 Oct 2022 07:00  Patient On (Oxygen Delivery Method): room air      PHYSICAL EXAM:  GENERAL: NAD  HEENT:  anicteric, moist mucous membranes  CHEST/LUNG:  CTA b/l, no rales, wheezes, or rhonchi  HEART: irregular  ABDOMEN:  BS+, soft, nontender, nondistended  EXTREMITIES: no edema, cyanosis, or calf tenderness  NERVOUS SYSTEM: answers questions and follows commands appropriately    LABS:                        10.6   8.36  )-----------( 276      ( 12 Oct 2022 06:11 )             32.5   10    140  |  105  |  16  ----------------------------<  110<H>  4.5   |  29  |  0.87    Ca    9.1      12 Oct 2022 06:11  Phos  3.2     10-12  Mg     2.3     10-12    TPro  6.6  /  Alb  2.5<L>  /  TBili  0.6  /  DBili  x   /  AST  120<H>  /  ALT  219<H>  /  AlkPhos  77  10-12      Urinalysis Basic - ( 10 Oct 2022 10:00 )    Color: Yellow / Appearance: Clear / S.015 / pH: x  Gluc: x / Ketone: Negative  / Bili: Negative / Urobili: Negative   Blood: x / Protein: 15 / Nitrite: Negative   Leuk Esterase: Negative / RBC: 0-2 /HPF / WBC 0-2   Sq Epi: x / Non Sq Epi: Occasional / Bacteria: x      CAPILLARY BLOOD GLUCOSE            Culture - Blood (collected 10-07-22 @ 02:00)  Source: .Blood Blood-Peripheral  Preliminary Report (10-08-22 @ 10:01):    No growth to date.    Culture - Blood (collected 10-07-22 @ 01:50)  Source: .Blood Blood-Peripheral  Preliminary Report (10-08-22 @ 10:01):    No growth to date.        RADIOLOGY & ADDITIONAL TESTS:    Personally reviewed.     Consultant(s) Notes Reviewed:  [x] YES  [ ] NO

## 2022-10-12 NOTE — PROGRESS NOTE ADULT - ASSESSMENT
Pt is a 86 y/o M pmhx of BPH, HLD who presents to [facility] ED w/ complaints of abdominal pain, worse w/ inspiration. Admitted to MICU for acute b/l pulmonary embolism with evidence of right heart strain.    1. Pulmonary embolism   2. Leukocytosis     Plan:     Neuro: Awake and alert at baseline, avoid sedating medications. Continue home rivastigmine.     CV: New onset afib, metoprolol 25mg BID for rate control, already on Eliquis for PE. RV strain on CT scan. Troponin trended now wnl.    Pulm: B/L Pulmonary embolisms seen on CT scan w/ potential RV strain. currently on RA, supplement O2 as needed for SpO2>90%. Eliquis 10 milligrams every 12 hours for 7 days.    GI: DASH diet    Renal: No active issues, continue to monitor and avoid nephrotoxic meds.    Endo: Glucose <180, Mag>2, K>4 for arrythmia suppression     Heme: Continue Eliquis 10 milligrams every 12 hours for 7 days. B/L lower extremity dopplers negative for DVT    ID: Leukocytosis resolved.     Dispo: DNR/DNI Pt is a 88 y/o M pmhx of BPH, HLD who presents to [facility] ED w/ complaints of abdominal pain, worse w/ inspiration. Admitted to MICU for acute b/l pulmonary embolism with evidence of right heart strain.    1. Pulmonary embolism   2. Leukocytosis     Plan:     Neuro: Awake and alert at baseline, avoid sedating medications. Continue home rivastigmine.     CV: New onset afib, metoprolol 25mg BID for rate control, already on Eliquis for PE. RV strain on CT scan.    Pulm: B/L Pulmonary embolisms seen on CT scan w/ potential RV strain. currently on RA, supplement O2 as needed for SpO2>90%. Eliquis 10 milligrams every 12 hours for 7 days.    GI: DASH diet     Renal: No active issues    Endo: Glucose <180, Mag>2, K>4 for arrythmia suppression     Heme: Continue Eliquis 10 milligrams every 12 hours for 7 days currently on day 6. B/L lower extremity dopplers negative for DVT    ID: Leukocytosis resolved.     Dispo: DNR/DNI

## 2022-10-12 NOTE — PROGRESS NOTE ADULT - SUBJECTIVE AND OBJECTIVE BOX
Patient is a 87y old  Male who presents with a chief complaint of PE     Interval events: No interval events.  Patient seen and examined at bedside. Patient in no acute distress. Denies CP, SOB, abd pain or any other complaints.     Patient had no overnight events. He is doing well and only complains of a cough that has been bothering him for the past few weeks.    Review of Systems:  Constitutional: no fever, chills, fatigue  Neuro: no headache, numbness, weakness  Resp: no cough, wheezing, shortness of breath  CVS: no CP, no palpitations, leg swelling  GI: no abdominal pain, nausea, vomiting, diarrhea   : no dysuria, frequency, incontinence    LABS:                        10.7   8.02  )-----------( 239      ( 10 Oct 2022 06:26 )             32.1     10-10    142  |  109<H>  |  18  ----------------------------<  104<H>  3.8   |  25  |  0.78    Ca    8.7      10 Oct 2022 06:26  Phos  3.3     10-10  Mg     2.2     10-10    TPro  6.3  /  Alb  2.3<L>  /  TBili  0.5  /  DBili  x   /  AST  136<H>  /  ALT  192<H>  /  AlkPhos  71  10-10      Urinalysis Basic - ( 10 Oct 2022 10:00 )    Color: Yellow / Appearance: Clear / S.015 / pH: x  Gluc: x / Ketone: Negative  / Bili: Negative / Urobili: Negative   Blood: x / Protein: 15 / Nitrite: Negative   Leuk Esterase: Negative / RBC: 0-2 /HPF / WBC 0-2   Sq Epi: x / Non Sq Epi: Occasional / Bacteria: x        VITAL SIGNS:  T(C): 36.8 (10-10-22 @ 12:41), Max: 37.3 (10-09-22 @ 15:55)  HR: 65 (10-10-22 @ 12:00) (56 - 104)  BP: 121/66 (10-10-22 @ 12:00) (107/57 - 153/74)  RR: 24 (10-10-22 @ 12:00) (20 - 32)  SpO2: 96% (10-10-22 @ 12:00) (90% - 97%)    Physical Exam:     Gen: elderly male, NAD  Neuro: awake and alert, appropriately responsive to questions  HEENT: NCAT, EOMI  CV: regular rate and rhythm. no murmurs rubs or gallops  Pulm: CTAB, no wheezes rales or rhonchi  GI: soft NTND, +BS  Ext: no edema b/l LE, nontender  Skin: warm and well perfused    Meds:  MEDICATIONS  (STANDING):  apixaban 10 milliGRAM(s) Oral every 12 hours  chlorhexidine 2% Cloths 1 Application(s) Topical <User Schedule>  finasteride 5 milliGRAM(s) Oral daily  influenza  Vaccine (HIGH DOSE) 0.7 milliLiter(s) IntraMuscular once  lidocaine   4% Patch 1 Patch Transdermal every 24 hours  metoprolol tartrate 25 milliGRAM(s) Oral two times a day  metoprolol tartrate Injectable 5 milliGRAM(s) IV Push once  pantoprazole  Injectable 40 milliGRAM(s) IV Push daily  rivastigmine patch  9.5 mG/24 Hr(s) 1 Patch Transdermal every 24 hours  simvastatin 20 milliGRAM(s) Oral at bedtime    MEDICATIONS  (PRN):                 Radiology:   CT ABDOMEN AND PELVIS IC                        ACC: [id number] EXAM:  CT ANGIO CHEST PULM [facility]                          PROCEDURE DATE:  10/07/2022          INTERPRETATION:  CLINICAL INFORMATION: Dyspnea on exertion. Abdominal   pain.    COMPARISON: None.    CONTRAST/COMPLICATIONS:  IV Contrast: Omnipaque 350 (accession [id number]), IV contrast documented   in associated exam (accession [id number])  90 cc administered (accession   [id number]), 0 cc administered (accession [id number])  Oral Contrast: NONE  Complications: None reported at time of study completion    PROCEDURE:  CT of the Chest, Abdomen and Pelvis was performed.  Sagittal and coronal reformats were performed.    FINDINGS:  CHEST:  LUNGS AND LARGE AIRWAYS: Patent central airways. Somewhat consolidative   opacities in bilateral lower lobes, raising concern for developing   pneumonia. Pulmonary infarcts considered in the differential. Few   bilateral sub-pleural based nodules, largest measuring up to 5mm in left   lower lobe on image 54 series 2. 3 mm subpleural based nodule in right   middle lobe on image 54 series 2.  PLEURA: Trace left pleural effusion.  VESSELS: There are extensive filling defects identified within bilateral   lobar pulmonary arteries extending to bilateral lower lobe segmental to   segmental branches, compatible with pulmonary emboli. Additional small   filling defect identified within the origin of the right middle lobe,   right upper lobe and lingular branch. Atherosclerotic changes.  HEART: Mild cardiomegaly with asymmetric enlargement the right cardiac   chamber. Recommend further evaluation to exclude right heart strain in   appropriate clinical setting. No pericardial effusion.  MEDIASTINUM AND ROLY: No lymphadenopathy.  CHEST WALL AND LOWER NECK: Within normal limits.    ABDOMEN AND PELVIS:  LIVER: Within normal limits.  BILE DUCTS: Normal caliber.  GALLBLADDER: Within normal limits.  SPLEEN: Within normal limits.  PANCREAS: Within normal limits.  ADRENALS: Within normal limits.  KIDNEYS/URETERS: No hydronephrosis. Bilateral renal cysts as well as too   small to characterize hypodensities.    BLADDER: Mild wall thickening, difficult to assess secondary to   inadequate distention.  REPRODUCTIVE ORGANS: Heterogeneously enlarged prostate, cause mass effect   and protrusion at the bladder base. Correlate with PSA and further   nonemergent workup to exclude underlying prostatic malignancy.    BOWEL: Small hiatal hernia. No bowel obstruction. Normal appendix.  PERITONEUM: No ascites.  VESSELS: Atherosclerotic changes.  RETROPERITONEUM/LYMPH NODES: No lymphadenopathy.  ABDOMINAL WALL: A small fat containing umbilical hernia is noted. Right   groin hernia repair postoperative changes.    BONES: Multilevel degenerative changes of the spine.    IMPRESSION:    Findings compatible with bilateral pulmonary emboli with suggestion of   right heart strain as detailed above.    Somewhat consolidative opacities in bilateral lower lobes, raising   concern for developing pneumonia. Pulmonary infarcts considered in the   differential. Pulmonary imaging follow-up in 6 weeks advised demonstrate   resolution.    Trace left pleural effusion.    Few bilateral sub-pleural based nodules, largest measuring up to 5mm in   left lower lobe. Pulmonary imaging follow-up in one year is advised if   the patient is increased risk for neoplasm.    Heterogeneously enlarged prostate, cause mass effect and protrusion at   the bladder base. Correlate with PSA and further nonemergent workup to   exclude underlying prostatic malignancy.    Additional findings as mentioned above.    These critical results were discussed via telephone at 10/7/2022 1:03 AM   by Dr. Serrano of radiology with Dr. Lane, read-back was followed.    --- End of Report ---      U/S Doppler negative for DVT b/l      Tubes/Lines:  Peripheral UE IV    GLOBAL ISSUE/BEST PRACTICE:  Analgesia: N  Sedation: N  HOB elevation: yes  Stress ulcer prophylaxis: N  VTE prophylaxis: Y  Glycemic control: N  Nutrition: Y    CODE STATUS: DNR/DNI        Patient is a 87y old  Male who presents with a chief complaint of PE     Interval events: No interval events.  Patient seen and examined at bedside. Patient in no acute distress. Denies CP, SOB, abd pain or any other complaints.     Patient had no overnight events. He is doing well and only complains of a cough that has been bothering him for the past few weeks. Social work was reached out to hopefully discharge the patient as soon as possible.    Review of Systems:  Constitutional: no fever, chills, fatigue  Neuro: no headache, numbness, weakness  Resp: no cough, wheezing, shortness of breath  CVS: no CP, no palpitations, leg swelling  GI: no abdominal pain, nausea, vomiting, diarrhea   : no dysuria, frequency, incontinence    LABS:                        10.7   8.02  )-----------( 239      ( 10 Oct 2022 06:26 )             32.1     10-10    142  |  109<H>  |  18  ----------------------------<  104<H>  3.8   |  25  |  0.78    Ca    8.7      10 Oct 2022 06:26  Phos  3.3     10-10  Mg     2.2     10-10    TPro  6.3  /  Alb  2.3<L>  /  TBili  0.5  /  DBili  x   /  AST  136<H>  /  ALT  192<H>  /  AlkPhos  71  10-10      Urinalysis Basic - ( 10 Oct 2022 10:00 )    Color: Yellow / Appearance: Clear / S.015 / pH: x  Gluc: x / Ketone: Negative  / Bili: Negative / Urobili: Negative   Blood: x / Protein: 15 / Nitrite: Negative   Leuk Esterase: Negative / RBC: 0-2 /HPF / WBC 0-2   Sq Epi: x / Non Sq Epi: Occasional / Bacteria: x        VITAL SIGNS:  T(C): 36.8 (10-10-22 @ 12:41), Max: 37.3 (10-09-22 @ 15:55)  HR: 65 (10-10-22 @ 12:00) (56 - 104)  BP: 121/66 (10-10-22 @ 12:00) (107/57 - 153/74)  RR: 24 (10-10-22 @ 12:00) (20 - 32)  SpO2: 96% (10-10-22 @ 12:00) (90% - 97%)    Physical Exam:     Gen: elderly male, NAD  Neuro: awake and alert, appropriately responsive to questions  HEENT: NCAT, EOMI  CV: regular rate and rhythm. no murmurs rubs or gallops  Pulm: CTAB, no wheezes rales or rhonchi  GI: soft NTND, +BS  Ext: no edema b/l LE, nontender  Skin: warm and well perfused    Meds:  MEDICATIONS  (STANDING):  apixaban 10 milliGRAM(s) Oral every 12 hours  chlorhexidine 2% Cloths 1 Application(s) Topical <User Schedule>  finasteride 5 milliGRAM(s) Oral daily  influenza  Vaccine (HIGH DOSE) 0.7 milliLiter(s) IntraMuscular once  lidocaine   4% Patch 1 Patch Transdermal every 24 hours  metoprolol tartrate 25 milliGRAM(s) Oral two times a day  metoprolol tartrate Injectable 5 milliGRAM(s) IV Push once  pantoprazole  Injectable 40 milliGRAM(s) IV Push daily  rivastigmine patch  9.5 mG/24 Hr(s) 1 Patch Transdermal every 24 hours  simvastatin 20 milliGRAM(s) Oral at bedtime    MEDICATIONS  (PRN):                 Radiology:   CT ABDOMEN AND PELVIS IC                        ACC: [id number] EXAM:  CT ANGIO CHEST PULM [facility]                          PROCEDURE DATE:  10/07/2022          INTERPRETATION:  CLINICAL INFORMATION: Dyspnea on exertion. Abdominal   pain.    COMPARISON: None.    CONTRAST/COMPLICATIONS:  IV Contrast: Omnipaque 350 (accession [id number]), IV contrast documented   in associated exam (accession [id number])  90 cc administered (accession   [id number]), 0 cc administered (accession [id number])  Oral Contrast: NONE  Complications: None reported at time of study completion    PROCEDURE:  CT of the Chest, Abdomen and Pelvis was performed.  Sagittal and coronal reformats were performed.    FINDINGS:  CHEST:  LUNGS AND LARGE AIRWAYS: Patent central airways. Somewhat consolidative   opacities in bilateral lower lobes, raising concern for developing   pneumonia. Pulmonary infarcts considered in the differential. Few   bilateral sub-pleural based nodules, largest measuring up to 5mm in left   lower lobe on image 54 series 2. 3 mm subpleural based nodule in right   middle lobe on image 54 series 2.  PLEURA: Trace left pleural effusion.  VESSELS: There are extensive filling defects identified within bilateral   lobar pulmonary arteries extending to bilateral lower lobe segmental to   segmental branches, compatible with pulmonary emboli. Additional small   filling defect identified within the origin of the right middle lobe,   right upper lobe and lingular branch. Atherosclerotic changes.  HEART: Mild cardiomegaly with asymmetric enlargement the right cardiac   chamber. Recommend further evaluation to exclude right heart strain in   appropriate clinical setting. No pericardial effusion.  MEDIASTINUM AND ROLY: No lymphadenopathy.  CHEST WALL AND LOWER NECK: Within normal limits.    ABDOMEN AND PELVIS:  LIVER: Within normal limits.  BILE DUCTS: Normal caliber.  GALLBLADDER: Within normal limits.  SPLEEN: Within normal limits.  PANCREAS: Within normal limits.  ADRENALS: Within normal limits.  KIDNEYS/URETERS: No hydronephrosis. Bilateral renal cysts as well as too   small to characterize hypodensities.    BLADDER: Mild wall thickening, difficult to assess secondary to   inadequate distention.  REPRODUCTIVE ORGANS: Heterogeneously enlarged prostate, cause mass effect   and protrusion at the bladder base. Correlate with PSA and further   nonemergent workup to exclude underlying prostatic malignancy.    BOWEL: Small hiatal hernia. No bowel obstruction. Normal appendix.  PERITONEUM: No ascites.  VESSELS: Atherosclerotic changes.  RETROPERITONEUM/LYMPH NODES: No lymphadenopathy.  ABDOMINAL WALL: A small fat containing umbilical hernia is noted. Right   groin hernia repair postoperative changes.    BONES: Multilevel degenerative changes of the spine.    IMPRESSION:    Findings compatible with bilateral pulmonary emboli with suggestion of   right heart strain as detailed above.    Somewhat consolidative opacities in bilateral lower lobes, raising   concern for developing pneumonia. Pulmonary infarcts considered in the   differential. Pulmonary imaging follow-up in 6 weeks advised demonstrate   resolution.    Trace left pleural effusion.    Few bilateral sub-pleural based nodules, largest measuring up to 5mm in   left lower lobe. Pulmonary imaging follow-up in one year is advised if   the patient is increased risk for neoplasm.    Heterogeneously enlarged prostate, cause mass effect and protrusion at   the bladder base. Correlate with PSA and further nonemergent workup to   exclude underlying prostatic malignancy.    Additional findings as mentioned above.    These critical results were discussed via telephone at 10/7/2022 1:03 AM   by Dr. Serrano of radiology with Dr. Lane, read-back was followed.    --- End of Report ---      U/S Doppler negative for DVT b/l      Tubes/Lines:  Peripheral UE IV    GLOBAL ISSUE/BEST PRACTICE:  Analgesia: N  Sedation: N  HOB elevation: yes  Stress ulcer prophylaxis: N  VTE prophylaxis: Y  Glycemic control: N  Nutrition: Y    CODE STATUS: DNR/DNI

## 2022-10-12 NOTE — PROGRESS NOTE ADULT - PROBLEM SELECTOR PLAN 5
stable; Continue Rivastigmine
Chronic   - Pt is on home Simvastatin 20 mg QD
stable; Continue Rivastigmine

## 2022-10-14 ENCOUNTER — INPATIENT (INPATIENT)
Facility: HOSPITAL | Age: 87
LOS: 3 days | Discharge: NURSING, REHAB OR LONG-TERM CARE | DRG: 177 | End: 2022-10-18
Attending: INTERNAL MEDICINE | Admitting: STUDENT IN AN ORGANIZED HEALTH CARE EDUCATION/TRAINING PROGRAM
Payer: MEDICARE

## 2022-10-14 ENCOUNTER — APPOINTMENT (OUTPATIENT)
Dept: PULMONOLOGY | Facility: CLINIC | Age: 87
End: 2022-10-14

## 2022-10-14 VITALS
DIASTOLIC BLOOD PRESSURE: 66 MMHG | OXYGEN SATURATION: 96 % | TEMPERATURE: 98 F | RESPIRATION RATE: 18 BRPM | WEIGHT: 199.96 LBS | HEART RATE: 74 BPM | HEIGHT: 72 IN | SYSTOLIC BLOOD PRESSURE: 125 MMHG

## 2022-10-14 DIAGNOSIS — R53.1 WEAKNESS: ICD-10-CM

## 2022-10-14 DIAGNOSIS — Z98.890 OTHER SPECIFIED POSTPROCEDURAL STATES: Chronic | ICD-10-CM

## 2022-10-14 LAB
ALBUMIN SERPL ELPH-MCNC: 2.9 G/DL — LOW (ref 3.3–5)
ALP SERPL-CCNC: 77 U/L — SIGNIFICANT CHANGE UP (ref 40–120)
ALT FLD-CCNC: 164 U/L — HIGH (ref 12–78)
ANION GAP SERPL CALC-SCNC: 8 MMOL/L — SIGNIFICANT CHANGE UP (ref 5–17)
APPEARANCE UR: CLEAR — SIGNIFICANT CHANGE UP
APTT BLD: 45.3 SEC — HIGH (ref 27.5–35.5)
AST SERPL-CCNC: 77 U/L — HIGH (ref 15–37)
BASOPHILS # BLD AUTO: 0 K/UL — SIGNIFICANT CHANGE UP (ref 0–0.2)
BASOPHILS NFR BLD AUTO: 0 % — SIGNIFICANT CHANGE UP (ref 0–2)
BILIRUB SERPL-MCNC: 0.6 MG/DL — SIGNIFICANT CHANGE UP (ref 0.2–1.2)
BILIRUB UR-MCNC: NEGATIVE — SIGNIFICANT CHANGE UP
BUN SERPL-MCNC: 26 MG/DL — HIGH (ref 7–23)
CALCIUM SERPL-MCNC: 9.1 MG/DL — SIGNIFICANT CHANGE UP (ref 8.5–10.1)
CHLORIDE SERPL-SCNC: 103 MMOL/L — SIGNIFICANT CHANGE UP (ref 96–108)
CO2 SERPL-SCNC: 28 MMOL/L — SIGNIFICANT CHANGE UP (ref 22–31)
COLOR SPEC: YELLOW — SIGNIFICANT CHANGE UP
CREAT SERPL-MCNC: 1.2 MG/DL — SIGNIFICANT CHANGE UP (ref 0.5–1.3)
DIFF PNL FLD: ABNORMAL
EGFR: 59 ML/MIN/1.73M2 — LOW
EOSINOPHIL # BLD AUTO: 0 K/UL — SIGNIFICANT CHANGE UP (ref 0–0.5)
EOSINOPHIL NFR BLD AUTO: 0 % — SIGNIFICANT CHANGE UP (ref 0–6)
GLUCOSE SERPL-MCNC: 122 MG/DL — HIGH (ref 70–99)
GLUCOSE UR QL: NEGATIVE — SIGNIFICANT CHANGE UP
HCT VFR BLD CALC: 35.7 % — LOW (ref 39–50)
HGB BLD-MCNC: 11.7 G/DL — LOW (ref 13–17)
IMM GRANULOCYTES NFR BLD AUTO: 0.7 % — SIGNIFICANT CHANGE UP (ref 0–0.9)
INR BLD: 2.34 RATIO — HIGH (ref 0.88–1.16)
KETONES UR-MCNC: ABNORMAL
LACTATE SERPL-SCNC: 1.3 MMOL/L — SIGNIFICANT CHANGE UP (ref 0.7–2)
LEUKOCYTE ESTERASE UR-ACNC: NEGATIVE — SIGNIFICANT CHANGE UP
LYMPHOCYTES # BLD AUTO: 1.28 K/UL — SIGNIFICANT CHANGE UP (ref 1–3.3)
LYMPHOCYTES # BLD AUTO: 18 % — SIGNIFICANT CHANGE UP (ref 13–44)
MCHC RBC-ENTMCNC: 29.8 PG — SIGNIFICANT CHANGE UP (ref 27–34)
MCHC RBC-ENTMCNC: 32.8 GM/DL — SIGNIFICANT CHANGE UP (ref 32–36)
MCV RBC AUTO: 91.1 FL — SIGNIFICANT CHANGE UP (ref 80–100)
MONOCYTES # BLD AUTO: 1.04 K/UL — HIGH (ref 0–0.9)
MONOCYTES NFR BLD AUTO: 14.6 % — HIGH (ref 2–14)
NEUTROPHILS # BLD AUTO: 4.74 K/UL — SIGNIFICANT CHANGE UP (ref 1.8–7.4)
NEUTROPHILS NFR BLD AUTO: 66.7 % — SIGNIFICANT CHANGE UP (ref 43–77)
NITRITE UR-MCNC: NEGATIVE — SIGNIFICANT CHANGE UP
NRBC # BLD: 0 /100 WBCS — SIGNIFICANT CHANGE UP (ref 0–0)
PH UR: 5 — SIGNIFICANT CHANGE UP (ref 5–8)
PLATELET # BLD AUTO: 294 K/UL — SIGNIFICANT CHANGE UP (ref 150–400)
POTASSIUM SERPL-MCNC: 4.3 MMOL/L — SIGNIFICANT CHANGE UP (ref 3.5–5.3)
POTASSIUM SERPL-SCNC: 4.3 MMOL/L — SIGNIFICANT CHANGE UP (ref 3.5–5.3)
PROT SERPL-MCNC: 7.4 G/DL — SIGNIFICANT CHANGE UP (ref 6–8.3)
PROT UR-MCNC: 15
PROTHROM AB SERPL-ACNC: 27.6 SEC — HIGH (ref 10.5–13.4)
RBC # BLD: 3.92 M/UL — LOW (ref 4.2–5.8)
RBC # FLD: 13 % — SIGNIFICANT CHANGE UP (ref 10.3–14.5)
SODIUM SERPL-SCNC: 139 MMOL/L — SIGNIFICANT CHANGE UP (ref 135–145)
SP GR SPEC: 1.02 — SIGNIFICANT CHANGE UP (ref 1.01–1.02)
UROBILINOGEN FLD QL: 1
WBC # BLD: 7.11 K/UL — SIGNIFICANT CHANGE UP (ref 3.8–10.5)
WBC # FLD AUTO: 7.11 K/UL — SIGNIFICANT CHANGE UP (ref 3.8–10.5)

## 2022-10-14 PROCEDURE — 93010 ELECTROCARDIOGRAM REPORT: CPT

## 2022-10-14 PROCEDURE — 99223 1ST HOSP IP/OBS HIGH 75: CPT | Mod: GC

## 2022-10-14 PROCEDURE — 71045 X-RAY EXAM CHEST 1 VIEW: CPT | Mod: 26

## 2022-10-14 PROCEDURE — 99285 EMERGENCY DEPT VISIT HI MDM: CPT | Mod: FS,CS

## 2022-10-14 RX ORDER — SODIUM CHLORIDE 9 MG/ML
1000 INJECTION INTRAMUSCULAR; INTRAVENOUS; SUBCUTANEOUS ONCE
Refills: 0 | Status: COMPLETED | OUTPATIENT
Start: 2022-10-14 | End: 2022-10-14

## 2022-10-14 RX ORDER — APIXABAN 2.5 MG/1
5 TABLET, FILM COATED ORAL EVERY 12 HOURS
Refills: 0 | Status: DISCONTINUED | OUTPATIENT
Start: 2022-10-14 | End: 2022-10-18

## 2022-10-14 RX ORDER — ACETAMINOPHEN 500 MG
650 TABLET ORAL ONCE
Refills: 0 | Status: COMPLETED | OUTPATIENT
Start: 2022-10-14 | End: 2022-10-14

## 2022-10-14 RX ADMIN — SODIUM CHLORIDE 1000 MILLILITER(S): 9 INJECTION INTRAMUSCULAR; INTRAVENOUS; SUBCUTANEOUS at 22:15

## 2022-10-14 RX ADMIN — Medication 650 MILLIGRAM(S): at 22:34

## 2022-10-14 NOTE — H&P ADULT - PROBLEM SELECTOR PLAN 1
- Pt presenting with non-specific symptoms of generalized weakness and confusion, with mild URI symptoms, cough with fever , found to be COVID positive  - hypoxia <89% on RA, on 2litre NC   - CXR;  negative - No pneumothorax, No opacities, No free air.  - Given Tylenol and IV fluid 1litre NS in ER   - Will start dexamethasone 6mg PO for 10days and remdesivir  5 days   - Continue with supportive care (Tylenol, Robitussin)  - Continuos remote pulse ox monitoring  - Check  procal, D-Dimer, LDH, CRP, Ferritin levels in AM  - PT/OT/SW consulted for generalized weakness, impaired ambulation and ADLs--> REHAB placement   - ID consulted- Dr. Farooq, will follow rec

## 2022-10-14 NOTE — ED ADULT TRIAGE NOTE - CHIEF COMPLAINT QUOTE
Diagnosed with Pulmonary emboli. Patient was sleeping yesterday on the floor as per wife. recently got discharge from hospital. + Cough with blood mucus. Patient is on blood thinner

## 2022-10-14 NOTE — H&P ADULT - NSICDXPASTMEDICALHX_GEN_ALL_CORE_FT
PAST MEDICAL HISTORY:  Dementia     Enlarged prostate     HLD (hyperlipidemia)     Pulmonary embolism      PAST MEDICAL HISTORY:  Atrial fibrillation with RVR     Dementia     Enlarged prostate     HLD (hyperlipidemia)     Pulmonary embolism

## 2022-10-14 NOTE — H&P ADULT - PROBLEM SELECTOR PLAN 9
Eliquis 5mg BID Chronic , on home Finasteride 5 mg QD  - on last admission CT A/P: Heterogeneously enlarged prostate, cause mass effect and protrusion at the bladder base. - Would recommend to correlate with PSA and pt may follow up outpt with workup to exclude underlying prostatic malignancy.

## 2022-10-14 NOTE — ED ADULT NURSE NOTE - CHIEF COMPLAINT QUOTE
LOS. This note was charted remotely.
Diagnosed with Pulmonary emboli. Patient was sleeping yesterday on the floor as per wife. recently got discharge from hospital. + Cough with blood mucus. Patient is on blood thinner

## 2022-10-14 NOTE — H&P ADULT - HISTORY OF PRESENT ILLNESS
... Patient is 86 yo M with PMH HLD, BPH and dementia presents to the ED with abdominal pain. Obtained collateral history from wife Lima ([phone]). Patient is AAOx2,reports he is feeling fine, mild cough with fever. Per wife, he has this intermittent cough for past 1 week and Patient was admitted on 10/7/22 for b/l PE, started on heparin gtt for b/l submassive PE. LE Dopplers negative for DVT b/l. Transitioned from heparin gtt to Eliquis. CT with evidence of RV strain, TTE performed showed EF of 65%. Patient with new onset Afib with RVR in ICU as well, started on metoprolol 25mg BID for rate control. Patient was sent home with Eliquis starter pack 10mg BID( DAY 6) on discharge, now transition to 5mg BID   Per wife, patient has been very weak unable to get out of bed despite having a walker unable to walk around the house.  Patient has pulmonary appointment today and was able unable to get him into the office.  Patient wife also states patient has been more confused at times.  No falls or trauma. Reports coughing up blood a few times with clot once, with decrease appetite, not drinking and eating enough. Denies sick contacts.    IN ED  VITALS: /65 RR 16 HR 80 temp 101.5 f rectal , on 2 litre NC sat >90%  PERTINENT LABS: H/H 11.7/35.7 BUN/Cr: 26/1.2, AST 77,  ALBUMIN 2.9   UA: negative for UTI   RVP PCR: POSITIVE COVID  CXR;  negative - No pneumothorax, No opacities, No free air.  EKG; NSR with sinus arrhthymias QT/QTc 412/478    Patient is 88 yo M with PMH HLD, BPH and dementia presents to the ED with abdominal pain. Obtained collateral history from wife Lima ([phone]). Patient is AAOx2,reports he is feeling fine, mild cough with fever. Per wife, he has this intermittent cough for past 1 week and Patient was admitted on 10/7/22 for b/l PE, started on heparin gtt for b/l submassive PE. LE Dopplers negative for DVT b/l. Transitioned from heparin gtt to Eliquis. CT with evidence of RV strain, TTE performed showed EF of 65%. Patient with new onset Afib with RVR in ICU as well, started on metoprolol 25mg BID for rate control. Patient was sent home with Eliquis starter pack 10mg BID( DAY 6) on discharge, now transition to 5mg BID   Per wife, patient has been very weak unable to get out of bed despite having a walker unable to walk around the house.  Patient has pulmonary appointment today and was able unable to get him into the office.  Patient wife also states patient has been more confused at times.  No falls or trauma. Reports coughing up blood a few times with clot once, with decrease appetite, not drinking and eating enough. Denies sick contacts.    GOC discussed with wife : HCP   WIFE LIMA [phone]--> DNR/DNI     IN ED  VITALS: /65 RR 16 HR 80 temp 101.5 f rectal , on 2 litre NC sat >90%  PERTINENT LABS: H/H 11.7/35.7 BUN/Cr: 26/1.2, AST 77,  ALBUMIN 2.9   UA: negative for UTI   RVP PCR: POSITIVE COVID  CXR;  negative - No pneumothorax, No opacities, No free air.  EKG; NSR with sinus arrhthymias QT/QTc 412/478    88 yo M with PMHx of HLD, BPH and dementia presents to the ED with hemoptysis and weakness. Obtained collateral history from wife Lima ([phone]). Patient is AAOx2,reports he is feeling fine, mild cough with fever. Per wife, he has this intermittent cough for past 1 week and Patient was admitted on 10/7/22 for b/l PE, started on heparin gtt for b/l submassive PE. LE Dopplers negative for DVT b/l. Transitioned from heparin gtt to Eliquis. CT with evidence of RV strain, TTE performed showed EF of 65%. Patient with new onset Afib with RVR in ICU as well, started on metoprolol 25mg BID for rate control. Patient was sent home with Eliquis starter pack 10mg BID( DAY 6) on discharge, now transition to 5mg BID.    Per wife, patient has been very weak unable to get out of bed despite having a walker unable to walk around the house.  Patient has pulmonary appointment today and was able unable to get him into the office.  Patient wife also states patient has been more confused at times.  No falls or trauma. Reports coughing up blood a few times with clot once, with decrease appetite, not drinking and eating enough. Denies sick contacts.    GOC discussed with wife : HCP   WIFE LIMA [phone]--> DNR/DNI     IN THE ED,  VITALS: /65 RR 16 HR 80 temp 101.5 f rectal , on 2 litre NC sat >90%  PERTINENT LABS: H/H 11.7/35.7 BUN/Cr: 26/1.2, AST 77,  ALBUMIN 2.9   UA: negative for UTI   RVP PCR: POSITIVE COVID  CXR;  negative - No pneumothorax, No opacities, No free air.  Previous CT Angio last admission showed concern for developing PNA  EKG; NSR with sinus arrhthymias QT/QTc 412/478

## 2022-10-14 NOTE — H&P ADULT - PROBLEM SELECTOR PLAN 8
- Chronic   - continue home Simvastatin 20 mg QD. - Chronic   - Pt is on home Rivastigmine patch QD.

## 2022-10-14 NOTE — ED PROVIDER NOTE - CLINICAL SUMMARY MEDICAL DECISION MAKING FREE TEXT BOX
87-year-old male from home with recent discharge yesterday for PE on blood thinner complaining of 1 episode of bloody sputum.  Patient denies any shortness of breath, cough, fevers, chest pain.  Will check H&H and call pulmonary critical care for further assistance and evaluation.

## 2022-10-14 NOTE — ED PROVIDER NOTE - PROGRESS NOTE DETAILS
spoke with Dr. Orozco advised nothing to do for coughing up blood   will hold for social work/pt spoke with Dr. Orozco advised nothing to do for coughing up blood hgb stable   will hold for social work/pt pt febrile 101.5 septic labs added ua wnl lactate wnl will admit for fever and weakness

## 2022-10-14 NOTE — ED ADULT NURSE NOTE - NSFALLRSKOUTCOME_ED_ALL_ED
Pieter Reed is a 75 year old female presenting with EMG   Referred by Dr. Cedric Santacruz      Fall with Harm Risk

## 2022-10-14 NOTE — H&P ADULT - PROBLEM SELECTOR PLAN 7
Chronic , on home Finasteride 5 mg QD  - on last admission CT A/P: Heterogeneously enlarged prostate, cause mass effect and protrusion at the bladder base. - Would recommend to correlate with PSA and pt may follow up outpt with workup to exclude underlying prostatic malignancy. - H/H 11.7/35.7 baseline 11-12, chronic anemia   - Monitor for signs/symptoms of bleeding  - Monitor daily CBC.

## 2022-10-14 NOTE — ED PROVIDER NOTE - OBJECTIVE STATEMENT
Patient is a 87-year-old male with past medical hx of dementia hyperlipidemia discharged yesterday with diagnosis of bilateral PE with strain on Eliquis brought in by EMS for coughing up blood a few times with clot once.  Spoke with wife who states patient has been very weak unable to get out of bed despite having a walker unable to walk around the house.  Patient states she had a pulmonary appointment scheduled for him today and was able unable to get him into the office.  Patient wife also states patient has been more confused at times.  No falls or trauma.  Patient currently doing does not have any 8 hours cannot set up for physical therapy    pcp Dr. Bronson

## 2022-10-14 NOTE — ED PROVIDER NOTE - WR ORDER ID 1
"Chief Complaint   Patient presents with     Physical     Depression     Anxiety       Initial /80 (BP Location: Left arm, Patient Position: Sitting, Cuff Size: Adult Regular)  Pulse 84  Temp 98.4  F (36.9  C) (Tympanic)  Ht 5' 7\" (1.702 m)  Wt 235 lb (106.6 kg)  SpO2 98%  BMI 36.81 kg/m2 Estimated body mass index is 36.81 kg/(m^2) as calculated from the following:    Height as of this encounter: 5' 7\" (1.702 m).    Weight as of this encounter: 235 lb (106.6 kg).  Medication Reconciliation: complete    Monica Lanier LPN  " [id number]

## 2022-10-14 NOTE — H&P ADULT - ASSESSMENT
recent admission for PE p/w weakness, fever Patient is 86 yo M with PMH HLD, BPH and dementia presents to the ED with abdominal pain. Obtained collateral history from wife Lima ([phone]). Patient is AAOx2,reports he is feeling fine, mild cough with fever. Per wife, he has this intermittent cough for past 1 week and Patient was admitted on 10/7/22 for b/l PE p/w weakness, fever. RVP PCR : COVID positive. Admitted for Covid and Rehab placement  86 yo M with PMHx of HLD, BPH, dementia, recent admission for PE c/b new onset afib with rvr presents to the ED with hemoptysis and weakness. Obtained collateral history from wife Lima ([phone]). Patient is AAOx2,reports he is feeling fine, mild cough with fever. Per wife, he has this intermittent cough for past 1 week and Patient was admitted on 10/7/22 for b/l PE p/w weakness, fever. RVP PCR : COVID positive. Admitted for Covid and Rehab placement

## 2022-10-14 NOTE — ED ADULT NURSE NOTE - OBJECTIVE STATEMENT
Patient is 86yo M BIB wife with c/o progressively worsening weakness and confusion since being discharged from the hospital on Wednesday. Patient oriented to self, clear speech but per wife he is not telling the right story, is very confused. Patient states he was in the hospital for 3 weeks in the ICU, wife reports 6 days. Wife reports patient was discharged on Eliquis for a PE, reports today he was coughing up blood clots and possibly has blood in the urine. Wife also reports extreme weakness, reports patient was able to ambulate with walker at the hospital and is pending home PT but today and yesterday was unable to stand, possibly had a fall yesterday where he was down on the bathroom floor, denies falling, stayed on floor for the night until son could arrive and help him up. Wife also reports she went to pulmonology follow up appointment today where patient could not get out of the car, so she was told to being him to the ED by the staff.

## 2022-10-14 NOTE — H&P ADULT - PROBLEM SELECTOR PLAN 6
- Chronic   - Pt is on home Rivastigmine patch QD. . Patient with new onset afib with RVR in ICU, last admission  - Started on metoprolol 25mg BID for rate control. Continue   - AC Eliquis 5mg BID   - EKG; NSR with sinus arrhthymias QT/QTc 412/478 ( today)

## 2022-10-14 NOTE — ED PROVIDER NOTE - CONSTITUTIONAL, MLM
Well appearing, awake, alert, oriented to person, place, not time/situation and in no apparent distress no actively coughing normal...

## 2022-10-14 NOTE — H&P ADULT - ATTENDING COMMENTS
86 yo M with PMHx of HLD, BPH, dementia, recent admission for PE c/b new onset afib with rvr presents to the ED with hemoptysis and weakness admitted for weakness, COVID-19 with hypoxemia. Admit to medicine. Suspect that patient possibly had COVID on last admission as the CT angio showed developing infiltrates. He tested negative at that time. Now with fever and weakness likely secondary to COVID-19. Noted to have some hypoxemia in the ED and was placed on supplemental O2. Will continue with remdesivir and decadron pending ID recs. Continue AC for PE/afib. PT/SW consult for possible CARLYN. D/w patient at bedside. DNR/DNI.    Agree with H&P as outlined above, edited where appropriate.

## 2022-10-14 NOTE — H&P ADULT - PROBLEM SELECTOR PLAN 2
- patient presented with weakness 2/2 decrease PO intake and covid   - ALBUMIN 2.9  - Encourage PO intake   - s/p 1 litre NS  - maintenance  fluid 50cc/hr for 12 hrs also in the setting of increase cr   - aspiration  precautions  - fall risk protocol  - PT/OT consult - patient presented with weakness 2/2 decrease PO intake and covid   - ALBUMIN 2.9  - Encourage PO intake   - s/p 1 litre NS  - maintenance  fluid 50cc/hr for 12 hrs also in the setting of increase cr   - TTE performed last admission, showed EF of 65%.   - aspiration  precautions  - fall risk protocol  - PT/OT consult  - Nutrition consult In the setting of known PE on AC  - cleared by pulm to continue AC  - pulm Dr. Orozco consulted by ER

## 2022-10-14 NOTE — H&P ADULT - PROBLEM SELECTOR PLAN 3
- On admission, BUN/Cr: 26/1.2  baseline 0.7-0.8   - likely prerenal azotemia from decreased PO intake   - s/p 1 litre NS in ED  - NS at rate of 50 cc/hr x 12 hours   - Avoid nephrotoxic agents  - F/u BMP in AM Recent admission for PE with right heart strain  - in retrospect may have been provoked by COVID-19 given presence of infiltrates  - continue therapeutic AC with apixaban

## 2022-10-14 NOTE — H&P ADULT - PROBLEM SELECTOR PLAN 4
. Patient with new onset afib with RVR in ICU, last admission  - Started on metoprolol 25mg BID for rate control. Continue   - AC Eliquis 5mg BID   - EKG; NSR with sinus arrhthymias QT/QTc 412/478 ( today) - patient presented with weakness 2/2 decrease PO intake and covid   - ALBUMIN 2.9  - Encourage PO intake   - s/p 1 litre NS  - maintenance  fluid 50cc/hr for 12 hrs also in the setting of increase cr   - TTE performed last admission, showed EF of 65%.   - aspiration  precautions  - fall risk protocol  - PT/OT consult  - Nutrition consult

## 2022-10-14 NOTE — H&P ADULT - PROBLEM SELECTOR PLAN 5
- H/H 11.7/35.7 baseline 11-12, chronic anemia   - Monitor for signs/symptoms of bleeding  - Monitor daily CBC. - On admission, BUN/Cr: 26/1.2  baseline 0.7-0.8   - likely prerenal azotemia from decreased PO intake   - s/p 1 litre NS in ED  - NS at rate of 50 cc/hr x 12 hours   - Avoid nephrotoxic agents  - F/u BMP in AM

## 2022-10-14 NOTE — H&P ADULT - NSHPPHYSICALEXAM_GEN_ALL_CORE
T(C): 38.6 (10-14-22 @ 20:24), Max: 38.6 (10-14-22 @ 20:24)  HR: 80 (10-14-22 @ 20:36) (74 - 80)  BP: 120/65 (10-14-22 @ 20:36) (120/65 - 125/66)  RR: 16 (10-14-22 @ 20:36) (16 - 18)  SpO2: 96% (10-14-22 @ 20:36) (96% - 96%)    General: No apparent distress  Head: normocephalic, atraumatic  Eyes: EOMI, anicteric  ENT: moist mucous membranes, no pharyngeal exudates  Heart: rrr, S1, S2, no murmurs  Chest: CTA b/l, no rales, rhonchi, or wheezes  Abd: BS+, soft, NT, ND  Back: no CVA tenderness  Extr: no edema or cyanosis  Neuro: Awake and alert, no focal weakness, sensation to light touch intact  Psych: normal affect T(C): 38.6 (10-14-22 @ 20:24), Max: 38.6 (10-14-22 @ 20:24)  HR: 80 (10-14-22 @ 20:36) (74 - 80)  BP: 120/65 (10-14-22 @ 20:36) (120/65 - 125/66)  RR: 16 (10-14-22 @ 20:36) (16 - 18)  SpO2: 96% (10-14-22 @ 20:36) (96% - 96%)    General: No apparent distress, on 2 litre NC sat >90%   Head: normocephalic, atraumatic  Eyes: EOMI, anicteric  ENT: moist mucous membranes, no pharyngeal exudates  Heart: rrr, S1, S2, no murmurs  Chest: CTA b/l, no rales, rhonchi, or wheezes  Abd: BS+, soft, NT, ND  Back: no CVA tenderness  Extr: no edema or cyanosis  Neuro: Awake and alert, no focal weakness, sensation to light touch intact  Psych: normal affect T(C): 38.6 (10-14-22 @ 20:24), Max: 38.6 (10-14-22 @ 20:24)  HR: 80 (10-14-22 @ 20:36) (74 - 80)  BP: 120/65 (10-14-22 @ 20:36) (120/65 - 125/66)  RR: 16 (10-14-22 @ 20:36) (16 - 18)  SpO2: 96% (10-14-22 @ 20:36) (96% - 96%)    General: No apparent distress, on 2 litre NC sat >90% + diaphoretic , febrile   Head: normocephalic, atraumatic  Eyes: EOMI, anicteric  ENT: moist mucous membranes, no pharyngeal exudates  Heart: rrr, S1, S2, no murmurs  Chest: CTA b/l, no rales, rhonchi, or wheezes  Abd: BS+, soft, NT, ND  Back: no CVA tenderness  Extr: no edema or cyanosis  Neuro: Awake and alert, no focal weakness, sensation to light touch intact  Psych: normal affect T(C): 38.6 (10-14-22 @ 20:24), Max: 38.6 (10-14-22 @ 20:24)  HR: 80 (10-14-22 @ 20:36) (74 - 80)  BP: 120/65 (10-14-22 @ 20:36) (120/65 - 125/66)  RR: 16 (10-14-22 @ 20:36) (16 - 18)  SpO2: 96% (10-14-22 @ 20:36) (96% - 96%)    General: No apparent distress, on 2 litre NC sat >90% + diaphoretic , febrile   Head: normocephalic, atraumatic  Eyes: EOMI, anicteric  ENT: moist mucous membranes, no pharyngeal exudates  Heart: rrr, S1, S2, no murmurs  Chest: CTA b/l, no rales, rhonchi, or wheezes  Abd: BS+, soft, NT, ND  Back: no CVA tenderness  Extr: no edema or cyanosis  Neuro: Awake and alert (although confused), follows commands, no focal weakness, sensation to light touch intact  Psych: normal affect

## 2022-10-14 NOTE — H&P ADULT - NSHPREVIEWOFSYSTEMS_GEN_ALL_CORE
Review of Systems:  CONSTITUTIONAL: No fever, weight loss, ++ fatigue, + weakness   EYES: No eye pain, visual disturbances, or discharge  ENMT: No ear pain, No nose bleed; No sinus or throat pain  NECK: No pain or stiffness  RESPIRATORY: + cough, no wheezing, + hemoptysis, ++ shortness of breath  CARDIOVASCULAR: No chest pain, palpitations, no leg swelling  GASTROINTESTINAL: No abdominal or epigastric pain. No nausea, vomiting, or hematemesis; No diarrhea or constipation. No melena or hematochezia.  GENITOURINARY: No dysuria, frequency, hematuria, or incontinence  NEUROLOGICAL: No headaches, memory loss, loss of strength, numbness; No dizziness  SKIN: No itching, burning, rashes, or lesions   MUSCULOSKELETAL: No joint pain or swelling; No muscle, back, or extremity pain

## 2022-10-15 ENCOUNTER — TRANSCRIPTION ENCOUNTER (OUTPATIENT)
Age: 87
End: 2022-10-15

## 2022-10-15 DIAGNOSIS — Z29.9 ENCOUNTER FOR PROPHYLACTIC MEASURES, UNSPECIFIED: ICD-10-CM

## 2022-10-15 DIAGNOSIS — Z87.438 PERSONAL HISTORY OF OTHER DISEASES OF MALE GENITAL ORGANS: ICD-10-CM

## 2022-10-15 DIAGNOSIS — R53.1 WEAKNESS: ICD-10-CM

## 2022-10-15 DIAGNOSIS — U07.1 COVID-19: ICD-10-CM

## 2022-10-15 DIAGNOSIS — F03.90 UNSPECIFIED DEMENTIA WITHOUT BEHAVIORAL DISTURBANCE: ICD-10-CM

## 2022-10-15 DIAGNOSIS — R04.2 HEMOPTYSIS: ICD-10-CM

## 2022-10-15 DIAGNOSIS — N17.9 ACUTE KIDNEY FAILURE, UNSPECIFIED: ICD-10-CM

## 2022-10-15 DIAGNOSIS — K59.00 CONSTIPATION, UNSPECIFIED: ICD-10-CM

## 2022-10-15 DIAGNOSIS — I26.99 OTHER PULMONARY EMBOLISM WITHOUT ACUTE COR PULMONALE: ICD-10-CM

## 2022-10-15 DIAGNOSIS — E78.5 HYPERLIPIDEMIA, UNSPECIFIED: ICD-10-CM

## 2022-10-15 DIAGNOSIS — I48.91 UNSPECIFIED ATRIAL FIBRILLATION: ICD-10-CM

## 2022-10-15 DIAGNOSIS — D64.9 ANEMIA, UNSPECIFIED: ICD-10-CM

## 2022-10-15 LAB
A1C WITH ESTIMATED AVERAGE GLUCOSE RESULT: 5.7 % — HIGH (ref 4–5.6)
ALBUMIN SERPL ELPH-MCNC: 2.2 G/DL — LOW (ref 3.3–5)
ALBUMIN SERPL ELPH-MCNC: 2.3 G/DL — LOW (ref 3.3–5)
ALP SERPL-CCNC: 56 U/L — SIGNIFICANT CHANGE UP (ref 40–120)
ALP SERPL-CCNC: 61 U/L — SIGNIFICANT CHANGE UP (ref 40–120)
ALT FLD-CCNC: 121 U/L — HIGH (ref 12–78)
ALT FLD-CCNC: 122 U/L — HIGH (ref 12–78)
ANION GAP SERPL CALC-SCNC: 7 MMOL/L — SIGNIFICANT CHANGE UP (ref 5–17)
AST SERPL-CCNC: 62 U/L — HIGH (ref 15–37)
AST SERPL-CCNC: 68 U/L — HIGH (ref 15–37)
BASOPHILS # BLD AUTO: 0.01 K/UL — SIGNIFICANT CHANGE UP (ref 0–0.2)
BASOPHILS NFR BLD AUTO: 0.2 % — SIGNIFICANT CHANGE UP (ref 0–2)
BILIRUB DIRECT SERPL-MCNC: 0.3 MG/DL — SIGNIFICANT CHANGE UP (ref 0–0.3)
BILIRUB INDIRECT FLD-MCNC: 0.3 MG/DL — SIGNIFICANT CHANGE UP (ref 0.2–1)
BILIRUB SERPL-MCNC: 0.5 MG/DL — SIGNIFICANT CHANGE UP (ref 0.2–1.2)
BILIRUB SERPL-MCNC: 0.6 MG/DL — SIGNIFICANT CHANGE UP (ref 0.2–1.2)
BUN SERPL-MCNC: 21 MG/DL — SIGNIFICANT CHANGE UP (ref 7–23)
CALCIUM SERPL-MCNC: 7.7 MG/DL — LOW (ref 8.5–10.1)
CHLORIDE SERPL-SCNC: 112 MMOL/L — HIGH (ref 96–108)
CO2 SERPL-SCNC: 24 MMOL/L — SIGNIFICANT CHANGE UP (ref 22–31)
CREAT SERPL-MCNC: 0.84 MG/DL — SIGNIFICANT CHANGE UP (ref 0.5–1.3)
CREAT SERPL-MCNC: 0.87 MG/DL — SIGNIFICANT CHANGE UP (ref 0.5–1.3)
CRP SERPL-MCNC: 36 MG/L — HIGH
D DIMER BLD IA.RAPID-MCNC: 798 NG/ML DDU — HIGH
EGFR: 84 ML/MIN/1.73M2 — SIGNIFICANT CHANGE UP
EOSINOPHIL # BLD AUTO: 0 K/UL — SIGNIFICANT CHANGE UP (ref 0–0.5)
EOSINOPHIL NFR BLD AUTO: 0 % — SIGNIFICANT CHANGE UP (ref 0–6)
ESTIMATED AVERAGE GLUCOSE: 117 MG/DL — HIGH (ref 68–114)
FERRITIN SERPL-MCNC: 822 NG/ML — HIGH (ref 30–400)
GLUCOSE SERPL-MCNC: 97 MG/DL — SIGNIFICANT CHANGE UP (ref 70–99)
HCT VFR BLD CALC: 30.4 % — LOW (ref 39–50)
HGB BLD-MCNC: 9.8 G/DL — LOW (ref 13–17)
IMM GRANULOCYTES NFR BLD AUTO: 0.9 % — SIGNIFICANT CHANGE UP (ref 0–0.9)
LDH SERPL L TO P-CCNC: 213 U/L — SIGNIFICANT CHANGE UP (ref 50–242)
LYMPHOCYTES # BLD AUTO: 1.48 K/UL — SIGNIFICANT CHANGE UP (ref 1–3.3)
LYMPHOCYTES # BLD AUTO: 27.9 % — SIGNIFICANT CHANGE UP (ref 13–44)
MCHC RBC-ENTMCNC: 29.7 PG — SIGNIFICANT CHANGE UP (ref 27–34)
MCHC RBC-ENTMCNC: 32.2 GM/DL — SIGNIFICANT CHANGE UP (ref 32–36)
MCV RBC AUTO: 92.1 FL — SIGNIFICANT CHANGE UP (ref 80–100)
MONOCYTES # BLD AUTO: 0.83 K/UL — SIGNIFICANT CHANGE UP (ref 0–0.9)
MONOCYTES NFR BLD AUTO: 15.6 % — HIGH (ref 2–14)
NEUTROPHILS # BLD AUTO: 2.94 K/UL — SIGNIFICANT CHANGE UP (ref 1.8–7.4)
NEUTROPHILS NFR BLD AUTO: 55.4 % — SIGNIFICANT CHANGE UP (ref 43–77)
NRBC # BLD: 0 /100 WBCS — SIGNIFICANT CHANGE UP (ref 0–0)
PLATELET # BLD AUTO: 217 K/UL — SIGNIFICANT CHANGE UP (ref 150–400)
POTASSIUM SERPL-MCNC: 4.1 MMOL/L — SIGNIFICANT CHANGE UP (ref 3.5–5.3)
POTASSIUM SERPL-SCNC: 4.1 MMOL/L — SIGNIFICANT CHANGE UP (ref 3.5–5.3)
PROCALCITONIN SERPL-MCNC: 0.1 NG/ML — HIGH
PROT SERPL-MCNC: 5.6 G/DL — LOW (ref 6–8.3)
PROT SERPL-MCNC: 5.9 G/DL — LOW (ref 6–8.3)
RBC # BLD: 3.3 M/UL — LOW (ref 4.2–5.8)
RBC # FLD: 13 % — SIGNIFICANT CHANGE UP (ref 10.3–14.5)
SARS-COV-2 RNA SPEC QL NAA+PROBE: DETECTED
SODIUM SERPL-SCNC: 143 MMOL/L — SIGNIFICANT CHANGE UP (ref 135–145)
WBC # BLD: 5.31 K/UL — SIGNIFICANT CHANGE UP (ref 3.8–10.5)
WBC # FLD AUTO: 5.31 K/UL — SIGNIFICANT CHANGE UP (ref 3.8–10.5)

## 2022-10-15 PROCEDURE — 99233 SBSQ HOSP IP/OBS HIGH 50: CPT | Mod: GC

## 2022-10-15 PROCEDURE — 99221 1ST HOSP IP/OBS SF/LOW 40: CPT

## 2022-10-15 RX ORDER — REMDESIVIR 5 MG/ML
200 INJECTION INTRAVENOUS EVERY 24 HOURS
Refills: 0 | Status: COMPLETED | OUTPATIENT
Start: 2022-10-15 | End: 2022-10-15

## 2022-10-15 RX ORDER — ACETAMINOPHEN 500 MG
650 TABLET ORAL EVERY 6 HOURS
Refills: 0 | Status: DISCONTINUED | OUTPATIENT
Start: 2022-10-15 | End: 2022-10-18

## 2022-10-15 RX ORDER — REMDESIVIR 5 MG/ML
100 INJECTION INTRAVENOUS EVERY 24 HOURS
Refills: 0 | Status: DISCONTINUED | OUTPATIENT
Start: 2022-10-16 | End: 2022-10-18

## 2022-10-15 RX ORDER — REMDESIVIR 5 MG/ML
INJECTION INTRAVENOUS
Refills: 0 | Status: DISCONTINUED | OUTPATIENT
Start: 2022-10-15 | End: 2022-10-18

## 2022-10-15 RX ORDER — SIMVASTATIN 20 MG/1
20 TABLET, FILM COATED ORAL AT BEDTIME
Refills: 0 | Status: DISCONTINUED | OUTPATIENT
Start: 2022-10-15 | End: 2022-10-15

## 2022-10-15 RX ORDER — POLYETHYLENE GLYCOL 3350 17 G/17G
17 POWDER, FOR SOLUTION ORAL DAILY
Refills: 0 | Status: DISCONTINUED | OUTPATIENT
Start: 2022-10-15 | End: 2022-10-18

## 2022-10-15 RX ORDER — INFLUENZA VIRUS VACCINE 15; 15; 15; 15 UG/.5ML; UG/.5ML; UG/.5ML; UG/.5ML
0.7 SUSPENSION INTRAMUSCULAR ONCE
Refills: 0 | Status: DISCONTINUED | OUTPATIENT
Start: 2022-10-15 | End: 2022-10-18

## 2022-10-15 RX ORDER — LANOLIN ALCOHOL/MO/W.PET/CERES
3 CREAM (GRAM) TOPICAL AT BEDTIME
Refills: 0 | Status: DISCONTINUED | OUTPATIENT
Start: 2022-10-15 | End: 2022-10-18

## 2022-10-15 RX ORDER — DEXAMETHASONE 0.5 MG/5ML
6 ELIXIR ORAL DAILY
Refills: 0 | Status: DISCONTINUED | OUTPATIENT
Start: 2022-10-15 | End: 2022-10-15

## 2022-10-15 RX ORDER — METOPROLOL TARTRATE 50 MG
25 TABLET ORAL
Refills: 0 | Status: DISCONTINUED | OUTPATIENT
Start: 2022-10-15 | End: 2022-10-18

## 2022-10-15 RX ORDER — FINASTERIDE 5 MG/1
5 TABLET, FILM COATED ORAL DAILY
Refills: 0 | Status: DISCONTINUED | OUTPATIENT
Start: 2022-10-15 | End: 2022-10-18

## 2022-10-15 RX ORDER — SENNA PLUS 8.6 MG/1
2 TABLET ORAL AT BEDTIME
Refills: 0 | Status: DISCONTINUED | OUTPATIENT
Start: 2022-10-15 | End: 2022-10-18

## 2022-10-15 RX ORDER — SODIUM CHLORIDE 9 MG/ML
1000 INJECTION INTRAMUSCULAR; INTRAVENOUS; SUBCUTANEOUS
Refills: 0 | Status: DISCONTINUED | OUTPATIENT
Start: 2022-10-15 | End: 2022-10-18

## 2022-10-15 RX ADMIN — Medication 25 MILLIGRAM(S): at 06:48

## 2022-10-15 RX ADMIN — REMDESIVIR 500 MILLIGRAM(S): 5 INJECTION INTRAVENOUS at 03:15

## 2022-10-15 RX ADMIN — APIXABAN 5 MILLIGRAM(S): 2.5 TABLET, FILM COATED ORAL at 17:01

## 2022-10-15 RX ADMIN — FINASTERIDE 5 MILLIGRAM(S): 5 TABLET, FILM COATED ORAL at 21:30

## 2022-10-15 RX ADMIN — APIXABAN 5 MILLIGRAM(S): 2.5 TABLET, FILM COATED ORAL at 03:43

## 2022-10-15 RX ADMIN — SENNA PLUS 2 TABLET(S): 8.6 TABLET ORAL at 21:29

## 2022-10-15 RX ADMIN — Medication 25 MILLIGRAM(S): at 17:01

## 2022-10-15 RX ADMIN — Medication 6 MILLIGRAM(S): at 06:51

## 2022-10-15 RX ADMIN — POLYETHYLENE GLYCOL 3350 17 GRAM(S): 17 POWDER, FOR SOLUTION ORAL at 12:17

## 2022-10-15 RX ADMIN — SODIUM CHLORIDE 50 MILLILITER(S): 9 INJECTION INTRAMUSCULAR; INTRAVENOUS; SUBCUTANEOUS at 02:25

## 2022-10-15 NOTE — PROGRESS NOTE ADULT - ASSESSMENT
86 yo M with PMHx of HLD, BPH, dementia, recent admission for PE c/b new onset afib with rvr presents to the ED with hemoptysis and weakness. Obtained collateral history from wife Lima ([phone]). Patient is AAOx2,reports he is feeling fine, mild cough with fever. Per wife, he has this intermittent cough for past 1 week and Patient was admitted on 10/7/22 for b/l PE p/w weakness, fever. RVP PCR : COVID positive. Admitted for Covid and Rehab placement  88yo M with PMHx of HLD, BPH, dementia, recent admission for submassive PE c/b new onset afib with RVR presents to the ED with hemoptysis and weakness a/w COVID infection and hemoptysis on Eliquis.

## 2022-10-15 NOTE — PHYSICAL THERAPY INITIAL EVALUATION ADULT - ADDITIONAL COMMENTS
Patient lives in apartment with spouse, 0 PERRY or inside.  Patient reports he was independent in all ADLs and ambulated independently without device.

## 2022-10-15 NOTE — DISCHARGE NOTE PROVIDER - NSDCCPCAREPLAN_GEN_ALL_CORE_FT
PRINCIPAL DISCHARGE DIAGNOSIS  Diagnosis: 2019 novel coronavirus disease (COVID-19)  Assessment and Plan of Treatment: You came to us with cough and weakness and were found to have contracted COVID. We treated you with remdesivir and you received steroids as well. You were followed by infectious disease specialist debraout your stay.  Follow up with your primary care doctor within 1 week of discharge from [facility]**        SECONDARY DISCHARGE DIAGNOSES  Diagnosis: Weakness  Assessment and Plan of Treatment: You have significant weakness, attributable to multiple medical issues. You are being discharged to sub acute-rehab facility where you can continue to regain your strength.    Diagnosis: Pulmonary embolism  Assessment and Plan of Treatment: During your last hospitalization, you were diagnosed with a pulmonary embolism. In your case, you likely had a  deep vein thrombosis (DVT) which is a blood clot in a large vein deep in a leg, arm, or elsewhere in the body. The clot can separate from the vein, travel to the lungs and cut off blood flow. This is a pulmonary embolism (PE). Pulmonary embolism is very serious. Both the prevention and the treatment are similar for DVT and PE.   To help prevent more blood clots from formingp please continue take your Eliquis exactly as instructed. Don’t skip doses. You have been prescribed a medication to thin the blood, so that more clots do not form.   Make sure you stay active and try to walk as much as possible.  When sitting for long periods of time, move your knees, ankles, feet, and toes.   Stay at a healthy weight. Try to exercise at least 30 minutes on most days. Before starting an exercise program, talk with your primary care provider. When traveling by car, make frequent stops to get up and move around. On long airplane rides, get up and move around when possible. If you can’t get up, wiggle your toes, move your ankles and tighten your calves to keep your blood moving.  Seek immediate medical care if you have pain, swelling, and redness in your leg, arm, or other body area. These symptoms may mean another blood clot. Also call your healthcare provider if you have signs and symptoms of bleeding, like blood in your urine, bleeding with bowel movements, or bleeding from the nose, gums, a cut, or vagina. Call 911 if you have symptoms of a blood clot in the lungs including: chest pain, trouble breathing, coughing blood, fast heartbeat, heavy or uncontrolled bleeding.      Diagnosis: JUAN (acute kidney injury)  Assessment and Plan of Treatment: You had a mild acute kidney injury during this admission, likely from dehydration. This corrected with the administration of fluids    Diagnosis: Atrial fibrillation  Assessment and Plan of Treatment: You have a known diagnosis of atrial fibrillation prior to your admission. This condition, if not treated, increases your risk of stroke or heart attack.   CONTINUE TO TAKE Eliquis  TAKE Metoprolol 25mg twice a day**  Please make sure to continue taking these medications to avoid developing blood clots and to lower your risk of stroke. Additionally, please follow up with your PCP (and/or cardiologist) on a regular basis to ensure that this condition does not require changes to the dose or type of medication.      Diagnosis: Anemia  Assessment and Plan of Treatment: You have a known history of mild, chronic anemia.  Continue to follow up with your PCP on a regular basis for blood work.    Diagnosis: Dementia  Assessment and Plan of Treatment: Continue home rivastigmine patch    Diagnosis: HLD (hyperlipidemia)  Assessment and Plan of Treatment: Continue your home simvastatin    Diagnosis: History of BPH  Assessment and Plan of Treatment: A CT scan of your abdomen and pelvis was performed during your last admission and demonstrated an enlarged prostate.   You can follow up with your primary care doctor for Prostate Specific Antigen testing and other workup to exclude underlying malignancy.     PRINCIPAL DISCHARGE DIAGNOSIS  Diagnosis: 2019 novel coronavirus disease (COVID-19)  Assessment and Plan of Treatment: You came to us with cough and weakness and were found to have contracted COVID. We treated you with remdesivir and you received steroids as well. You were followed by infectious disease specialist debraout your stay.  Follow up with your primary care doctor within 1 week of discharge from         SECONDARY DISCHARGE DIAGNOSES  Diagnosis: Weakness  Assessment and Plan of Treatment: You have significant weakness, attributable to multiple medical issues. You are being discharged to sub acute-rehab facility where you can continue to regain your strength.    Diagnosis: Pulmonary embolism  Assessment and Plan of Treatment: During your last hospitalization, you were diagnosed with a pulmonary embolism. In your case, you likely had a  deep vein thrombosis (DVT) which is a blood clot in a large vein deep in a leg, arm, or elsewhere in the body. The clot can separate from the vein, travel to the lungs and cut off blood flow. This is a pulmonary embolism (PE). Pulmonary embolism is very serious. Both the prevention and the treatment are similar for DVT and PE.   To help prevent more blood clots from forming please continue take your Eliquis exactly as instructed. Don’t skip doses. You have been prescribed a medication to thin the blood, so that more clots do not form.   Make sure you stay active and try to walk as much as possible.  When sitting for long periods of time, move your knees, ankles, feet, and toes.   Stay at a healthy weight. Try to exercise at least 30 minutes on most days. Before starting an exercise program, talk with your primary care provider. When traveling by car, make frequent stops to get up and move around. On long airplane rides, get up and move around when possible. If you can’t get up, wiggle your toes, move your ankles and tighten your calves to keep your blood moving.  Seek immediate medical care if you have pain, swelling, and redness in your leg, arm, or other body area. These symptoms may mean another blood clot. Also call your healthcare provider if you have signs and symptoms of bleeding, like blood in your urine, bleeding with bowel movements, or bleeding from the nose, gums, a cut, or vagina. Call 911 if you have symptoms of a blood clot in the lungs including: chest pain, trouble breathing, coughing blood, fast heartbeat, heavy or uncontrolled bleeding.      Diagnosis: JUAN (acute kidney injury)  Assessment and Plan of Treatment: You had a mild acute kidney injury during this admission, likely from dehydration. This corrected with the administration of fluids    Diagnosis: Atrial fibrillation  Assessment and Plan of Treatment: You have a known diagnosis of atrial fibrillation prior to your admission. This condition, if not treated, increases your risk of stroke or heart attack.   CONTINUE TO TAKE Eliquis  TAKE Metoprolol 25mg twice a day  Please make sure to continue taking these medications to avoid developing blood clots and to lower your risk of stroke. Additionally, please follow up with your PCP (and/or cardiologist) on a regular basis to ensure that this condition does not require changes to the dose or type of medication.      Diagnosis: Anemia  Assessment and Plan of Treatment: You have a known history of mild, chronic anemia.  Continue to follow up with your PCP on a regular basis for blood work.    Diagnosis: Dementia  Assessment and Plan of Treatment: Continue home rivastigmine patch    Diagnosis: HLD (hyperlipidemia)  Assessment and Plan of Treatment: Continue your home simvastatin    Diagnosis: History of BPH  Assessment and Plan of Treatment: A CT scan of your abdomen and pelvis was performed during your last admission and demonstrated an enlarged prostate.   You can follow up with your primary care doctor for Prostate Specific Antigen testing and other workup to exclude underlying malignancy.     PRINCIPAL DISCHARGE DIAGNOSIS  Diagnosis: 2019 novel coronavirus disease (COVID-19)  Assessment and Plan of Treatment: You came to us with cough and weakness and were found to have contracted COVID. We treated you with Remdesivir and you were followed by infectious disease specialist throughout your stay.  You have improved.  Follow up with your primary care doctor within 1 week of discharge from   If you have new shortness of breath, chest pain, fever, uncontrolled shivers or other symptoms that concern you, follow up with your PMD.         SECONDARY DISCHARGE DIAGNOSES  Diagnosis: Pulmonary embolism  Assessment and Plan of Treatment: During your last hospitalization, you were diagnosed with pulmonary embolisms (clots in the lungs).  To help prevent more blood clots from forming please continue to take your Eliquis exactly as instructed. Don’t skip doses. You have been prescribed a medication to thin the blood, so that more clots do not form.   Make sure you stay active and try to walk as much as possible.  When sitting for long periods of time, move your knees, ankles, feet, and toes.   When traveling by car, make frequent stops to get up and move around. On long airplane rides, get up and move around when possible. If you can’t get up, wiggle your toes, move your ankles and tighten your calves to keep your blood moving.  Seek immediate medical care if you have pain, swelling, and redness in your leg, arm, or other body area. These symptoms may mean another blood clot. Also call your healthcare provider if you have signs and symptoms of bleeding, like blood in your urine, bleeding with bowel movements, or bleeding from the nose, gums, a cut, or vagina. Call 911 if you have symptoms of a blood clot in the lungs including: chest pain, trouble breathing, coughing blood, fast heartbeat, heavy or uncontrolled bleeding.      Diagnosis: JUAN (acute kidney injury)  Assessment and Plan of Treatment: You had a mild acute kidney injury during this admission, likely from dehydration. This corrected with the administration of fluids    Diagnosis: Atrial fibrillation  Assessment and Plan of Treatment: You have a known diagnosis of atrial fibrillation prior to your admission. This condition, if not treated, increases your risk of stroke or heart attack.   CONTINUE TO TAKE Eliquis  TAKE Metoprolol 25mg twice a day  Please make sure to continue taking these medications to avoid developing blood clots and to lower your risk of stroke. Additionally, please follow up with your PCP (and/or cardiologist) on a regular basis to ensure that this condition does not require changes to the dose or type of medication.      Diagnosis: Anemia  Assessment and Plan of Treatment: You have a known history of mild, chronic anemia.  Continue to follow up with your PCP on a regular basis for blood work.    Diagnosis: Transaminitis  Assessment and Plan of Treatment: Your liver function tests are mildly elevated. This was present on the last admission and they have improved somewhat since then, but not normalized yet.   This may have been related to the significant clots you had in your lungs and back up of blood into the liver and may now be in part due to the COVID infection.  STOP taking your simvastatin for now. Recheck your liver function tests with your PMD or in the rehab in a week.   Your current AST/ALT are 86/129. Your alkaline phosphatase and total bilirubin are normal.

## 2022-10-15 NOTE — PROGRESS NOTE ADULT - PROBLEM SELECTOR PLAN 2
In the setting of known PE on AC  - cleared by pulm to continue AC  - pulm Dr. Orozco consulted by ER In the setting of known PE on AC  - cleared by pulm to continue AC  - no significant hemoptysis during this hospitalization however will continue to monitor  - pulm Dr. Orozco consulted by ER - pt a/w hemoptysis In the setting of known PE on AC  - no significant hemoptysis during this hospitalization, however, will continue to monitor closely  - f/up pulm, Dr. Orozco consult

## 2022-10-15 NOTE — DISCHARGE NOTE PROVIDER - HOSPITAL COURSE
ADMISSION DATE:  10-14-22    ---  FROM ADMISSION H+P:   HPI:  86 yo M with PMHx of HLD, BPH and dementia presents to the ED with hemoptysis and weakness. Obtained collateral history from wife Lima ([phone]). Patient is AAOx2,reports he is feeling fine, mild cough with fever. Per wife, he has this intermittent cough for past 1 week and Patient was admitted on 10/7/22 for b/l PE, started on heparin gtt for b/l submassive PE. LE Dopplers negative for DVT b/l. Transitioned from heparin gtt to Eliquis. CT with evidence of RV strain, TTE performed showed EF of 65%. Patient with new onset Afib with RVR in ICU as well, started on metoprolol 25mg BID for rate control. Patient was sent home with Eliquis starter pack 10mg BID( DAY 6) on discharge, now transition to 5mg BID.    Per wife, patient has been very weak unable to get out of bed despite having a walker unable to walk around the house.  Patient has pulmonary appointment today and was able unable to get him into the office.  Patient wife also states patient has been more confused at times.  No falls or trauma. Reports coughing up blood a few times with clot once, with decrease appetite, not drinking and eating enough. Denies sick contacts.    GOC discussed with wife : HCP   WIFE LIMA [phone]--> DNR/DNI     IN THE ED,  VITALS: /65 RR 16 HR 80 temp 101.5 f rectal , on 2 litre NC sat >90%  PERTINENT LABS: H/H 11.7/35.7 BUN/Cr: 26/1.2, AST 77,  ALBUMIN 2.9   UA: negative for UTI   RVP PCR: POSITIVE COVID  CXR;  negative - No pneumothorax, No opacities, No free air.  Previous CT Angio last admission showed concern for developing PNA  EKG; NSR with sinus arrhthymias QT/QTc 412/478 (14 Oct 2022 23:53)      ---  HOSPITAL COURSE/PERTINENT LABS/PROCEDURES PERFORMED/PENDING TESTS: Patient was admitted for further treatment. MARK Farooq was consulted. Patient was started on remdesivir and dexamethasone. Inflammatory biomarkers were elevated. Patient did not have any further episodes of significant hemoptysis during this hospitalization. Elliquis was continued. Had mild JUAN which resolved w/ fluids. Physical therapy assessed the pt and deemed it appropriate that he eventually be discharged to [facility].    ---  PATIENT CONDITION:  - stable    ---  PHYSICAL EXAM ON DAY OF DISCHARGE:    ---  CONSULTANTS:     ---  ADVANCED CARE PLANNING:  - Code status:      - MOLST completed:      [  ] NO     [  ] YES    ---  TIME SPENT:  I, the attending physician, was physically present for the key portions of the evaluation and management (E/M) service provided. The total amount of time spent reviewing the hospital notes, laboratory values, imaging findings, assessing/counseling the patient, discussing with consultant physicians, social work, nursing staff was -- minutes ADMISSION DATE:  10-14-22    ---  FROM ADMISSION H+P:   HPI:  88 yo M with PMHx of HLD, BPH and dementia presents to the ED with hemoptysis and weakness. Obtained collateral history from wife Aleksandr ([phone]). Patient is AAOx2,reports he is feeling fine, mild cough with fever. Per wife, he has this intermittent cough for past 1 week and Patient was admitted on 10/7/22 for b/l PE, started on heparin gtt for b/l submassive PE. LE Dopplers negative for DVT b/l. Transitioned from heparin gtt to Eliquis. CT with evidence of RV strain, TTE performed showed EF of 65%. Patient with new onset Afib with RVR in ICU as well, started on metoprolol 25mg BID for rate control. Patient was sent home with Eliquis starter pack 10mg BID( DAY 6) on discharge, now transition to 5mg BID.    Per wife, patient has been very weak unable to get out of bed despite having a walker unable to walk around the house.  Patient has pulmonary appointment today and was able unable to get him into the office.  Patient wife also states patient has been more confused at times.  No falls or trauma. Reports coughing up blood a few times with clot once, with decrease appetite, not drinking and eating enough. Denies sick contacts.    GOC discussed with wife : HCP   WIFE ALEKSANDR [phone]--> DNR/DNI     IN THE ED,  VITALS: /65 RR 16 HR 80 temp 101.5 f rectal , on 2 litre NC sat >90%  PERTINENT LABS: H/H 11.7/35.7 BUN/Cr: 26/1.2, AST 77,  ALBUMIN 2.9   UA: negative for UTI   RVP PCR: POSITIVE COVID  CXR;  negative - No pneumothorax, No opacities, No free air.  Previous CT Angio last admission showed concern for developing PNA  EKG; NSR with sinus arrhthymias QT/QTc 412/478 (14 Oct 2022 23:53)      ---  HOSPITAL COURSE/PERTINENT LABS/PROCEDURES PERFORMED/PENDING TESTS: Patient was admitted for further treatment. MARK Farooq was consulted. Patient was started on remdesivir and dexamethasone. Inflammatory biomarkers were elevated. Patient did not have any further episodes of significant hemoptysis during this hospitalization. Blood cultures showed no growth to date, urine culture demonstrated normal urogenital starla. Elliquis was continued. Had mild JUAN which resolved w/ fluids. Physical therapy assessed the pt and deemed it appropriate that he eventually be discharged to [facility].    ---  PATIENT CONDITION:  - stable    ---  PHYSICAL EXAM ON DAY OF DISCHARGE:  T(C): 36.6 (10-18-22 @ 05:05), Max: 37.4 (10-17-22 @ 20:08)  HR: 64 (10-18-22 @ 05:05) (64 - 80)  BP: 128/77 (10-18-22 @ 05:05) (128/72 - 129/82)  RR: 18 (10-18-22 @ 05:05) (18 - 18)  SpO2: 95% (10-18-22 @ 05:05) (94% - 95%)    PHYSICAL EXAM:  GENERAL: NAD, lying comfortably in bed  HEENT:  anicteric, moist mucous membranes  CHEST/LUNG:  coughing intermittently with deep breaths, decreased breath sounds, overall CTA b/l, no rales, wheezes, or rhonchi  HEART:  RRR, S1, S2  ABDOMEN:  BS+, soft, nontender, nondistended  EXTREMITIES: no edema, cyanosis, or calf tenderness  NERVOUS SYSTEM: answers simple questions and follows commands appropriately  ---  CONSULTANTS:   ID  ---  ADVANCED CARE PLANNING:  - Code status:      - MOLST completed:      [  ] NO     [  ] YES    ---  TIME SPENT:  I, the attending physician, was physically present for the key portions of the evaluation and management (E/M) service provided. The total amount of time spent reviewing the hospital notes, laboratory values, imaging findings, assessing/counseling the patient, discussing with consultant physicians, social work, nursing staff was -- minutes ADMISSION DATE:  10-14-22    ---  FROM ADMISSION H+P:   HPI:  86 yo M with PMHx of HLD, BPH and dementia presents to the ED with hemoptysis and weakness. Obtained collateral history from wife Aleksandr ([phone]). Patient is AAOx2,reports he is feeling fine, mild cough with fever. Per wife, he has this intermittent cough for past 1 week and Patient was admitted on 10/7/22 for b/l PE, started on heparin gtt for b/l submassive PE. LE Dopplers negative for DVT b/l. Transitioned from heparin gtt to Eliquis. CT with evidence of RV strain, TTE performed showed EF of 65%. Patient with new onset Afib with RVR in ICU as well, started on metoprolol 25mg BID for rate control. Patient was sent home with Eliquis starter pack 10mg BID( DAY 6) on discharge, now transition to 5mg BID.    Per wife, patient has been very weak unable to get out of bed despite having a walker unable to walk around the house.  Patient has pulmonary appointment today and was able unable to get him into the office.  Patient wife also states patient has been more confused at times.  No falls or trauma. Reports coughing up blood a few times with clot once, with decrease appetite, not drinking and eating enough. Denies sick contacts.    GOC discussed with wife : HCP   WIFE ALEKSANDR [phone]--> DNR/DNI     IN THE ED,  VITALS: /65 RR 16 HR 80 temp 101.5 f rectal , on 2 litre NC sat >90%  PERTINENT LABS: H/H 11.7/35.7 BUN/Cr: 26/1.2, AST 77,  ALBUMIN 2.9   UA: negative for UTI   RVP PCR: POSITIVE COVID  CXR;  negative - No pneumothorax, No opacities, No free air.  Previous CT Angio last admission showed concern for developing PNA  EKG; NSR with sinus arrhthymias QT/QTc 412/478 (14 Oct 2022 23:53)      ---  HOSPITAL COURSE/PERTINENT LABS/PROCEDURES PERFORMED/PENDING TESTS: Patient was admitted for further treatment. ID Dr. Oseas bingham was consulted. Patient was started on remdesivir. Pt had reported borderline hypoxia on admission and was given a dose of decadron, but per ID no indication to continue as pt's oxygenation was 94% on RA on exertion. Patient did not have any episodes of hemoptysis during the hospitalization. Blood cultures showed no growth to date, urine culture demonstrated normal urogenital starla. Elliquis was continued. Pt had mild JUAN which resolved w/ fluids. Pt had mild transaminitis that had downtrended from prior admission but still with elevated AST/ALT so pt's zocor was held. Physical therapy assessed the pt and recommended discharge to [facility]. Pt felt well and was discharged to [facility].     ---  PATIENT CONDITION:  - stable    ---  ON DAY OF DISCHARGE:  ROS: admits occasional cough. Denies fever, chills, SOB, hemoptysis, abd pain, n/v.   Vital Signs Last 24 Hrs  T(C): 36.6 (18 Oct 2022 05:05), Max: 37.4 (17 Oct 2022 20:08)  T(F): 97.9 (18 Oct 2022 05:05), Max: 99.4 (17 Oct 2022 20:08)  HR: 64 (18 Oct 2022 05:05) (64 - 80)  BP: 128/77 (18 Oct 2022 05:05) (128/72 - 129/82)  BP(mean): --  RR: 18 (18 Oct 2022 05:05) (18 - 18)  SpO2: 95% (18 Oct 2022 05:05) (94% - 95%)    Parameters below as of 18 Oct 2022 05:05  Patient On (Oxygen Delivery Method): room air        PHYSICAL EXAM:  GENERAL: NAD, lying comfortably in bed  HEENT:  anicteric, moist mucous membranes  CHEST/LUNG:  coughing intermittently with deep breaths, decreased breath sounds, overall CTA b/l, no rales, wheezes, or rhonchi  HEART:  RRR, S1, S2  ABDOMEN:  BS+, soft, nontender, nondistended  EXTREMITIES: no edema, cyanosis, or calf tenderness  NERVOUS SYSTEM: answers simple questions and follows commands appropriately    ---  TIME SPENT: 40min

## 2022-10-15 NOTE — PROGRESS NOTE ADULT - PROBLEM SELECTOR PLAN 7
- H/H 11.7/35.7 baseline 11-12, chronic anemia   - Monitor for signs/symptoms of bleeding  - Monitor daily CBC. - AST/ALT elevation 68/122 -- overall improving from last admission  - possibly pt had congestive hepatopathy in setting of acute submassive PE with RV strain and has been improving since being on A/C  - monitor closely  - consider GI consult if worsening  - hold zocor for now

## 2022-10-15 NOTE — CONSULT NOTE ADULT - ASSESSMENT
86 yo M with PMHx of HLD, BPH and dementia, recent admission for bilateral PE, who presented with hemoptysis and weakness. Found to have fever likely 2/2 COVID, although cultures are also in progress. He has no leukocytosis and serum procalcitonin is 0.10. CRP 36 and he is on room air. CXR reviewed and showed no focal infiltrate. Urinalysis did not appear infected.    -continue remdesivir to complete at least 3 days if remains on room air, monitor LFT's  -AC Per primary team  -follow blood and urine cultures    Thank you for courtesy of this consult.     Discussed with the primary team.     Tosha Rubio MD  Division of Infectious Diseases   Cell [phone] between 8am and 6pm   After 6pm and weekends please call ID service at [phone].

## 2022-10-15 NOTE — PROGRESS NOTE ADULT - PROBLEM SELECTOR PLAN 10
- Chronic   - continue home Simvastatin 20 mg QD. - Chronic   - hold Simvastatin given mild transaminitis

## 2022-10-15 NOTE — PROGRESS NOTE ADULT - PROBLEM SELECTOR PLAN 8
- Chronic   - Pt is on home Rivastigmine patch QD. - H/H 11.7/35.7 on admission ; baseline 11-12, chronic anemia   - Monitor for signs/symptoms of bleeding  - Monitor daily CBC.

## 2022-10-15 NOTE — PROGRESS NOTE ADULT - PROBLEM SELECTOR PLAN 11
Eliquis 5mg BID for PE/afib Patient states he has not had a bowel movement in 2 weeks however states this is normal for him. On further questioning he states he may be misremembering. Denies constipation. On exam, abdomen is soft/nontender/nondistended.  - will start bowel regimen with senna/miralax. Patient states he has not had a bowel movement in 2 weeks however states this is normal for him. On further questioning he states he may be misremembering. Denies constipation. On exam, abdomen is soft/nontender/nondistended.  - will start bowel regimen with senna/miralax.    #Dementia:  - Chronic   - Pt is on home Rivastigmine patch QD.

## 2022-10-15 NOTE — PROGRESS NOTE ADULT - PROBLEM SELECTOR PLAN 9
Chronic , on home Finasteride 5 mg QD  - on last admission CT A/P: Heterogeneously enlarged prostate, cause mass effect and protrusion at the bladder base. - Would recommend to correlate with PSA and pt may follow up outpt with workup to exclude underlying prostatic malignancy. Chronic, on home Finasteride 5 mg QD  - on last admission CT A/P: Heterogeneously enlarged prostate, cause mass effect and protrusion at the bladder base.   - recommended to follow up outpt with workup to exclude underlying prostatic malignancy.

## 2022-10-15 NOTE — DISCHARGE NOTE PROVIDER - DETAILS OF MALNUTRITION DIAGNOSIS/DIAGNOSES
This patient has been assessed with a concern for Malnutrition and was treated during this hospitalization for the following Nutrition diagnosis/diagnoses:     -  10/16/2022: Severe protein-calorie malnutrition

## 2022-10-15 NOTE — PROGRESS NOTE ADULT - PROBLEM SELECTOR PLAN 4
- patient presented with weakness 2/2 decrease PO intake and covid   - ALBUMIN 2.9  - Encourage PO intake   - s/p 1 litre NS  - maintenance  fluid 50cc/hr for 12 hrs also in the setting of increase cr   - TTE performed last admission, showed EF of 65%.   - aspiration  precautions  - fall risk protocol  - PT/OT consult  - Nutrition consult - patient presented with weakness 2/2 decrease PO intake and COVID   - ALBUMIN 2.9  - Encourage PO intake   - s/p 1 litre NS  - maintenance  fluid 50cc/hr for 12 hrs also in the setting of increase cr   - TTE performed last admission, showed EF of 65%.   - aspiration  precautions  - fall risk protocol  - PT/OT consult  - Nutrition consult

## 2022-10-15 NOTE — CONSULT NOTE ADULT - SUBJECTIVE AND OBJECTIVE BOX
[facility]  INFECTIOUS DISEASES - Tonya Farooq, [address], [place] [address]  Tel: [phone]     Fax: [phone]  =======================================================    [facility]-[id number]  SUSIE REEDER     CC: Patient is a 87y old  Male who presents with a chief complaint of weakness, COVID positive (15 Oct 2022 13:26)    HPI:  86 yo M with PMHx of HLD, BPH and dementia, who presented with hemoptysis and weakness. Patient answering simple questions and reports he is feeling fine. He says he has occasional cough but denies any pain, SOB, fever or chills. Per records patient also has been more weak and confused recently. He was recently admitted on 10/7/22 for b/l PE. In the ED he had a fever of 101.5.    PAST MEDICAL & SURGICAL HISTORY:  HLD (hyperlipidemia)      Enlarged prostate      Dementia      Pulmonary embolism      Atrial fibrillation with RVR      H/O hernia repair          Social Hx:     FAMILY HISTORY:  No pertinent family history in first degree relatives        Allergies    No Known Allergies    Intolerances        Antibiotics:  MEDICATIONS  (STANDING):  apixaban 5 milliGRAM(s) Oral every 12 hours  finasteride 5 milliGRAM(s) Oral daily  influenza  Vaccine (HIGH DOSE) 0.7 milliLiter(s) IntraMuscular once  metoprolol tartrate 25 milliGRAM(s) Oral two times a day  polyethylene glycol 3350 17 Gram(s) Oral daily  remdesivir  IVPB   IV Intermittent   senna 2 Tablet(s) Oral at bedtime  sodium chloride 0.9%. 1000 milliLiter(s) (50 mL/Hr) IV Continuous <Continuous>    MEDICATIONS  (PRN):  acetaminophen     Tablet .. 650 milliGRAM(s) Oral every 6 hours PRN Temp greater or equal to 38C (100.4F), Mild Pain (1 - 3)  guaiFENesin Oral Liquid (Sugar-Free) 200 milliGRAM(s) Oral every 6 hours PRN Cough  melatonin 3 milliGRAM(s) Oral at bedtime PRN Insomnia       REVIEW OF SYSTEMS: *limited 2/2 dementia*  CONSTITUTIONAL:  (+) fevers  HEENT: No sore throat or runny nose.  CARDIOVASCULAR:  No chest pain or SOB.  RESPIRATORY:  see history.  GASTROINTESTINAL:  No nausea, vomiting or diarrhea. no abdominal pain  NEUROLOGIC:  No headache    Physical Exam:  Vital Signs Last 24 Hrs  T(C): 37.4 (15 Oct 2022 10:56), Max: 38.6 (14 Oct 2022 20:24)  T(F): 99.4 (15 Oct 2022 10:56), Max: 101.5 (14 Oct 2022 20:24)  HR: 81 (15 Oct 2022 10:56) (67 - 88)  BP: 147/73 (15 Oct 2022 10:56) (120/65 - 147/73)  BP(mean): --  RR: 14 (15 Oct 2022 10:56) (14 - 18)  SpO2: 94% (15 Oct 2022 10:56) (92% - 98%)    Parameters below as of 15 Oct 2022 10:56  Patient On (Oxygen Delivery Method): room air      Height (cm): 182.9 (10-14 @ 16:18)  Weight (kg): 90.7 (10-14 @ 16:18)  BMI (kg/m2): 27.1 (10-14 @ 16:18)  BSA (m2): 2.13 (10-14 @ 16:18)  GEN: NAD  HEENT: normocephalic and atraumatic.   NECK: Supple.    LUNGS: Normal respiratory effort  HEART: Regular rate and rhythm   ABDOMEN: Soft, nontender, and nondistended.    EXTREMITIES: No leg edema.  NEUROLOGIC: answering simple questions, knows he's in the hospital and year is       Labs:  10-15    143  |  112<H>  |  21  ----------------------------<  97  4.1   |  24  |  0.84    Ca    7.7<L>      15 Oct 2022 04:39    TPro  5.9<L>  /  Alb  2.3<L>  /  TBili  0.5  /  DBili  x   /  AST  68<H>  /  ALT  122<H>  /  AlkPhos  61  10-15                          9.8    5.31  )-----------( 217      ( 15 Oct 2022 04:39 )             30.4     PT/INR - ( 14 Oct 2022 17:50 )   PT: 27.6 sec;   INR: 2.34 ratio         PTT - ( 14 Oct 2022 17:50 )  PTT:45.3 sec  Urinalysis Basic - ( 14 Oct 2022 22:15 )    Color: Yellow / Appearance: Clear / S.025 / pH: x  Gluc: x / Ketone: Trace  / Bili: Negative / Urobili: 1   Blood: x / Protein: 15 / Nitrite: Negative   Leuk Esterase: Negative / RBC: 0-2 /HPF / WBC 0-2   Sq Epi: x / Non Sq Epi: Occasional / Bacteria: Occasional      LIVER FUNCTIONS - ( 15 Oct 2022 04:39 )  Alb: 2.3 g/dL / Pro: 5.9 g/dL / ALK PHOS: 61 U/L / ALT: 122 U/L / AST: 68 U/L / GGT: x                 Procalcitonin, Serum: 0.10 ng/mL (10-15-22 @ 04:39)    C-Reactive Protein, Serum: 36 mg/L (10-15-22 @ 04:39)      COVID-19 PCR: Detected (10-14-22 @ 17:40)  SARS-CoV-2: Detected (10-14-22 @ 17:40)  SARS-CoV-2 Result: NotDetec (10-06-22 @ 19:25)      RECENT CULTURES:  10-14 @ 17:40          Detected  10-07 @ 02:00 .Blood Blood-Peripheral     No Growth Final        10-07 @ 01:50 .Blood Blood-Peripheral     No Growth Final              All imaging and other data have been reviewed.

## 2022-10-15 NOTE — PROGRESS NOTE ADULT - PROBLEM SELECTOR PLAN 1
- Pt presenting with non-specific symptoms of generalized weakness and confusion, with mild URI symptoms, cough with fever , found to be COVID positive  - was reportedly hypoxic <89% on RA, on 2litre NC   - CXR;  negative - No pneumothorax, No opacities, No free air.  - Given Tylenol and IV fluid 1litre NS in ER   - Will start dexamethasone 6mg PO for 10days and remdesivir  5 days   - Continue with supportive care (Tylenol, Robitussin)  - Continuos remote pulse ox monitoring  - procal .1, D-Dimer 798, , CRP 36, Ferritin 822 levels in AM  - PT/OT/SW consulted for generalized weakness, impaired ambulation and ADLs--> PT recommends rehab.  - ID consulted- Dr. Farooq, will follow rec - Pt presenting with non-specific symptoms of generalized weakness and confusion, with mild URI symptoms, cough with fever , found to be COVID positive  - was reportedly hypoxic <89% on RA, on 2litre NC   - CXR;  negative - No pneumothorax, No opacities, No free air.  - Given Tylenol and IV fluid 1litre NS in ER   - Continue remdesivir for 5 days  - was started on dexamethasone 6mg PO for 10days however unsure of utility as patient is not significantly hypoxic; f/u ID recs  - Continue with supportive care (Tylenol, Robitussin)  - Continuos remote pulse ox monitoring  - procal .1, D-Dimer 798, , CRP 36, Ferritin 822 levels in AM  - PT/OT/SW consulted for generalized weakness, impaired ambulation and ADLs--> PT recommends CARLYN.  - ID consulted- Dr. Farooq, will follow rec - Pt presenting with non-specific symptoms of generalized weakness and confusion, with mild URI symptoms, cough with fever , found to be COVID positive  - was reportedly hypoxic <89% on RA, on 2litre NC but no clear documentation and is currently 94% on RA on exertion after working with physical therapy -- discussed with ID and will d/c decadron   - CXR;  negative - No pneumothorax, No opacities, No free air.  - Given Tylenol and IV fluid 1litre NS in ER   - Continue remdesivir for 3-5 days  - Continue with supportive care (Tylenol, Robitussin)  - Continuos remote pulse ox monitoring  - procal .1, D-Dimer 798, , CRP 36, Ferritin 822 levels in AM  - PT/OT/SW consulted for generalized weakness, impaired ambulation and ADLs--> PT recommends CARLYN.  - ID consulted- Dr. Rubio, recs appreciated

## 2022-10-15 NOTE — PROGRESS NOTE ADULT - PROBLEM SELECTOR PLAN 5
- On admission, BUN/Cr: 26/1.2  baseline 0.7-0.8   - likely prerenal azotemia from decreased PO intake   - s/p 1 litre NS in ED  - NS at rate of 50 cc/hr x 12 hours   - Avoid nephrotoxic agents  - F/u BMP in AM - On admission, BUN/Cr: 26/1.2  baseline 0.7-0.8; now resolved  - likely prerenal azotemia from decreased PO intake.   - s/p 1 litre NS in ED  - NS at rate of 50 cc/hr x 12 hours   - Avoid nephrotoxic agents  - F/u routine bmp - On admission, BUN/Cr: 26/1.2  baseline 0.7-0.8; now resolved  - likely prerenal azotemia from decreased PO intake.   - s/p 1 litre NS in ED  - NS at rate of 50 cc/hr x 12 hours   - Avoid nephrotoxic agents  - F/u BMP

## 2022-10-15 NOTE — DISCHARGE NOTE PROVIDER - CARE PROVIDER_API CALL
FREDDIE MEDRANO  Internal Medicine  [address] [facility], [address]  [place] [address]  Phone: ()-  Fax: ()-  Established Patient  Follow Up Time: 1 week

## 2022-10-15 NOTE — PROGRESS NOTE ADULT - PROBLEM SELECTOR PLAN 3
Recent admission for PE with right heart strain  - in retrospect may have been provoked by COVID-19 given presence of infiltrates  - continue therapeutic AC with apixaban - Recent admission for PE with right heart strain  - continue therapeutic AC with apixaban  - monitor closely for any significant hemoptysis

## 2022-10-15 NOTE — PHYSICAL THERAPY INITIAL EVALUATION ADULT - PERTINENT HX OF CURRENT PROBLEM, REHAB EVAL
88 yo M with PMHx of HLD, BPH and dementia presents to the ED with hemoptysis and weakness. Obtained collateral history from wife Lima ([phone]). Patient is AAOx2,reports he is feeling fine, mild cough with fever. Per wife, he has this intermittent cough for past 1 week and Patient was admitted on 10/7/22 for b/l PE, started on heparin gtt for b/l submassive PE. LE Dopplers negative for DVT b/l

## 2022-10-15 NOTE — PATIENT PROFILE ADULT - FALL HARM RISK - HARM RISK INTERVENTIONS

## 2022-10-15 NOTE — PROGRESS NOTE ADULT - PROBLEM SELECTOR PLAN 6
. Patient with new onset afib with RVR in ICU, last admission  - Started on metoprolol 25mg BID for rate control. Continue   - AC Eliquis 5mg BID   - EKG; NSR with sinus arrhthymias QT/QTc 412/478 ( today) - Patient with new onset afib with RVR in ICU, last admission  - Started on metoprolol 25mg BID for rate control. Continue   - AC Eliquis 5mg BID   - EKG; NSR with sinus arrhthymias QT/QTc 412/478

## 2022-10-15 NOTE — PATIENT PROFILE ADULT - TRANSPORTATION
Detail Level: Simple Additional Notes: Allergy testing if symptoms persist and topicals fail. Render Risk Assessment In Note?: no no

## 2022-10-15 NOTE — PHYSICAL THERAPY INITIAL EVALUATION ADULT - GAIT TRAINING, PT EVAL
Patient will ambulate x 100 feet with appropriate device independently in order to safely navigate home

## 2022-10-15 NOTE — PROGRESS NOTE ADULT - SUBJECTIVE AND OBJECTIVE BOX
Patient is a 87y old  Male who presents with a chief complaint of weakness, COVID positive (14 Oct 2022 23:53)      INTERVAL HPI/OVERNIGHT EVENTS: Patient seen and examined at the bedside. There were no acute events overnight. Patient has no acute complaints at this time and states he feels relatively well. Denies any significant shortness of breath. Denies significant recent hemoptysis. Denies fever, chills, cp, abdominal pain, nausea, vomiting, diarrhea, constipation. States he has not had a bowel movement in 2 weeks however is not entirely sure he is remembering correctly.    MEDICATIONS  (STANDING):  apixaban 5 milliGRAM(s) Oral every 12 hours  finasteride 5 milliGRAM(s) Oral daily  influenza  Vaccine (HIGH DOSE) 0.7 milliLiter(s) IntraMuscular once  metoprolol tartrate 25 milliGRAM(s) Oral two times a day  polyethylene glycol 3350 17 Gram(s) Oral daily  remdesivir  IVPB   IV Intermittent   senna 2 Tablet(s) Oral at bedtime  sodium chloride 0.9%. 1000 milliLiter(s) (50 mL/Hr) IV Continuous <Continuous>    MEDICATIONS  (PRN):  acetaminophen     Tablet .. 650 milliGRAM(s) Oral every 6 hours PRN Temp greater or equal to 38C (100.4F), Mild Pain (1 - 3)  guaiFENesin Oral Liquid (Sugar-Free) 200 milliGRAM(s) Oral every 6 hours PRN Cough  melatonin 3 milliGRAM(s) Oral at bedtime PRN Insomnia      Allergies    No Known Allergies    Intolerances        REVIEW OF SYSTEMS:  CONSTITUTIONAL: No fever or chills  HEENT:  No headache, no sore throat  RESPIRATORY: No cough, wheezing, or shortness of breath  CARDIOVASCULAR: No chest pain, palpitations  GASTROINTESTINAL: No abd pain, nausea, vomiting, or diarrhea  GENITOURINARY: No dysuria, frequency, or hematuria  NEUROLOGICAL: no focal weakness or dizziness  MUSCULOSKELETAL: no myalgias     Vital Signs Last 24 Hrs  T(C): 37.4 (15 Oct 2022 10:56), Max: 38.6 (14 Oct 2022 20:24)  T(F): 99.4 (15 Oct 2022 10:56), Max: 101.5 (14 Oct 2022 20:24)  HR: 81 (15 Oct 2022 10:56) (67 - 88)  BP: 147/73 (15 Oct 2022 10:56) (120/65 - 147/73)  RR: 14 (15 Oct 2022 10:56) (14 - 18)  SpO2: 94% (15 Oct 2022 10:56) (92% - 98%)    Parameters below as of 15 Oct 2022 10:56  Patient On (Oxygen Delivery Method): room air        PHYSICAL EXAM:  GENERAL: NAD, laying comfortably in bed  HEENT:  anicteric, moist mucous membranes  CHEST/LUNG:  coughing intermittently with deep breaths, otherwise CTA b/l, no rales, wheezes, or rhonchi  HEART:  RRR, S1, S2  ABDOMEN:  BS+, soft, nontender, nondistended  EXTREMITIES: no edema, cyanosis, or calf tenderness  NERVOUS SYSTEM: answers questions and follows commands appropriately    LABS:                        9.8    5.31  )-----------( 217      ( 15 Oct 2022 04:39 )             30.4     CBC Full  -  ( 15 Oct 2022 04:39 )  WBC Count : 5.31 K/uL  Hemoglobin : 9.8 g/dL  Hematocrit : 30.4 %  Platelet Count - Automated : 217 K/uL  Mean Cell Volume : 92.1 fl  Mean Cell Hemoglobin : 29.7 pg  Mean Cell Hemoglobin Concentration : 32.2 gm/dL  Auto Neutrophil # : 2.94 K/uL  Auto Lymphocyte # : 1.48 K/uL  Auto Monocyte # : 0.83 K/uL  Auto Eosinophil # : 0.00 K/uL  Auto Basophil # : 0.01 K/uL  Auto Neutrophil % : 55.4 %  Auto Lymphocyte % : 27.9 %  Auto Monocyte % : 15.6 %  Auto Eosinophil % : 0.0 %  Auto Basophil % : 0.2 %    15 Oct 2022 04:39    143    |  112    |  21     ----------------------------<  97     4.1     |  24     |  0.84     Ca    7.7        15 Oct 2022 04:39    TPro  5.9    /  Alb  2.3    /  TBili  0.5    /  DBili  x      /  AST  68     /  ALT  122    /  AlkPhos  61     15 Oct 2022 04:39    PT/INR - ( 14 Oct 2022 17:50 )   PT: 27.6 sec;   INR: 2.34 ratio         PTT - ( 14 Oct 2022 17:50 )  PTT:45.3 sec  Urinalysis Basic - ( 14 Oct 2022 22:15 )    Color: Yellow / Appearance: Clear / S.025 / pH: x  Gluc: x / Ketone: Trace  / Bili: Negative / Urobili: 1   Blood: x / Protein: 15 / Nitrite: Negative   Leuk Esterase: Negative / RBC: 0-2 /HPF / WBC 0-2   Sq Epi: x / Non Sq Epi: Occasional / Bacteria: Occasional      CAPILLARY BLOOD GLUCOSE              RADIOLOGY & ADDITIONAL TESTS:    Personally reviewed.     Consultant(s) Notes Reviewed:  [x] YES  [ ] NO     Patient is a 87y old  Male who presents with a chief complaint of weakness, hemoptysis.      INTERVAL HPI/OVERNIGHT EVENTS: Patient seen and examined at the bedside. There were no acute events overnight. Patient has no acute complaints at this time and states he feels relatively well. Denies any significant shortness of breath. Denies significant recent hemoptysis. Denies fever, chills, CP, abdominal pain, nausea, vomiting, diarrhea, constipation. States he has not had a bowel movement in 2 weeks, however, is not entirely sure he is remembering correctly.    MEDICATIONS  (STANDING):  apixaban 5 milliGRAM(s) Oral every 12 hours  finasteride 5 milliGRAM(s) Oral daily  influenza  Vaccine (HIGH DOSE) 0.7 milliLiter(s) IntraMuscular once  metoprolol tartrate 25 milliGRAM(s) Oral two times a day  polyethylene glycol 3350 17 Gram(s) Oral daily  remdesivir  IVPB   IV Intermittent   senna 2 Tablet(s) Oral at bedtime  sodium chloride 0.9%. 1000 milliLiter(s) (50 mL/Hr) IV Continuous <Continuous>    MEDICATIONS  (PRN):  acetaminophen     Tablet .. 650 milliGRAM(s) Oral every 6 hours PRN Temp greater or equal to 38C (100.4F), Mild Pain (1 - 3)  guaiFENesin Oral Liquid (Sugar-Free) 200 milliGRAM(s) Oral every 6 hours PRN Cough  melatonin 3 milliGRAM(s) Oral at bedtime PRN Insomnia      Allergies    No Known Allergies    Intolerances        REVIEW OF SYSTEMS:  CONSTITUTIONAL: No fever or chills  HEENT:  No headache, no sore throat  RESPIRATORY: cough (improved), no wheezing, or shortness of breath  CARDIOVASCULAR: No chest pain, palpitations  GASTROINTESTINAL: +constipation; No abd pain, nausea, vomiting, or diarrhea  GENITOURINARY: No dysuria, frequency, or hematuria  NEUROLOGICAL: no focal weakness or dizziness  MUSCULOSKELETAL: no myalgias     Vital Signs Last 24 Hrs  T(C): 37.4 (15 Oct 2022 10:56), Max: 38.6 (14 Oct 2022 20:24)  T(F): 99.4 (15 Oct 2022 10:56), Max: 101.5 (14 Oct 2022 20:24)  HR: 81 (15 Oct 2022 10:56) (67 - 88)  BP: 147/73 (15 Oct 2022 10:56) (120/65 - 147/73)  RR: 14 (15 Oct 2022 10:56) (14 - 18)  SpO2: 94% (15 Oct 2022 10:56) (92% - 98%)    Parameters below as of 15 Oct 2022 10:56  Patient On (Oxygen Delivery Method): room air        PHYSICAL EXAM:  GENERAL: NAD, lying comfortably in bed  HEENT:  anicteric, moist mucous membranes  CHEST/LUNG:  coughing intermittently with deep breaths, otherwise CTA b/l, no rales, wheezes, or rhonchi  HEART:  RRR, S1, S2  ABDOMEN:  BS+, soft, nontender, nondistended  EXTREMITIES: no edema, cyanosis, or calf tenderness  NERVOUS SYSTEM: answers questions and follows commands appropriately    LABS:                        9.8    5.31  )-----------( 217      ( 15 Oct 2022 04:39 )             30.4     CBC Full  -  ( 15 Oct 2022 04:39 )  WBC Count : 5.31 K/uL  Hemoglobin : 9.8 g/dL  Hematocrit : 30.4 %  Platelet Count - Automated : 217 K/uL  Mean Cell Volume : 92.1 fl  Mean Cell Hemoglobin : 29.7 pg  Mean Cell Hemoglobin Concentration : 32.2 gm/dL  Auto Neutrophil # : 2.94 K/uL  Auto Lymphocyte # : 1.48 K/uL  Auto Monocyte # : 0.83 K/uL  Auto Eosinophil # : 0.00 K/uL  Auto Basophil # : 0.01 K/uL  Auto Neutrophil % : 55.4 %  Auto Lymphocyte % : 27.9 %  Auto Monocyte % : 15.6 %  Auto Eosinophil % : 0.0 %  Auto Basophil % : 0.2 %    15 Oct 2022 04:39    143    |  112    |  21     ----------------------------<  97     4.1     |  24     |  0.84     Ca    7.7        15 Oct 2022 04:39    TPro  5.9    /  Alb  2.3    /  TBili  0.5    /  DBili  x      /  AST  68     /  ALT  122    /  AlkPhos  61     15 Oct 2022 04:39    PT/INR - ( 14 Oct 2022 17:50 )   PT: 27.6 sec;   INR: 2.34 ratio         PTT - ( 14 Oct 2022 17:50 )  PTT:45.3 sec  Urinalysis Basic - ( 14 Oct 2022 22:15 )    Color: Yellow / Appearance: Clear / S.025 / pH: x  Gluc: x / Ketone: Trace  / Bili: Negative / Urobili: 1   Blood: x / Protein: 15 / Nitrite: Negative   Leuk Esterase: Negative / RBC: 0-2 /HPF / WBC 0-2   Sq Epi: x / Non Sq Epi: Occasional / Bacteria: Occasional      CAPILLARY BLOOD GLUCOSE              RADIOLOGY & ADDITIONAL TESTS:    Personally reviewed.     Consultant(s) Notes Reviewed:  [x] YES  [ ] NO

## 2022-10-15 NOTE — DISCHARGE NOTE PROVIDER - NSDCMRMEDTOKEN_GEN_ALL_CORE_FT
Eliquis 5 mg oral tablet: 1 tab(s) orally 2 times a day  finasteride 5 mg oral tablet: 1 tab(s) orally once a day  metoprolol tartrate 25 mg oral tablet: 1 tab(s) orally 2 times a day  rivastigmine 9.5 mg/24 hr transdermal film, extended release: 1 patch transdermal once a day  simvastatin 20 mg oral tablet: 1 tab(s) orally once a day (at bedtime)   Eliquis 5 mg oral tablet: 1 tab(s) orally 2 times a day  finasteride 5 mg oral tablet: 1 tab(s) orally once a day  metoprolol tartrate 25 mg oral tablet: 1 tab(s) orally 2 times a day  Multiple Vitamins with Minerals oral tablet: 1 tab(s) orally once a day  rivastigmine 9.5 mg/24 hr transdermal film, extended release: 1 patch transdermal once a day  simvastatin 20 mg oral tablet: 1 tab(s) orally once a day (at bedtime)   Eliquis 5 mg oral tablet: 1 tab(s) orally 2 times a day  finasteride 5 mg oral tablet: 1 tab(s) orally once a day  metoprolol tartrate 25 mg oral tablet: 1 tab(s) orally 2 times a day  Multiple Vitamins with Minerals oral tablet: 1 tab(s) orally once a day  rivastigmine 9.5 mg/24 hr transdermal film, extended release: 1 patch transdermal once a day  senna leaf extract oral tablet: 2 tab(s) orally once a day (at bedtime)

## 2022-10-16 DIAGNOSIS — R74.01 ELEVATION OF LEVELS OF LIVER TRANSAMINASE LEVELS: ICD-10-CM

## 2022-10-16 LAB
ALBUMIN SERPL ELPH-MCNC: 2.2 G/DL — LOW (ref 3.3–5)
ALBUMIN SERPL ELPH-MCNC: 2.3 G/DL — LOW (ref 3.3–5)
ALP SERPL-CCNC: 56 U/L — SIGNIFICANT CHANGE UP (ref 40–120)
ALP SERPL-CCNC: 57 U/L — SIGNIFICANT CHANGE UP (ref 40–120)
ALT FLD-CCNC: 112 U/L — HIGH (ref 12–78)
ANION GAP SERPL CALC-SCNC: 6 MMOL/L — SIGNIFICANT CHANGE UP (ref 5–17)
AST SERPL-CCNC: 64 U/L — HIGH (ref 15–37)
AST SERPL-CCNC: 66 U/L — HIGH (ref 15–37)
BASOPHILS # BLD AUTO: 0.01 K/UL — SIGNIFICANT CHANGE UP (ref 0–0.2)
BASOPHILS NFR BLD AUTO: 0.2 % — SIGNIFICANT CHANGE UP (ref 0–2)
BILIRUB DIRECT SERPL-MCNC: 0.2 MG/DL — SIGNIFICANT CHANGE UP (ref 0–0.3)
BILIRUB INDIRECT FLD-MCNC: 0.2 MG/DL — SIGNIFICANT CHANGE UP (ref 0.2–1)
BILIRUB SERPL-MCNC: 0.4 MG/DL — SIGNIFICANT CHANGE UP (ref 0.2–1.2)
BUN SERPL-MCNC: 17 MG/DL — SIGNIFICANT CHANGE UP (ref 7–23)
CALCIUM SERPL-MCNC: 8.2 MG/DL — LOW (ref 8.5–10.1)
CHLORIDE SERPL-SCNC: 107 MMOL/L — SIGNIFICANT CHANGE UP (ref 96–108)
CHOLEST SERPL-MCNC: 107 MG/DL — SIGNIFICANT CHANGE UP
CO2 SERPL-SCNC: 27 MMOL/L — SIGNIFICANT CHANGE UP (ref 22–31)
CREAT SERPL-MCNC: 0.75 MG/DL — SIGNIFICANT CHANGE UP (ref 0.5–1.3)
CULTURE RESULTS: SIGNIFICANT CHANGE UP
EGFR: 87 ML/MIN/1.73M2 — SIGNIFICANT CHANGE UP
EOSINOPHIL # BLD AUTO: 0 K/UL — SIGNIFICANT CHANGE UP (ref 0–0.5)
EOSINOPHIL NFR BLD AUTO: 0 % — SIGNIFICANT CHANGE UP (ref 0–6)
GLUCOSE SERPL-MCNC: 95 MG/DL — SIGNIFICANT CHANGE UP (ref 70–99)
HCT VFR BLD CALC: 28.5 % — LOW (ref 39–50)
HDLC SERPL-MCNC: 22 MG/DL — LOW
HGB BLD-MCNC: 9.4 G/DL — LOW (ref 13–17)
IMM GRANULOCYTES NFR BLD AUTO: 0.6 % — SIGNIFICANT CHANGE UP (ref 0–0.9)
LIPID PNL WITH DIRECT LDL SERPL: 66 MG/DL — SIGNIFICANT CHANGE UP
LYMPHOCYTES # BLD AUTO: 1.7 K/UL — SIGNIFICANT CHANGE UP (ref 1–3.3)
LYMPHOCYTES # BLD AUTO: 36.4 % — SIGNIFICANT CHANGE UP (ref 13–44)
MAGNESIUM SERPL-MCNC: 2.2 MG/DL — SIGNIFICANT CHANGE UP (ref 1.6–2.6)
MCHC RBC-ENTMCNC: 29.9 PG — SIGNIFICANT CHANGE UP (ref 27–34)
MCHC RBC-ENTMCNC: 33 GM/DL — SIGNIFICANT CHANGE UP (ref 32–36)
MCV RBC AUTO: 90.8 FL — SIGNIFICANT CHANGE UP (ref 80–100)
MONOCYTES # BLD AUTO: 0.7 K/UL — SIGNIFICANT CHANGE UP (ref 0–0.9)
MONOCYTES NFR BLD AUTO: 15 % — HIGH (ref 2–14)
NEUTROPHILS # BLD AUTO: 2.23 K/UL — SIGNIFICANT CHANGE UP (ref 1.8–7.4)
NEUTROPHILS NFR BLD AUTO: 47.8 % — SIGNIFICANT CHANGE UP (ref 43–77)
NON HDL CHOLESTEROL: 85 MG/DL — SIGNIFICANT CHANGE UP
NRBC # BLD: 0 /100 WBCS — SIGNIFICANT CHANGE UP (ref 0–0)
PHOSPHATE SERPL-MCNC: 2.3 MG/DL — LOW (ref 2.5–4.5)
PLATELET # BLD AUTO: 219 K/UL — SIGNIFICANT CHANGE UP (ref 150–400)
POTASSIUM SERPL-MCNC: 3.4 MMOL/L — LOW (ref 3.5–5.3)
POTASSIUM SERPL-SCNC: 3.4 MMOL/L — LOW (ref 3.5–5.3)
PROT SERPL-MCNC: 5.8 G/DL — LOW (ref 6–8.3)
RBC # BLD: 3.14 M/UL — LOW (ref 4.2–5.8)
RBC # FLD: 12.8 % — SIGNIFICANT CHANGE UP (ref 10.3–14.5)
SODIUM SERPL-SCNC: 140 MMOL/L — SIGNIFICANT CHANGE UP (ref 135–145)
SPECIMEN SOURCE: SIGNIFICANT CHANGE UP
TRIGL SERPL-MCNC: 92 MG/DL — SIGNIFICANT CHANGE UP
WBC # BLD: 4.67 K/UL — SIGNIFICANT CHANGE UP (ref 3.8–10.5)
WBC # FLD AUTO: 4.67 K/UL — SIGNIFICANT CHANGE UP (ref 3.8–10.5)

## 2022-10-16 PROCEDURE — 99233 SBSQ HOSP IP/OBS HIGH 50: CPT

## 2022-10-16 RX ORDER — SODIUM,POTASSIUM PHOSPHATES 278-250MG
1 POWDER IN PACKET (EA) ORAL ONCE
Refills: 0 | Status: COMPLETED | OUTPATIENT
Start: 2022-10-16 | End: 2022-10-16

## 2022-10-16 RX ORDER — MULTIVIT-MIN/FERROUS GLUCONATE 9 MG/15 ML
1 LIQUID (ML) ORAL DAILY
Refills: 0 | Status: CANCELLED | OUTPATIENT
Start: 2022-10-16 | End: 2022-10-18

## 2022-10-16 RX ORDER — POTASSIUM CHLORIDE 20 MEQ
40 PACKET (EA) ORAL ONCE
Refills: 0 | Status: COMPLETED | OUTPATIENT
Start: 2022-10-16 | End: 2022-10-16

## 2022-10-16 RX ADMIN — APIXABAN 5 MILLIGRAM(S): 2.5 TABLET, FILM COATED ORAL at 17:02

## 2022-10-16 RX ADMIN — FINASTERIDE 5 MILLIGRAM(S): 5 TABLET, FILM COATED ORAL at 21:24

## 2022-10-16 RX ADMIN — REMDESIVIR 500 MILLIGRAM(S): 5 INJECTION INTRAVENOUS at 03:01

## 2022-10-16 RX ADMIN — Medication 40 MILLIEQUIVALENT(S): at 11:03

## 2022-10-16 RX ADMIN — APIXABAN 5 MILLIGRAM(S): 2.5 TABLET, FILM COATED ORAL at 05:29

## 2022-10-16 RX ADMIN — SENNA PLUS 2 TABLET(S): 8.6 TABLET ORAL at 21:24

## 2022-10-16 RX ADMIN — Medication 1 PACKET(S): at 17:02

## 2022-10-16 RX ADMIN — POLYETHYLENE GLYCOL 3350 17 GRAM(S): 17 POWDER, FOR SOLUTION ORAL at 11:04

## 2022-10-16 NOTE — DIETITIAN INITIAL EVALUATION ADULT - PERTINENT MEDS FT
MEDICATIONS  (STANDING):  apixaban 5 milliGRAM(s) Oral every 12 hours  finasteride 5 milliGRAM(s) Oral daily  influenza  Vaccine (HIGH DOSE) 0.7 milliLiter(s) IntraMuscular once  metoprolol tartrate 25 milliGRAM(s) Oral two times a day  polyethylene glycol 3350 17 Gram(s) Oral daily  remdesivir  IVPB 100 milliGRAM(s) IV Intermittent every 24 hours  remdesivir  IVPB   IV Intermittent   senna 2 Tablet(s) Oral at bedtime  sodium chloride 0.9%. 1000 milliLiter(s) (50 mL/Hr) IV Continuous <Continuous>    MEDICATIONS  (PRN):  acetaminophen     Tablet .. 650 milliGRAM(s) Oral every 6 hours PRN Temp greater or equal to 38C (100.4F), Mild Pain (1 - 3)  guaiFENesin Oral Liquid (Sugar-Free) 200 milliGRAM(s) Oral every 6 hours PRN Cough  melatonin 3 milliGRAM(s) Oral at bedtime PRN Insomnia

## 2022-10-16 NOTE — PROGRESS NOTE ADULT - ASSESSMENT
86yo M with PMHx of HLD, BPH, dementia, recent admission for submassive PE c/b new onset afib with RVR presents to the ED with hemoptysis and weakness a/w COVID infection and hemoptysis on Eliquis.

## 2022-10-16 NOTE — PROGRESS NOTE ADULT - PROBLEM SELECTOR PLAN 3
- Recent admission for PE with right heart strain  - continue therapeutic AC with apixaban  - monitor closely for any significant hemoptysis

## 2022-10-16 NOTE — DIETITIAN INITIAL EVALUATION ADULT - OTHER INFO
86yo M with PMHx of HLD, BPH, dementia, recent admission for submassive PE c/b new onset afib with RVR presents to the ED with hemoptysis and weakness a/w COVID infection and hemoptysis on Eliquis.     Pt awake/alert, confused at times with hx dementia. Dash/TLC diet rx. Lack of appetite with decreased po intake for past 1-2 months pta. Seen by RD on 10/7 during previous hospital admission. Noted to have decreased po intake x 1 month. , current weight 190.4lbs(10/15). At present pt reports fair appetite/po intake. No report difficulty chewing/swallowing however states he did have an episode of choking on potassium pill this morning; now with persistent cough for past hr. RN aware. No report N/V. Hx constipation. Bowel regimen rx. Encourage po fluids/dietary fiber as tolerated. Low K 3.2, phos 2.3 (10/16)- supplemented with KCl. Recommend additional phos supplementation.  Food preferences/meal alternatives obtained to maximize po intake. Agreeable to Ensure enlive 8oz po BID to maximize po intake/prevent further wt loss.

## 2022-10-16 NOTE — PROGRESS NOTE ADULT - SUBJECTIVE AND OBJECTIVE BOX
Patient is a 87y old  Male who presents with a chief complaint of weakness, COVID positive (15 Oct 2022 17:10)      INTERVAL HPI/OVERNIGHT EVENTS: Patient seen and examined at the bedside. There were no acute events overnight. Today patient states he feels much better than previously and asks when he can be sent home. Still complains of intermittent cough, but denies any further episodes of hemoptysis. Denies fever, chills, cp, sob, abdominal pain, n/v.     MEDICATIONS  (STANDING):  apixaban 5 milliGRAM(s) Oral every 12 hours  finasteride 5 milliGRAM(s) Oral daily  influenza  Vaccine (HIGH DOSE) 0.7 milliLiter(s) IntraMuscular once  metoprolol tartrate 25 milliGRAM(s) Oral two times a day  polyethylene glycol 3350 17 Gram(s) Oral daily  remdesivir  IVPB 100 milliGRAM(s) IV Intermittent every 24 hours  remdesivir  IVPB   IV Intermittent   senna 2 Tablet(s) Oral at bedtime  sodium chloride 0.9%. 1000 milliLiter(s) (50 mL/Hr) IV Continuous <Continuous>    MEDICATIONS  (PRN):  acetaminophen     Tablet .. 650 milliGRAM(s) Oral every 6 hours PRN Temp greater or equal to 38C (100.4F), Mild Pain (1 - 3)  guaiFENesin Oral Liquid (Sugar-Free) 200 milliGRAM(s) Oral every 6 hours PRN Cough  melatonin 3 milliGRAM(s) Oral at bedtime PRN Insomnia      Allergies    No Known Allergies    Intolerances        REVIEW OF SYSTEMS:  CONSTITUTIONAL: No fever or chills  HEENT:  No headache, no sore throat  RESPIRATORY: +cough, no wheezing, or shortness of breath  CARDIOVASCULAR: No chest pain, palpitations  GASTROINTESTINAL: No abd pain, nausea, vomiting, or diarrhea  GENITOURINARY: No dysuria, frequency, or hematuria  NEUROLOGICAL: no focal weakness or dizziness  MUSCULOSKELETAL: no myalgias     Vital Signs Last 24 Hrs  T(C): 36.9 (16 Oct 2022 05:35), Max: 37.4 (15 Oct 2022 20:31)  T(F): 98.5 (16 Oct 2022 05:35), Max: 99.4 (15 Oct 2022 20:31)  HR: 60 (16 Oct 2022 05:35) (60 - 60)  BP: 134/76 (16 Oct 2022 05:35) (123/68 - 134/76)  RR: 17 (16 Oct 2022 05:35) (16 - 17)  SpO2: 95% (16 Oct 2022 05:35) (95% - 95%)    Parameters below as of 16 Oct 2022 05:35  Patient On (Oxygen Delivery Method): room air        PHYSICAL EXAM:  GENERAL: NAD, lying comfortably in bed  HEENT:  anicteric, moist mucous membranes  CHEST/LUNG:  coughing intermittently with deep breaths, otherwise CTA b/l, no rales, wheezes, or rhonchi  HEART:  RRR, S1, S2  ABDOMEN:  BS+, soft, nontender, nondistended  EXTREMITIES: no edema, cyanosis, or calf tenderness  NERVOUS SYSTEM: answers questions and follows commands appropriately    LABS:                        9.4    4.67  )-----------( 219      ( 16 Oct 2022 07:32 )             28.5     CBC Full  -  ( 16 Oct 2022 07:32 )  WBC Count : 4.67 K/uL  Hemoglobin : 9.4 g/dL  Hematocrit : 28.5 %  Platelet Count - Automated : 219 K/uL  Mean Cell Volume : 90.8 fl  Mean Cell Hemoglobin : 29.9 pg  Mean Cell Hemoglobin Concentration : 33.0 gm/dL  Auto Neutrophil # : 2.23 K/uL  Auto Lymphocyte # : 1.70 K/uL  Auto Monocyte # : 0.70 K/uL  Auto Eosinophil # : 0.00 K/uL  Auto Basophil # : 0.01 K/uL  Auto Neutrophil % : 47.8 %  Auto Lymphocyte % : 36.4 %  Auto Monocyte % : 15.0 %  Auto Eosinophil % : 0.0 %  Auto Basophil % : 0.2 %    16 Oct 2022 07:32    140    |  107    |  17     ----------------------------<  95     3.4     |  27     |  0.75     Ca    8.2        16 Oct 2022 07:32  Phos  2.3       16 Oct 2022 07:32  Mg     2.2       16 Oct 2022 07:32    TPro  5.8    /  Alb  2.3    /  TBili  0.4    /  DBili  0.2    /  AST  64     /  ALT  112    /  AlkPhos  56     16 Oct 2022 07:32    PT/INR - ( 14 Oct 2022 17:50 )   PT: 27.6 sec;   INR: 2.34 ratio         PTT - ( 14 Oct 2022 17:50 )  PTT:45.3 sec  Urinalysis Basic - ( 14 Oct 2022 22:15 )    Color: Yellow / Appearance: Clear / S.025 / pH: x  Gluc: x / Ketone: Trace  / Bili: Negative / Urobili: 1   Blood: x / Protein: 15 / Nitrite: Negative   Leuk Esterase: Negative / RBC: 0-2 /HPF / WBC 0-2   Sq Epi: x / Non Sq Epi: Occasional / Bacteria: Occasional      CAPILLARY BLOOD GLUCOSE            Culture - Urine (collected 10-14-22 @ 22:15)  Source: Clean Catch Clean Catch (Midstream)  Final Report (10-16-22 @ 07:34):    <10,000 CFU/mL Normal Urogenital [place]    Culture - Blood (collected 10-14-22 @ 21:50)  Source: .Blood Blood-Peripheral  Preliminary Report (10-16-22 @ 01:02):    No growth to date.    Culture - Blood (collected 10-14-22 @ 21:45)  Source: .Blood Blood-Peripheral  Preliminary Report (10-16-22 @ 01:02):    No growth to date.        RADIOLOGY & ADDITIONAL TESTS:    Personally reviewed.     Consultant(s) Notes Reviewed:  [x] YES  [ ] NO

## 2022-10-16 NOTE — PROGRESS NOTE ADULT - PROBLEM SELECTOR PLAN 2
- pt a/w hemoptysis In the setting of known PE on AC  - no significant hemoptysis during this hospitalization, however, will continue to monitor closely  - f/up pulm, Dr. Orozco consult

## 2022-10-16 NOTE — PROGRESS NOTE ADULT - PROBLEM SELECTOR PLAN 6
- Patient with new onset afib with RVR in ICU, last admission  - Started on metoprolol 25mg BID for rate control. Continue   - AC Eliquis 5mg BID   - EKG; NSR with sinus arrhthymias QT/QTc 412/478

## 2022-10-16 NOTE — PROGRESS NOTE ADULT - PROBLEM SELECTOR PLAN 4
- patient presented with weakness 2/2 decrease PO intake and COVID   - ALBUMIN 2.9  - Encourage PO intake   - s/p 1 litre NS  - maintenance  fluid 50cc/hr for 12 hrs also in the setting of increase cr   - TTE performed last admission, showed EF of 65%.   - aspiration  precautions  - fall risk protocol  - PT/OT consult  - Nutrition consult

## 2022-10-16 NOTE — DIETITIAN INITIAL EVALUATION ADULT - NSICDXPASTMEDICALHX_GEN_ALL_CORE_FT
PAST MEDICAL HISTORY:  Atrial fibrillation with RVR     Dementia     Enlarged prostate     HLD (hyperlipidemia)     Pulmonary embolism

## 2022-10-16 NOTE — DIETITIAN INITIAL EVALUATION ADULT - PERTINENT LABORATORY DATA
10-16    140  |  107  |  17  ----------------------------<  95  3.4<L>   |  27  |  0.75    Ca    8.2<L>      16 Oct 2022 07:32  Phos  2.3     10-16  Mg     2.2     10-16    TPro  5.8<L>  /  Alb  2.3<L>  /  TBili  0.4  /  DBili  0.2  /  AST  64<H>  /  ALT  112<H>  /  AlkPhos  56  10-16  A1C with Estimated Average Glucose Result: 5.7 % (10-15-22 @ 04:39)

## 2022-10-16 NOTE — PROGRESS NOTE ADULT - PROBLEM SELECTOR PLAN 1
- Pt presenting with non-specific symptoms of generalized weakness and confusion, with mild URI symptoms, cough with fever , found to be COVID positive  - was reportedly hypoxic <89% on RA, on 2litre NC but no clear documentation and is currently 94% on RA on exertion after working with physical therapy -- discussed with ID and will d/c decadron   - CXR;  negative - No pneumothorax, No opacities, No free air.  - Given Tylenol and IV fluid 1litre NS in ER   - Will continue remdesivir for today and tomorrow, likely discontinue after tomorrow's dose.   - Continue with supportive care (Tylenol, Robitussin)  - Continuos remote pulse ox monitoring  - procal .1, D-Dimer 798, , CRP 36, Ferritin 822 levels in AM  - PT/OT/SW consulted for generalized weakness, impaired ambulation and ADLs--> PT recommends CARLYN.  - ID consulted- Dr. Rubio, recs appreciated

## 2022-10-16 NOTE — PROGRESS NOTE ADULT - PROBLEM SELECTOR PLAN 11
Patient states he has not had a bowel movement in 2 weeks however states this is normal for him. On further questioning he states he may be misremembering. Denies constipation. On exam, abdomen is soft/nontender/nondistended.  - will start bowel regimen with senna/miralax.    #Dementia:  - Chronic   - Pt is on home Rivastigmine patch QD.

## 2022-10-16 NOTE — CHART NOTE - NSCHARTNOTEFT_GEN_A_CORE
Patient remains afebrile and on room air. He has no leukocytosis and blood/urine cultures no growth currently. If remains on room air can complete 3 days of remdesivir to complete 3 days.    Will sign off, please call ID if any further questions. Thank you.      Tosha Rubio MD  Division of Infectious Diseases   Cell [phone] between 8am and 6pm   After 6pm and weekends please call ID service at [phone].

## 2022-10-16 NOTE — PROGRESS NOTE ADULT - PROBLEM SELECTOR PLAN 8
- H/H 11.7/35.7 on admission ; baseline 11-12, chronic anemia   - Monitor for signs/symptoms of bleeding  - Monitor daily CBC.

## 2022-10-16 NOTE — PROGRESS NOTE ADULT - PROBLEM SELECTOR PLAN 5
- On admission, BUN/Cr: 26/1.2  baseline 0.7-0.8; now resolved  - likely prerenal azotemia from decreased PO intake.   - s/p 1 litre NS in ED  - NS at rate of 50 cc/hr x 12 hours   - Avoid nephrotoxic agents  - F/u BMP

## 2022-10-16 NOTE — DIETITIAN INITIAL EVALUATION ADULT - ADD RECOMMEND
Self Ensure enlive 8oz po BID. MVI with minerals daily. Phosphorus supplementation. SLP evaluation per MD discretion with episode choking on pill earlier today.

## 2022-10-16 NOTE — PROGRESS NOTE ADULT - PROBLEM SELECTOR PLAN 7
- AST/ALT elevation 68/122 -- overall improving from last admission  - possibly pt had congestive hepatopathy in setting of acute submassive PE with RV strain and has been improving since being on A/C  - monitor closely  - consider GI consult if worsening  - hold zocor for now

## 2022-10-17 LAB
ALBUMIN SERPL ELPH-MCNC: 2.3 G/DL — LOW (ref 3.3–5)
ALP SERPL-CCNC: 66 U/L — SIGNIFICANT CHANGE UP (ref 40–120)
ALP SERPL-CCNC: 67 U/L — SIGNIFICANT CHANGE UP (ref 40–120)
ALT FLD-CCNC: 123 U/L — HIGH (ref 12–78)
ANION GAP SERPL CALC-SCNC: 9 MMOL/L — SIGNIFICANT CHANGE UP (ref 5–17)
AST SERPL-CCNC: 88 U/L — HIGH (ref 15–37)
AST SERPL-CCNC: 90 U/L — HIGH (ref 15–37)
BASOPHILS # BLD AUTO: 0 K/UL — SIGNIFICANT CHANGE UP (ref 0–0.2)
BASOPHILS NFR BLD AUTO: 0 % — SIGNIFICANT CHANGE UP (ref 0–2)
BILIRUB DIRECT SERPL-MCNC: 0.3 MG/DL — SIGNIFICANT CHANGE UP (ref 0–0.3)
BILIRUB INDIRECT FLD-MCNC: 0.2 MG/DL — SIGNIFICANT CHANGE UP (ref 0.2–1)
BILIRUB SERPL-MCNC: 0.5 MG/DL — SIGNIFICANT CHANGE UP (ref 0.2–1.2)
BILIRUB SERPL-MCNC: 0.6 MG/DL — SIGNIFICANT CHANGE UP (ref 0.2–1.2)
BUN SERPL-MCNC: 14 MG/DL — SIGNIFICANT CHANGE UP (ref 7–23)
CALCIUM SERPL-MCNC: 8.3 MG/DL — LOW (ref 8.5–10.1)
CHLORIDE SERPL-SCNC: 105 MMOL/L — SIGNIFICANT CHANGE UP (ref 96–108)
CO2 SERPL-SCNC: 25 MMOL/L — SIGNIFICANT CHANGE UP (ref 22–31)
CREAT SERPL-MCNC: 0.66 MG/DL — SIGNIFICANT CHANGE UP (ref 0.5–1.3)
EGFR: 91 ML/MIN/1.73M2 — SIGNIFICANT CHANGE UP
EOSINOPHIL # BLD AUTO: 0 K/UL — SIGNIFICANT CHANGE UP (ref 0–0.5)
EOSINOPHIL NFR BLD AUTO: 0 % — SIGNIFICANT CHANGE UP (ref 0–6)
GLUCOSE SERPL-MCNC: 93 MG/DL — SIGNIFICANT CHANGE UP (ref 70–99)
HCT VFR BLD CALC: 30.4 % — LOW (ref 39–50)
HGB BLD-MCNC: 10 G/DL — LOW (ref 13–17)
IMM GRANULOCYTES NFR BLD AUTO: 0.6 % — SIGNIFICANT CHANGE UP (ref 0–0.9)
LYMPHOCYTES # BLD AUTO: 1.53 K/UL — SIGNIFICANT CHANGE UP (ref 1–3.3)
LYMPHOCYTES # BLD AUTO: 30.5 % — SIGNIFICANT CHANGE UP (ref 13–44)
MAGNESIUM SERPL-MCNC: 2.1 MG/DL — SIGNIFICANT CHANGE UP (ref 1.6–2.6)
MCHC RBC-ENTMCNC: 29.6 PG — SIGNIFICANT CHANGE UP (ref 27–34)
MCHC RBC-ENTMCNC: 32.9 GM/DL — SIGNIFICANT CHANGE UP (ref 32–36)
MCV RBC AUTO: 89.9 FL — SIGNIFICANT CHANGE UP (ref 80–100)
MONOCYTES # BLD AUTO: 0.53 K/UL — SIGNIFICANT CHANGE UP (ref 0–0.9)
MONOCYTES NFR BLD AUTO: 10.6 % — SIGNIFICANT CHANGE UP (ref 2–14)
NEUTROPHILS # BLD AUTO: 2.93 K/UL — SIGNIFICANT CHANGE UP (ref 1.8–7.4)
NEUTROPHILS NFR BLD AUTO: 58.3 % — SIGNIFICANT CHANGE UP (ref 43–77)
NRBC # BLD: 0 /100 WBCS — SIGNIFICANT CHANGE UP (ref 0–0)
PHOSPHATE SERPL-MCNC: 3.1 MG/DL — SIGNIFICANT CHANGE UP (ref 2.5–4.5)
PLATELET # BLD AUTO: 236 K/UL — SIGNIFICANT CHANGE UP (ref 150–400)
POTASSIUM SERPL-MCNC: 3.7 MMOL/L — SIGNIFICANT CHANGE UP (ref 3.5–5.3)
POTASSIUM SERPL-SCNC: 3.7 MMOL/L — SIGNIFICANT CHANGE UP (ref 3.5–5.3)
PROT SERPL-MCNC: 6 G/DL — SIGNIFICANT CHANGE UP (ref 6–8.3)
PROT SERPL-MCNC: 6.3 G/DL — SIGNIFICANT CHANGE UP (ref 6–8.3)
RBC # BLD: 3.38 M/UL — LOW (ref 4.2–5.8)
RBC # FLD: 13 % — SIGNIFICANT CHANGE UP (ref 10.3–14.5)
SODIUM SERPL-SCNC: 139 MMOL/L — SIGNIFICANT CHANGE UP (ref 135–145)
WBC # BLD: 5.02 K/UL — SIGNIFICANT CHANGE UP (ref 3.8–10.5)
WBC # FLD AUTO: 5.02 K/UL — SIGNIFICANT CHANGE UP (ref 3.8–10.5)

## 2022-10-17 PROCEDURE — 99232 SBSQ HOSP IP/OBS MODERATE 35: CPT | Mod: GC

## 2022-10-17 RX ADMIN — FINASTERIDE 5 MILLIGRAM(S): 5 TABLET, FILM COATED ORAL at 21:30

## 2022-10-17 RX ADMIN — APIXABAN 5 MILLIGRAM(S): 2.5 TABLET, FILM COATED ORAL at 05:24

## 2022-10-17 RX ADMIN — APIXABAN 5 MILLIGRAM(S): 2.5 TABLET, FILM COATED ORAL at 17:01

## 2022-10-17 RX ADMIN — REMDESIVIR 500 MILLIGRAM(S): 5 INJECTION INTRAVENOUS at 02:31

## 2022-10-17 RX ADMIN — Medication 25 MILLIGRAM(S): at 17:01

## 2022-10-17 RX ADMIN — Medication 25 MILLIGRAM(S): at 05:25

## 2022-10-17 RX ADMIN — SENNA PLUS 2 TABLET(S): 8.6 TABLET ORAL at 21:30

## 2022-10-17 RX ADMIN — POLYETHYLENE GLYCOL 3350 17 GRAM(S): 17 POWDER, FOR SOLUTION ORAL at 11:06

## 2022-10-17 NOTE — PROGRESS NOTE ADULT - PROBLEM SELECTOR PLAN 5
- On admission, BUN/Cr: 26/1.2  baseline 0.7-0.8; now resolved  - likely prerenal azotemia from decreased PO intake.   - s/p 1 litre NS in ED  - NS at rate of 50 cc/hr x 12 hours   - Avoid nephrotoxic agents  - F/u BMP - On admission, BUN/Cr: 26/1.2  baseline 0.7-0.8; now resolved  - likely prerenal azotemia from decreased PO intake.   - s/p 1 litre NS in ED  - NS at rate of 50 cc/hr x 12 hours given  - Avoid nephrotoxic agents  - F/u BMP

## 2022-10-17 NOTE — PROGRESS NOTE ADULT - PROBLEM SELECTOR PLAN 12
#lung nodules  CT a/p from 10/7 showed Few bilateral sub-pleural based nodules, largest measuring up to 5mm in   left lower lobe. Pulmonary imaging follow-up in one year is advised if   the patient is increased risk for neoplasm.  - Can follow up with pulmonology outpatient.    Eliquis 5mg BID for PE/afib
#lung nodules  CT a/p from 10/7 showed Few bilateral sub-pleural based nodules, largest measuring up to 5mm in   left lower lobe. Pulmonary imaging follow-up in one year is advised if   the patient is increased risk for neoplasm.  - Can follow up with pulmonology outpatient.    Eliquis 5mg BID for PE/afib
Eliquis 5mg BID for PE/afib

## 2022-10-17 NOTE — PROGRESS NOTE ADULT - PROBLEM SELECTOR PLAN 7
- AST/ALT elevation 68/122 -- overall improving from last admission  - possibly pt had congestive hepatopathy in setting of acute submassive PE with RV strain and has been improving since being on A/C  - monitor closely  - consider GI consult if worsening  - hold zocor for now - AST/ALT elevation -- overall improving from last admission  - possibly pt had congestive hepatopathy in setting of acute submassive PE with RV strain and has been improving since being on A/C  - monitor closely  - consider GI consult if worsening  - hold zocor for now

## 2022-10-17 NOTE — PROGRESS NOTE ADULT - NUTRITIONAL ASSESSMENT
This patient has been assessed with a concern for Malnutrition and has been determined to have a diagnosis/diagnoses of Severe protein-calorie malnutrition.    This patient is being managed with:   Diet Regular-  Low Sodium  Supplement Feeding Modality:  Oral  Ensure Enlive Cans or Servings Per Day:  1       Frequency:  Two Times a day  Entered: Oct 16 2022 12:58PM    Diet Regular-  DASH/TLC {Sodium & Cholesterol Restricted}  Entered: Oct 15 2022  1:23AM    The following pending diet order is being considered for treatment of Severe protein-calorie malnutrition:null

## 2022-10-17 NOTE — PROGRESS NOTE ADULT - PROBLEM SELECTOR PLAN 11
Patient states he has not had a bowel movement in 2 weeks however states this is normal for him. On further questioning he states he may be misremembering. Denies constipation. On exam, abdomen is soft/nontender/nondistended.  - will start bowel regimen with senna/miralax.    #Dementia:  - Chronic   - Pt is on home Rivastigmine patch QD. Patient states he has not had a bowel movement in 2 weeks however states this is normal for him. On further questioning he states he may be misremembering. Denies constipation. On exam, abdomen is soft/nontender/nondistended.  - bowel regimen with senna/miralax.    #Dementia:  - Chronic   - Pt is on home Rivastigmine patch QD.

## 2022-10-17 NOTE — PROGRESS NOTE ADULT - TIME BILLING
Chart, meds, labs, vitals reviewed.
Pt seen + examined. Case discussed with resident. Agree with assessment and plan above (edited by me in detail):  Time spent: 30min. More than 50% of the visit was spent counseling the patient on medical condition -- COVID infection, Remdesivir, hemoptysis, PE, afib.     86yo M with PMHx of HLD, BPH, dementia, recent admission for submassive PE c/b new onset afib with RVR presents to the ED with hemoptysis and weakness a/w COVID infection and hemoptysis on Eliquis.    - Pt presenting with non-specific symptoms of generalized weakness and confusion, with mild URI symptoms, cough with fever, found to be COVID positive  - was reportedly hypoxic <89% on RA, on 2litre NC but no clear documentation and is currently 94% on RA on exertion after working with physical therapy -- discussed with ID and will d/c decadron   - CXR;  negative - No pneumothorax, No opacities, No free air.  - Given Tylenol and IV fluid 1litre NS in ER   - Will continue remdesivir for 3-5 days depending on when has availability to transfer to [facility]   - Continue with supportive care (Tylenol, Robitussin)  - Continuos remote pulse ox monitoring  - procal .1, D-Dimer 798, , CRP 36, Ferritin 822 levels in AM  - PT/OT/SW consulted for generalized weakness, impaired ambulation and ADLs--> PT recommends [facility].  - ID consulted- Dr. Rubio, recs appreciated    - pt a/w hemoptysis In the setting of known PE on AC  - no significant hemoptysis during this hospitalization, however, will continue to monitor closely    - Recent admission for PE with right heart strain  - continue therapeutic AC with apixaban  - monitor closely for any significant hemoptysis    - AST/ALT elevation -- overall improving from last admission  - possibly pt had congestive hepatopathy in setting of acute submassive PE with RV strain and has been improving since being on A/C  - monitor closely  - consider GI consult if worsening  - hold zocor for now    Disp: d/c to [facility] when has bed/auth -- SW following
Pt seen + examined. Case discussed with resident. Agree with assessment and plan above (edited by me in detail):  Time spent: 40min. More than 50% of the visit was spent counseling the patient and pt's son on medical condition -- COVID infection, Remdesivir, hemoptysis, PE, afib.     88yo M with PMHx of HLD, BPH, dementia, recent admission for submassive PE c/b new onset afib with RVR presents to the ED with hemoptysis and weakness a/w COVID infection and hemoptysis on Eliquis.    - Pt presenting with non-specific symptoms of generalized weakness and confusion, with mild URI symptoms, cough with fever , found to be COVID positive  - was reportedly hypoxic <89% on RA, on 2litre NC but no clear documentation and is currently 94% on RA on exertion after working with physical therapy -- discussed with ID and will d/c decadron   - CXR;  negative - No pneumothorax, No opacities, No free air.  - Given Tylenol and IV fluid 1litre NS in ER   - Continue remdesivir for 3-5 days  - Continue with supportive care (Tylenol, Robitussin)  - Continuos remote pulse ox monitoring  - procal .1, D-Dimer 798, , CRP 36, Ferritin 822 levels in AM  - PT/OT/SW consulted for generalized weakness, impaired ambulation and ADLs--> PT recommends [facility].  - ID consulted- Dr. Rubio, recs appreciated    - pt a/w hemoptysis In the setting of known PE on AC  - no significant hemoptysis during this hospitalization, however, will continue to monitor closely  - f/up pulm, Dr. Orozco consult    - Recent admission for PE with right heart strain  - continue therapeutic AC with apixaban  - monitor closely for any significant hemoptysis    - AST/ALT elevation 68/122 -- overall improving from last admission  - possibly pt had congestive hepatopathy in setting of acute submassive PE with RV strain and has been improving since being on A/C  - monitor closely  - consider GI consult if worsening  - hold zocor for now

## 2022-10-17 NOTE — PROGRESS NOTE ADULT - ATTENDING COMMENTS
Patient seen and examined personally by attending. Physical exam findings and plan as noted above and edited where appropriate
see below
see below

## 2022-10-17 NOTE — PROGRESS NOTE ADULT - PROBLEM SELECTOR PLAN 2
- pt a/w hemoptysis In the setting of known PE on AC  - no significant hemoptysis during this hospitalization, however, will continue to monitor closely  - f/up pulm, Dr. Orozco consult - pt a/w hemoptysis In the setting of known PE on AC  - no significant hemoptysis during this hospitalization, however, will continue to monitor closely

## 2022-10-17 NOTE — PROGRESS NOTE ADULT - PROBLEM SELECTOR PLAN 1
- Pt presenting with non-specific symptoms of generalized weakness and confusion, with mild URI symptoms, cough with fever , found to be COVID positive  - was reportedly hypoxic <89% on RA, on 2litre NC but no clear documentation and is currently 94% on RA on exertion after working with physical therapy -- discussed with ID and will d/c decadron   - CXR;  negative - No pneumothorax, No opacities, No free air.  - Given Tylenol and IV fluid 1litre NS in ER   - Will continue remdesivir for today and tomorrow, likely discontinue after tomorrow's dose.   - Continue with supportive care (Tylenol, Robitussin)  - Continuos remote pulse ox monitoring  - procal .1, D-Dimer 798, , CRP 36, Ferritin 822 levels in AM  - PT/OT/SW consulted for generalized weakness, impaired ambulation and ADLs--> PT recommends [facility].  - ID consulted- Dr. Rubio, recs appreciated: Can discontinue Remdsivir if pt doing well with rm air, completed dose #3 on 10/17 - Pt presenting with non-specific symptoms of generalized weakness and confusion, with mild URI symptoms, cough with fever, found to be COVID positive  - was reportedly hypoxic <89% on RA, on 2litre NC but no clear documentation and is currently 94% on RA on exertion after working with physical therapy -- discussed with ID and will d/c decadron   - CXR;  negative - No pneumothorax, No opacities, No free air.  - Given Tylenol and IV fluid 1litre NS in ER   - Will continue remdesivir for 3-5 days depending on when has availability to transfer to [facility]   - Continue with supportive care (Tylenol, Robitussin)  - Continuos remote pulse ox monitoring  - procal .1, D-Dimer 798, , CRP 36, Ferritin 822 levels in AM  - PT/OT/SW consulted for generalized weakness, impaired ambulation and ADLs--> PT recommends [facility].  - ID consulted- Dr. Rubio, recs appreciated

## 2022-10-17 NOTE — PROGRESS NOTE ADULT - PROBLEM SELECTOR PLAN 9
Chronic, on home Finasteride 5 mg QD  - on last admission CT A/P: Heterogeneously enlarged prostate, cause mass effect and protrusion at the bladder base.   - recommended to follow up outpt with workup to exclude underlying prostatic malignancy.

## 2022-10-17 NOTE — PROGRESS NOTE ADULT - SUBJECTIVE AND OBJECTIVE BOX
Patient is a 87y old  Male who presents with a chief complaint of Weakness     (16 Oct 2022 11:51)      INTERVAL HPI/OVERNIGHT EVENTS: Pt seen and examined at the bedside this AM. He complains of some coughing and unsteadiness with walking that has not worsened since yesterday. Denies new fevers, chills, CP, SOB, abd pain, N/V, headaches, dizziness, bleeding events.     MEDICATIONS  (STANDING):  apixaban 5 milliGRAM(s) Oral every 12 hours  finasteride 5 milliGRAM(s) Oral daily  influenza  Vaccine (HIGH DOSE) 0.7 milliLiter(s) IntraMuscular once  metoprolol tartrate 25 milliGRAM(s) Oral two times a day  polyethylene glycol 3350 17 Gram(s) Oral daily  remdesivir  IVPB   IV Intermittent   remdesivir  IVPB 100 milliGRAM(s) IV Intermittent every 24 hours  senna 2 Tablet(s) Oral at bedtime  sodium chloride 0.9%. 1000 milliLiter(s) (50 mL/Hr) IV Continuous <Continuous>    MEDICATIONS  (PRN):  acetaminophen     Tablet .. 650 milliGRAM(s) Oral every 6 hours PRN Temp greater or equal to 38C (100.4F), Mild Pain (1 - 3)  guaiFENesin Oral Liquid (Sugar-Free) 200 milliGRAM(s) Oral every 6 hours PRN Cough  melatonin 3 milliGRAM(s) Oral at bedtime PRN Insomnia      Allergies    No Known Allergies    Intolerances    REVIEW OF SYSTEMS:  CONSTITUTIONAL: No fever or chills  HEENT:  No headache, no sore throat  RESPIRATORY: +cough, no wheezing, or shortness of breath  CARDIOVASCULAR: No chest pain, palpitations  GASTROINTESTINAL: No abd pain, nausea, vomiting, or diarrhea  GENITOURINARY: No dysuria, frequency, or hematuria  NEUROLOGICAL: no focal weakness or dizziness  MUSCULOSKELETAL: no myalgias     Vital Signs Last 24 Hrs  T(C): 37.2 (17 Oct 2022 05:01), Max: 37.8 (16 Oct 2022 19:30)  T(F): 98.9 (17 Oct 2022 05:01), Max: 100 (16 Oct 2022 19:30)  HR: 70 (17 Oct 2022 05:01) (59 - 78)  BP: 130/77 (17 Oct 2022 05:01) (124/76 - 130/77)  BP(mean): --  RR: 18 (17 Oct 2022 05:01) (16 - 18)  SpO2: 94% (17 Oct 2022 05:01) (93% - 95%)    Parameters below as of 17 Oct 2022 05:01  Patient On (Oxygen Delivery Method): room air    PHYSICAL EXAM:  GENERAL: NAD, lying comfortably in bed  HEENT:  anicteric, moist mucous membranes  CHEST/LUNG:  coughing intermittently with deep breaths, otherwise CTA b/l, no rales, wheezes, or rhonchi  HEART:  RRR, S1, S2  ABDOMEN:  BS+, soft, nontender, nondistended  EXTREMITIES: no edema, cyanosis, or calf tenderness  NERVOUS SYSTEM: answers questions and follows commands appropriately  LABS:                        10.0   5.02  )-----------( 236      ( 17 Oct 2022 06:02 )             30.4     CBC Full  -  ( 17 Oct 2022 06:02 )  WBC Count : 5.02 K/uL  Hemoglobin : 10.0 g/dL  Hematocrit : 30.4 %  Platelet Count - Automated : 236 K/uL  Mean Cell Volume : 89.9 fl  Mean Cell Hemoglobin : 29.6 pg  Mean Cell Hemoglobin Concentration : 32.9 gm/dL  Auto Neutrophil # : 2.93 K/uL  Auto Lymphocyte # : 1.53 K/uL  Auto Monocyte # : 0.53 K/uL  Auto Eosinophil # : 0.00 K/uL  Auto Basophil # : 0.00 K/uL  Auto Neutrophil % : 58.3 %  Auto Lymphocyte % : 30.5 %  Auto Monocyte % : 10.6 %  Auto Eosinophil % : 0.0 %  Auto Basophil % : 0.0 %    17 Oct 2022 06:02    139    |  105    |  14     ----------------------------<  93     3.7     |  25     |  0.66     Ca    8.3        17 Oct 2022 06:02  Phos  3.1       17 Oct 2022 06:02  Mg     2.1       17 Oct 2022 06:02    TPro  6.0    /  Alb  2.3    /  TBili  0.6    /  DBili  0.3    /  AST  88     /  ALT  123    /  AlkPhos  67     17 Oct 2022 06:02        CAPILLARY BLOOD GLUCOSE            Culture - Urine (collected 10-14-22 @ 22:15)  Source: Clean Catch Clean Catch (Midstream)  Final Report (10-16-22 @ 07:34):    <10,000 CFU/mL Normal Urogenital Shena    Culture - Blood (collected 10-14-22 @ 21:50)  Source: .Blood Blood-Peripheral  Preliminary Report (10-16-22 @ 01:02):    No growth to date.    Culture - Blood (collected 10-14-22 @ 21:45)  Source: .Blood Blood-Peripheral  Preliminary Report (10-16-22 @ 01:02):    No growth to date.        RADIOLOGY & ADDITIONAL TESTS:    Personally reviewed.     Consultant(s) Notes Reviewed:  [x] YES  [ ] NO Patient is a 87y old  Male who presents with a chief complaint of weakness, hemoptysis.      INTERVAL HPI/OVERNIGHT EVENTS: Pt seen and examined at the bedside this AM. He complains of some coughing and unsteadiness with walking that has not worsened since yesterday. No hemoptysis per pt and per nursing report. Denies new fevers, chills, CP, SOB, abd pain, N/V, headaches, dizziness, bleeding events.     MEDICATIONS  (STANDING):  apixaban 5 milliGRAM(s) Oral every 12 hours  finasteride 5 milliGRAM(s) Oral daily  influenza  Vaccine (HIGH DOSE) 0.7 milliLiter(s) IntraMuscular once  metoprolol tartrate 25 milliGRAM(s) Oral two times a day  polyethylene glycol 3350 17 Gram(s) Oral daily  remdesivir  IVPB   IV Intermittent   remdesivir  IVPB 100 milliGRAM(s) IV Intermittent every 24 hours  senna 2 Tablet(s) Oral at bedtime  sodium chloride 0.9%. 1000 milliLiter(s) (50 mL/Hr) IV Continuous <Continuous>    MEDICATIONS  (PRN):  acetaminophen     Tablet .. 650 milliGRAM(s) Oral every 6 hours PRN Temp greater or equal to 38C (100.4F), Mild Pain (1 - 3)  guaiFENesin Oral Liquid (Sugar-Free) 200 milliGRAM(s) Oral every 6 hours PRN Cough  melatonin 3 milliGRAM(s) Oral at bedtime PRN Insomnia      Allergies    No Known Allergies    Intolerances    REVIEW OF SYSTEMS:  CONSTITUTIONAL: No fever or chills  HEENT:  No headache, no sore throat  RESPIRATORY: +cough, no wheezing, or shortness of breath, no hemoptysis  CARDIOVASCULAR: No chest pain, palpitations  GASTROINTESTINAL: No abd pain, nausea, vomiting, or diarrhea  GENITOURINARY: No dysuria, frequency, or hematuria  NEUROLOGICAL: no focal weakness or dizziness  MUSCULOSKELETAL: no myalgias     Vital Signs Last 24 Hrs  T(C): 37.2 (17 Oct 2022 05:01), Max: 37.8 (16 Oct 2022 19:30)  T(F): 98.9 (17 Oct 2022 05:01), Max: 100 (16 Oct 2022 19:30)  HR: 70 (17 Oct 2022 05:01) (59 - 78)  BP: 130/77 (17 Oct 2022 05:01) (124/76 - 130/77)  BP(mean): --  RR: 18 (17 Oct 2022 05:01) (16 - 18)  SpO2: 94% (17 Oct 2022 05:01) (93% - 95%)    Parameters below as of 17 Oct 2022 05:01  Patient On (Oxygen Delivery Method): room air    PHYSICAL EXAM:  GENERAL: NAD, lying comfortably in bed  HEENT:  anicteric, moist mucous membranes  CHEST/LUNG:  coughing intermittently with deep breaths, decreased breath sounds, overall CTA b/l, no rales, wheezes, or rhonchi  HEART:  RRR, S1, S2  ABDOMEN:  BS+, soft, nontender, nondistended  EXTREMITIES: no edema, cyanosis, or calf tenderness  NERVOUS SYSTEM: answers simple questions and follows commands appropriately    LABS:                        10.0   5.02  )-----------( 236      ( 17 Oct 2022 06:02 )             30.4     CBC Full  -  ( 17 Oct 2022 06:02 )  WBC Count : 5.02 K/uL  Hemoglobin : 10.0 g/dL  Hematocrit : 30.4 %  Platelet Count - Automated : 236 K/uL  Mean Cell Volume : 89.9 fl  Mean Cell Hemoglobin : 29.6 pg  Mean Cell Hemoglobin Concentration : 32.9 gm/dL  Auto Neutrophil # : 2.93 K/uL  Auto Lymphocyte # : 1.53 K/uL  Auto Monocyte # : 0.53 K/uL  Auto Eosinophil # : 0.00 K/uL  Auto Basophil # : 0.00 K/uL  Auto Neutrophil % : 58.3 %  Auto Lymphocyte % : 30.5 %  Auto Monocyte % : 10.6 %  Auto Eosinophil % : 0.0 %  Auto Basophil % : 0.0 %    17 Oct 2022 06:02    139    |  105    |  14     ----------------------------<  93     3.7     |  25     |  0.66     Ca    8.3        17 Oct 2022 06:02  Phos  3.1       17 Oct 2022 06:02  Mg     2.1       17 Oct 2022 06:02    TPro  6.0    /  Alb  2.3    /  TBili  0.6    /  DBili  0.3    /  AST  88     /  ALT  123    /  AlkPhos  67     17 Oct 2022 06:02        CAPILLARY BLOOD GLUCOSE            Culture - Urine (collected 10-14-22 @ 22:15)  Source: Clean Catch Clean Catch (Midstream)  Final Report (10-16-22 @ 07:34):    <10,000 CFU/mL Normal Urogenital Shena    Culture - Blood (collected 10-14-22 @ 21:50)  Source: .Blood Blood-Peripheral  Preliminary Report (10-16-22 @ 01:02):    No growth to date.    Culture - Blood (collected 10-14-22 @ 21:45)  Source: .Blood Blood-Peripheral  Preliminary Report (10-16-22 @ 01:02):    No growth to date.        RADIOLOGY & ADDITIONAL TESTS:    Personally reviewed.     Consultant(s) Notes Reviewed:  [x] YES  [ ] NO

## 2022-10-18 ENCOUNTER — TRANSCRIPTION ENCOUNTER (OUTPATIENT)
Age: 87
End: 2022-10-18

## 2022-10-18 VITALS
OXYGEN SATURATION: 97 % | TEMPERATURE: 98 F | HEART RATE: 57 BPM | SYSTOLIC BLOOD PRESSURE: 117 MMHG | RESPIRATION RATE: 18 BRPM | DIASTOLIC BLOOD PRESSURE: 74 MMHG

## 2022-10-18 LAB
ALBUMIN SERPL ELPH-MCNC: 2.3 G/DL — LOW (ref 3.3–5)
ALP SERPL-CCNC: 73 U/L — SIGNIFICANT CHANGE UP (ref 40–120)
ALP SERPL-CCNC: 77 U/L — SIGNIFICANT CHANGE UP (ref 40–120)
ALT FLD-CCNC: 129 U/L — HIGH (ref 12–78)
ALT FLD-CCNC: 131 U/L — HIGH (ref 12–78)
ANION GAP SERPL CALC-SCNC: 8 MMOL/L — SIGNIFICANT CHANGE UP (ref 5–17)
AST SERPL-CCNC: 85 U/L — HIGH (ref 15–37)
AST SERPL-CCNC: 86 U/L — HIGH (ref 15–37)
BASOPHILS # BLD AUTO: 0 K/UL — SIGNIFICANT CHANGE UP (ref 0–0.2)
BASOPHILS NFR BLD AUTO: 0 % — SIGNIFICANT CHANGE UP (ref 0–2)
BILIRUB DIRECT SERPL-MCNC: 0.3 MG/DL — SIGNIFICANT CHANGE UP (ref 0–0.3)
BILIRUB INDIRECT FLD-MCNC: 0.3 MG/DL — SIGNIFICANT CHANGE UP (ref 0.2–1)
BILIRUB SERPL-MCNC: 0.6 MG/DL — SIGNIFICANT CHANGE UP (ref 0.2–1.2)
BUN SERPL-MCNC: 16 MG/DL — SIGNIFICANT CHANGE UP (ref 7–23)
CALCIUM SERPL-MCNC: 8.2 MG/DL — LOW (ref 8.5–10.1)
CHLORIDE SERPL-SCNC: 108 MMOL/L — SIGNIFICANT CHANGE UP (ref 96–108)
CO2 SERPL-SCNC: 25 MMOL/L — SIGNIFICANT CHANGE UP (ref 22–31)
CREAT SERPL-MCNC: 0.69 MG/DL — SIGNIFICANT CHANGE UP (ref 0.5–1.3)
EGFR: 90 ML/MIN/1.73M2 — SIGNIFICANT CHANGE UP
EOSINOPHIL # BLD AUTO: 0 K/UL — SIGNIFICANT CHANGE UP (ref 0–0.5)
EOSINOPHIL NFR BLD AUTO: 0 % — SIGNIFICANT CHANGE UP (ref 0–6)
GLUCOSE SERPL-MCNC: 96 MG/DL — SIGNIFICANT CHANGE UP (ref 70–99)
HCT VFR BLD CALC: 32.1 % — LOW (ref 39–50)
HGB BLD-MCNC: 10.3 G/DL — LOW (ref 13–17)
IMM GRANULOCYTES NFR BLD AUTO: 0.5 % — SIGNIFICANT CHANGE UP (ref 0–0.9)
LYMPHOCYTES # BLD AUTO: 1.65 K/UL — SIGNIFICANT CHANGE UP (ref 1–3.3)
LYMPHOCYTES # BLD AUTO: 37.2 % — SIGNIFICANT CHANGE UP (ref 13–44)
MCHC RBC-ENTMCNC: 29.1 PG — SIGNIFICANT CHANGE UP (ref 27–34)
MCHC RBC-ENTMCNC: 32.1 GM/DL — SIGNIFICANT CHANGE UP (ref 32–36)
MCV RBC AUTO: 90.7 FL — SIGNIFICANT CHANGE UP (ref 80–100)
MONOCYTES # BLD AUTO: 0.44 K/UL — SIGNIFICANT CHANGE UP (ref 0–0.9)
MONOCYTES NFR BLD AUTO: 9.9 % — SIGNIFICANT CHANGE UP (ref 2–14)
NEUTROPHILS # BLD AUTO: 2.33 K/UL — SIGNIFICANT CHANGE UP (ref 1.8–7.4)
NEUTROPHILS NFR BLD AUTO: 52.4 % — SIGNIFICANT CHANGE UP (ref 43–77)
NRBC # BLD: 0 /100 WBCS — SIGNIFICANT CHANGE UP (ref 0–0)
PLATELET # BLD AUTO: 249 K/UL — SIGNIFICANT CHANGE UP (ref 150–400)
POTASSIUM SERPL-MCNC: 3.8 MMOL/L — SIGNIFICANT CHANGE UP (ref 3.5–5.3)
POTASSIUM SERPL-SCNC: 3.8 MMOL/L — SIGNIFICANT CHANGE UP (ref 3.5–5.3)
PROT SERPL-MCNC: 6.1 G/DL — SIGNIFICANT CHANGE UP (ref 6–8.3)
PROT SERPL-MCNC: 6.2 G/DL — SIGNIFICANT CHANGE UP (ref 6–8.3)
RBC # BLD: 3.54 M/UL — LOW (ref 4.2–5.8)
RBC # FLD: 12.9 % — SIGNIFICANT CHANGE UP (ref 10.3–14.5)
SODIUM SERPL-SCNC: 141 MMOL/L — SIGNIFICANT CHANGE UP (ref 135–145)
WBC # BLD: 4.44 K/UL — SIGNIFICANT CHANGE UP (ref 3.8–10.5)
WBC # FLD AUTO: 4.44 K/UL — SIGNIFICANT CHANGE UP (ref 3.8–10.5)

## 2022-10-18 PROCEDURE — 83615 LACTATE (LD) (LDH) ENZYME: CPT

## 2022-10-18 PROCEDURE — 71045 X-RAY EXAM CHEST 1 VIEW: CPT

## 2022-10-18 PROCEDURE — 82728 ASSAY OF FERRITIN: CPT

## 2022-10-18 PROCEDURE — 97162 PT EVAL MOD COMPLEX 30 MIN: CPT

## 2022-10-18 PROCEDURE — 87086 URINE CULTURE/COLONY COUNT: CPT

## 2022-10-18 PROCEDURE — 83735 ASSAY OF MAGNESIUM: CPT

## 2022-10-18 PROCEDURE — 83036 HEMOGLOBIN GLYCOSYLATED A1C: CPT

## 2022-10-18 PROCEDURE — 86140 C-REACTIVE PROTEIN: CPT

## 2022-10-18 PROCEDURE — 85730 THROMBOPLASTIN TIME PARTIAL: CPT

## 2022-10-18 PROCEDURE — 80076 HEPATIC FUNCTION PANEL: CPT

## 2022-10-18 PROCEDURE — 87040 BLOOD CULTURE FOR BACTERIA: CPT

## 2022-10-18 PROCEDURE — 36415 COLL VENOUS BLD VENIPUNCTURE: CPT

## 2022-10-18 PROCEDURE — 80061 LIPID PANEL: CPT

## 2022-10-18 PROCEDURE — 85610 PROTHROMBIN TIME: CPT

## 2022-10-18 PROCEDURE — 80053 COMPREHEN METABOLIC PANEL: CPT

## 2022-10-18 PROCEDURE — 84145 PROCALCITONIN (PCT): CPT

## 2022-10-18 PROCEDURE — 83605 ASSAY OF LACTIC ACID: CPT

## 2022-10-18 PROCEDURE — 0225U NFCT DS DNA&RNA 21 SARSCOV2: CPT

## 2022-10-18 PROCEDURE — 82565 ASSAY OF CREATININE: CPT

## 2022-10-18 PROCEDURE — 85025 COMPLETE CBC W/AUTO DIFF WBC: CPT

## 2022-10-18 PROCEDURE — 93005 ELECTROCARDIOGRAM TRACING: CPT

## 2022-10-18 PROCEDURE — 99239 HOSP IP/OBS DSCHRG MGMT >30: CPT

## 2022-10-18 PROCEDURE — 84100 ASSAY OF PHOSPHORUS: CPT

## 2022-10-18 PROCEDURE — 87635 SARS-COV-2 COVID-19 AMP PRB: CPT

## 2022-10-18 PROCEDURE — 85379 FIBRIN DEGRADATION QUANT: CPT

## 2022-10-18 PROCEDURE — 81001 URINALYSIS AUTO W/SCOPE: CPT

## 2022-10-18 PROCEDURE — 99285 EMERGENCY DEPT VISIT HI MDM: CPT

## 2022-10-18 RX ORDER — SENNA PLUS 8.6 MG/1
2 TABLET ORAL
Qty: 0 | Refills: 0 | DISCHARGE
Start: 2022-10-18

## 2022-10-18 RX ORDER — MULTIVIT-MIN/FERROUS GLUCONATE 9 MG/15 ML
1 LIQUID (ML) ORAL
Qty: 0 | Refills: 0 | DISCHARGE
Start: 2022-10-18

## 2022-10-18 RX ADMIN — Medication 25 MILLIGRAM(S): at 05:35

## 2022-10-18 RX ADMIN — FINASTERIDE 5 MILLIGRAM(S): 5 TABLET, FILM COATED ORAL at 11:44

## 2022-10-18 RX ADMIN — REMDESIVIR 500 MILLIGRAM(S): 5 INJECTION INTRAVENOUS at 02:49

## 2022-10-18 RX ADMIN — APIXABAN 5 MILLIGRAM(S): 2.5 TABLET, FILM COATED ORAL at 05:35

## 2022-10-18 NOTE — DISCHARGE NOTE NURSING/CASE MANAGEMENT/SOCIAL WORK - PATIENT PORTAL LINK FT
You can access the FollowMyHealth Patient Portal offered by [facility] by registering at the following website: [email or web address]. By joining [employer]s FollowMyHealth portal, you will also be able to view your health information using other applications (apps) compatible with our system.

## 2022-10-20 LAB
CULTURE RESULTS: SIGNIFICANT CHANGE UP
SPECIMEN SOURCE: SIGNIFICANT CHANGE UP

## 2022-10-20 PROCEDURE — 97112 NEUROMUSCULAR REEDUCATION: CPT

## 2022-10-20 PROCEDURE — 97116 GAIT TRAINING THERAPY: CPT

## 2022-10-20 PROCEDURE — 71045 X-RAY EXAM CHEST 1 VIEW: CPT

## 2022-10-20 PROCEDURE — 70450 CT HEAD/BRAIN W/O DYE: CPT | Mod: MA

## 2022-10-20 PROCEDURE — 96374 THER/PROPH/DIAG INJ IV PUSH: CPT

## 2022-10-20 PROCEDURE — 84484 ASSAY OF TROPONIN QUANT: CPT

## 2022-10-20 PROCEDURE — G1004: CPT

## 2022-10-20 PROCEDURE — 80053 COMPREHEN METABOLIC PANEL: CPT

## 2022-10-20 PROCEDURE — 85025 COMPLETE CBC W/AUTO DIFF WBC: CPT

## 2022-10-20 PROCEDURE — 93005 ELECTROCARDIOGRAM TRACING: CPT

## 2022-10-20 PROCEDURE — G0103: CPT

## 2022-10-20 PROCEDURE — 85730 THROMBOPLASTIN TIME PARTIAL: CPT

## 2022-10-20 PROCEDURE — 84100 ASSAY OF PHOSPHORUS: CPT

## 2022-10-20 PROCEDURE — 87637 SARSCOV2&INF A&B&RSV AMP PRB: CPT

## 2022-10-20 PROCEDURE — 97110 THERAPEUTIC EXERCISES: CPT

## 2022-10-20 PROCEDURE — 71275 CT ANGIOGRAPHY CHEST: CPT | Mod: MA

## 2022-10-20 PROCEDURE — 83735 ASSAY OF MAGNESIUM: CPT

## 2022-10-20 PROCEDURE — 87040 BLOOD CULTURE FOR BACTERIA: CPT

## 2022-10-20 PROCEDURE — 83690 ASSAY OF LIPASE: CPT

## 2022-10-20 PROCEDURE — 93970 EXTREMITY STUDY: CPT

## 2022-10-20 PROCEDURE — 80048 BASIC METABOLIC PNL TOTAL CA: CPT

## 2022-10-20 PROCEDURE — 85379 FIBRIN DEGRADATION QUANT: CPT

## 2022-10-20 PROCEDURE — 97162 PT EVAL MOD COMPLEX 30 MIN: CPT

## 2022-10-20 PROCEDURE — 85610 PROTHROMBIN TIME: CPT

## 2022-10-20 PROCEDURE — 81001 URINALYSIS AUTO W/SCOPE: CPT

## 2022-10-20 PROCEDURE — C8929: CPT

## 2022-10-20 PROCEDURE — 83880 ASSAY OF NATRIURETIC PEPTIDE: CPT

## 2022-10-20 PROCEDURE — 74177 CT ABD & PELVIS W/CONTRAST: CPT | Mod: ME

## 2022-10-20 PROCEDURE — 85027 COMPLETE CBC AUTOMATED: CPT

## 2022-10-20 PROCEDURE — 83605 ASSAY OF LACTIC ACID: CPT

## 2022-10-20 PROCEDURE — 99291 CRITICAL CARE FIRST HOUR: CPT

## 2022-10-20 PROCEDURE — 93306 TTE W/DOPPLER COMPLETE: CPT

## 2022-10-20 PROCEDURE — 36415 COLL VENOUS BLD VENIPUNCTURE: CPT

## 2022-10-25 ENCOUNTER — APPOINTMENT (OUTPATIENT)
Dept: CARDIOLOGY | Facility: CLINIC | Age: 87
End: 2022-10-25

## 2022-10-25 NOTE — PROGRESS NOTE ADULT - PROBLEM/PLAN-12
DISPLAY PLAN FREE TEXT
This was a shared visit with the KENNEDY. I reviewed and verified the documentation and independently performed the documented:

## 2022-11-30 ENCOUNTER — APPOINTMENT (OUTPATIENT)
Dept: CARDIOLOGY | Facility: CLINIC | Age: 87
End: 2022-11-30

## 2022-12-02 ENCOUNTER — LABORATORY RESULT (OUTPATIENT)
Age: 87
End: 2022-12-02

## 2022-12-02 ENCOUNTER — APPOINTMENT (OUTPATIENT)
Dept: CARDIOLOGY | Facility: CLINIC | Age: 87
End: 2022-12-02
Payer: MEDICARE

## 2022-12-02 ENCOUNTER — NON-APPOINTMENT (OUTPATIENT)
Age: 87
End: 2022-12-02

## 2022-12-02 VITALS
RESPIRATION RATE: 16 BRPM | TEMPERATURE: 97.8 F | HEIGHT: 70 IN | SYSTOLIC BLOOD PRESSURE: 116 MMHG | HEART RATE: 55 BPM | WEIGHT: 190 LBS | DIASTOLIC BLOOD PRESSURE: 74 MMHG | BODY MASS INDEX: 27.2 KG/M2 | OXYGEN SATURATION: 98 %

## 2022-12-02 PROCEDURE — 93246 EXT ECG>7D<15D RECORDING: CPT

## 2022-12-02 PROCEDURE — 93000 ELECTROCARDIOGRAM COMPLETE: CPT

## 2022-12-02 PROCEDURE — 99204 OFFICE O/P NEW MOD 45 MIN: CPT

## 2022-12-05 NOTE — REASON FOR VISIT
[CV Risk Factors and Non-Cardiac Disease] : CV risk factors and non-cardiac disease [FreeTextEntry1] : Patient is a 87- year old male with past medical history of pulmonary embolism, atrial fibrillation, status- post Covid infection in October 2022 presents for a cardiac evaluation. Patient is accompanied by his wife today and he states that he was admitted to [facility] on October 6th for symptoms of stabbing chest pain and fatigue that began a couple of days prior to the hospital admission. He states that he previously went to the urgent care where they diagnosed him with possible pneumonia, but the patient continued experiencing symptoms and went to see his PCP where he was told to go to the ER. Patient states he was diagnosed with pulmonary embolism in the hospital. EKG from the hospital on 10/7/22 demonstrates Atrial fibrillation with PVCs and the EKG on 10/08/22 shows Atrial flutter. D- dimer assay on 10/06/22 was 3527 ng/mL. CT Angio of the Chest on 10/07/22 demonstrates few bilateral sub- pleural based nodules, largest measuring up to 5 mm in left lower lobe , and 3 mm subpleural based nodule in right middle lobe. Patient was discharged 6 days after December 12th. Patient's wife states that patient was not feeling well and they returned to the hospital where he was diagnosed with Covid infection. They believe he must've gotten infected during his hospital stay. Today, the patient feels generally well. He denies chest pain, heart palpitations, SOB. Patient denies every being a smoker and states drinking alcohol occasionally about 1 beer a month. Patient states he is closely following up with a pulmonologist.\par Metoprolol medication was decreased to 12.5 mg BID on today's visit.

## 2022-12-05 NOTE — DISCUSSION/SUMMARY
[FreeTextEntry1] : This is an 87-year-old male with past medical history significant for pulmonary embolus, atrial fibrillation, status post COVID-19 infection October 2022, who comes in for cardiac consultation.\par He denies chest pain, shortness of breath, dizziness or syncope.\par He has no history of rheumatic fever.  He does not drink excessive caffeine or alcohol.\par Cardiac risk factors include hyperlipidemia (the patient was on Zocor 20 mg every other day).\par The patient was admitted to [facility] on October 6, 2022 with complaints of stabbing chest pain not associated with any particular activity and fatigue that began a few days prior to hospital admission.\par He started out at urgent care where he was diagnosed with possible pneumonia, followed by evaluation by his primary care physician who sent him to the emergency room for further evaluation and treatment..\par  Patient states he was diagnosed with pulmonary embolism in the hospital, and his EKG from the hospital on 10/7/22 demonstrated atrial fibrillation with PVCs, and the EKG on 10/08/22 shows Atrial flutter.\par D- dimer assay on 10/06/22 was 3527 ng/mL.  None\par CT Angio of the Chest on 10/07/22 demonstrates few bilateral sub- pleural based nodules, largest measuring up to 5 mm in left lower lobe , and 3 mm subpleural based nodule in right middle lobe.\par The patient was discharged 6 days after on December 12th.  He was discharged on Eliquis 5 mg twice a day, Toprol tartrate 25 mg twice a day\par \par After hospital discharge, the patient's wife states that patient was not feeling well and they returned to the hospital where he was diagnosed with Covid 19 infection. They believe he must've gotten infected during his hospital stay.\par Today, the patient feels generally well. He denies chest pain, heart palpitations, SOB. Patient denies every being a smoker and states drinking alcohol occasionally about 1 beer a month. Patient states he is closely following up with a pulmonologist.\par Echo Doppler examination done October 7 during hospitalization was limited and revealed physiologic mitral valve regurgitation, moderate tricuspid valve regurgitation, normal left ventricular function with estimated ejection fraction 65%.\par Electrocardiogram done December 2, 2022 demonstrated sinus bradycardia rate of 55 bpm is otherwise remarkable for 1 atrial premature contraction.\par The patient was also complaining of fatigue.\par He will lower his metoprolol to tartrate to 12.5 mg twice per day (taken his 1/2 tablet twice per day).\par Patient understands he must follow-up with his pulmonologist regarding his pulmonary emboli, he had an episode of atrial fibrillation which according to his wife is the first episode of such arrhythmia.  This episode of atrial fibrillation may have been secondary to a pulmonary emboli.\par The patient developed COVID-19 infection after hospital discharge.\par He will continue anticoagulation for now as outlined for his pulmonary embolus protocol.  He will schedule an event monitor to rule out paroxysmal atrial fibrillation.  He will follow-up with me in 1 month.  Blood work will be done for lipid panel, SMA-20.  I have also recommended a follow-up with the electrophysiology team.\par He is instructed to maintain good hydration.\par He will schedule an echo Doppler examination to evaluate her left ventricular function, chamber size, pulmonary pressures, valvular regurgitation, and rule out hypertrophy.\par He will follow-up with me in 4 to 6 weeks.\par

## 2022-12-05 NOTE — PHYSICAL EXAM
[Well Developed] : well developed [Well Nourished] : well nourished [No Acute Distress] : no acute distress [Normal Conjunctiva] : normal conjunctiva [Normal Venous Pressure] : normal venous pressure [No Carotid Bruit] : no carotid bruit [Normal S1, S2] : normal S1, S2 [No Rub] : no rub [5th Left ICS - MCL] : palpated at the 5th LICS in the midclavicular line [Normal] : normal [No Precordial Heave] : no precordial heave was noted [Normal Rate] : normal [Rhythm Regular] : regular [Normal S1] : normal S1 [Normal S2] : normal S2 [No Gallop] : no gallop heard [No Pitting Edema] : no pitting edema present [2+] : left 2+ [No Abnormalities] : the abdominal aorta was not enlarged and no bruit was heard [Clear Lung Fields] : clear lung fields [Good Air Entry] : good air entry [No Respiratory Distress] : no respiratory distress  [Soft] : abdomen soft [Non Tender] : non-tender [No Masses/organomegaly] : no masses/organomegaly [Normal Bowel Sounds] : normal bowel sounds [Normal Gait] : normal gait [No Edema] : no edema [No Cyanosis] : no cyanosis [No Clubbing] : no clubbing [No Varicosities] : no varicosities [No Rash] : no rash [No Skin Lesions] : no skin lesions [Moves all extremities] : moves all extremities [No Focal Deficits] : no focal deficits [Normal Speech] : normal speech [Alert and Oriented] : alert and oriented [Normal memory] : normal memory [S3] : no S3 [S4] : no S4 [II] : a grade 2 [I] : a grade 1 [Right Carotid Bruit] : no bruit heard over the right carotid [Left Carotid Bruit] : no bruit heard over the left carotid [Right Femoral Bruit] : no bruit heard over the right femoral artery [Left Femoral Bruit] : no bruit heard over the left femoral artery

## 2022-12-12 RX ORDER — RIVASTIGMINE 9.5 MG/24H
9.5 PATCH, EXTENDED RELEASE TRANSDERMAL
Refills: 0 | Status: ACTIVE | COMMUNITY
Start: 2022-12-12

## 2022-12-12 RX ORDER — FINASTERIDE 5 MG/1
5 TABLET, FILM COATED ORAL
Refills: 3 | Status: ACTIVE | COMMUNITY
Start: 2022-12-12

## 2022-12-20 ENCOUNTER — APPOINTMENT (OUTPATIENT)
Dept: CARDIOLOGY | Facility: CLINIC | Age: 87
End: 2022-12-20

## 2022-12-20 PROCEDURE — 93248 EXT ECG>7D<15D REV&INTERPJ: CPT

## 2023-01-12 ENCOUNTER — APPOINTMENT (OUTPATIENT)
Dept: CARDIOLOGY | Facility: CLINIC | Age: 88
End: 2023-01-12
Payer: MEDICARE

## 2023-01-12 PROCEDURE — 93306 TTE W/DOPPLER COMPLETE: CPT

## 2023-01-27 ENCOUNTER — APPOINTMENT (OUTPATIENT)
Dept: CARDIOLOGY | Facility: CLINIC | Age: 88
End: 2023-01-27
Payer: MEDICARE

## 2023-01-27 VITALS
WEIGHT: 198 LBS | DIASTOLIC BLOOD PRESSURE: 80 MMHG | OXYGEN SATURATION: 98 % | RESPIRATION RATE: 16 BRPM | HEART RATE: 53 BPM | TEMPERATURE: 97.3 F | BODY MASS INDEX: 26.82 KG/M2 | SYSTOLIC BLOOD PRESSURE: 122 MMHG | HEIGHT: 72 IN

## 2023-01-27 DIAGNOSIS — I26.99 OTHER PULMONARY EMBOLISM W/OUT ACUTE COR PULMONALE: ICD-10-CM

## 2023-01-27 PROCEDURE — 99214 OFFICE O/P EST MOD 30 MIN: CPT

## 2023-01-27 RX ORDER — SIMVASTATIN 20 MG/1
20 TABLET, FILM COATED ORAL
Refills: 0 | Status: COMPLETED | COMMUNITY
Start: 2022-12-12 | End: 2023-01-27

## 2023-01-27 NOTE — PHYSICAL EXAM
[Well Developed] : well developed [Well Nourished] : well nourished [No Acute Distress] : no acute distress [Normal Conjunctiva] : normal conjunctiva [Normal Venous Pressure] : normal venous pressure [No Carotid Bruit] : no carotid bruit [Normal S1, S2] : normal S1, S2 [No Rub] : no rub [5th Left ICS - MCL] : palpated at the 5th LICS in the midclavicular line [Normal] : normal [No Precordial Heave] : no precordial heave was noted [Normal Rate] : normal [Rhythm Regular] : regular [Normal S1] : normal S1 [Normal S2] : normal S2 [No Gallop] : no gallop heard [II] : a grade 2 [I] : a grade 1 [No Pitting Edema] : no pitting edema present [2+] : left 2+ [No Abnormalities] : the abdominal aorta was not enlarged and no bruit was heard [Clear Lung Fields] : clear lung fields [Good Air Entry] : good air entry [No Respiratory Distress] : no respiratory distress  [Soft] : abdomen soft [Non Tender] : non-tender [No Masses/organomegaly] : no masses/organomegaly [Normal Bowel Sounds] : normal bowel sounds [Normal Gait] : normal gait [No Edema] : no edema [No Cyanosis] : no cyanosis [No Clubbing] : no clubbing [No Varicosities] : no varicosities [No Rash] : no rash [No Skin Lesions] : no skin lesions [Moves all extremities] : moves all extremities [No Focal Deficits] : no focal deficits [Normal Speech] : normal speech [Alert and Oriented] : alert and oriented [Normal memory] : normal memory [S3] : no S3 [S4] : no S4 [Right Carotid Bruit] : no bruit heard over the right carotid [Left Carotid Bruit] : no bruit heard over the left carotid [Right Femoral Bruit] : no bruit heard over the right femoral artery [Left Femoral Bruit] : no bruit heard over the left femoral artery

## 2023-01-27 NOTE — REASON FOR VISIT
[CV Risk Factors and Non-Cardiac Disease] : CV risk factors and non-cardiac disease [FreeTextEntry1] : Patient is an 86 y/o male with pmhx of pulmonary embolism (10/2022), atrial fibrillation, status- post Covid infection in October 2022 presenting for follow up cardiac evaluation. Patient is doing well and has no complaints at this time. Patient reports taking his medication regularly as prescribed and reports no issues with the decreased dosage of metoprolol 12.5mg. He is under the care of his pulmonologist as well.\par  He denies chest pain, heart palpitations, SOB.\par Cardiac risk factors include hyperlipidemia (the patient was on Zocor 20 mg every other day).\par

## 2023-01-27 NOTE — DISCUSSION/SUMMARY
[FreeTextEntry1] : This is an 87-year-old male with past medical history significant for pulmonary embolus, atrial fibrillation,  status post COVID-19 infection October 2022, who comes in for cardiac follow-up evaluation.\par He denies chest pain, shortness of breath, dizziness or syncope.\par He has no history of rheumatic fever.  He does not drink excessive caffeine or alcohol.\par Cardiac risk factors include hyperlipidemia.\par The patient is taking Zocor 20 mg every other day with persistent LDL beyond target.\par Have asked him to discontinue his Zocor and start on Crestor 10 mg daily.\par ER follow-up blood work in 6 weeks.  He will wait 48 hours before starting Crestor therapy after stopping Zocor.\par Echo Doppler examination done January 12, 2023 demonstrated calcified mitral of annulus and mild mitral valve regurgitation, mild tricuspid valve regurgitation, minimal pulmonic valve regurgitation, satisfactory left ventricular function with estimated ejection fraction of 56% in range of 55 to 60%.  There was mild left atrial enlargement.\par The patient has been doing well in physical therapy.,  He will continue on Eliquis therapy.  He is instructed follow-up with his pulmonologist and primary care physician.\par He is currently hemodynamically stable and asymptomatic from a cardiac standpoint.\par PMH:\par The patient was admitted to [facility] on October 6, 2022 with complaints of stabbing chest pain not associated with any particular activity and fatigue that began a few days prior to hospital admission.\par He started out at urgent care where he was diagnosed with possible pneumonia, followed by evaluation by his primary care physician who sent him to the emergency room for further evaluation and treatment..\par  Patient states he was diagnosed with pulmonary embolism in the hospital, and his EKG from the hospital on 10/7/22 demonstrated atrial fibrillation with PVCs, and the EKG on 10/08/22 shows Atrial flutter.\par D- dimer assay on 10/06/22 was 3527 ng/mL.  None\par CT Angio of the Chest on 10/07/22 demonstrates few bilateral sub- pleural based nodules, largest measuring up to 5 mm in left lower lobe , and 3 mm subpleural based nodule in right middle lobe.\par The patient was discharged 6 days after on December 12th.  He was discharged on Eliquis 5 mg twice a day, Toprol tartrate 25 mg twice a day\par \par After hospital discharge, the patient's wife states that patient was not feeling well and they returned to the hospital where he was diagnosed with Covid 19 infection. They believe he must've gotten infected during his hospital stay.\par \par Echo Doppler examination done October 7 during hospitalization was limited and revealed physiologic mitral valve regurgitation, moderate tricuspid valve regurgitation, normal left ventricular function with estimated ejection fraction 65%.\par Electrocardiogram done December 2, 2022 demonstrated sinus bradycardia rate of 55 bpm is otherwise remarkable for 1 atrial premature contraction.\par The patient was also complaining of fatigue.\par \par He will lower his metoprolol to tartrate to 12.5 mg twice per day (taken his 1/2 tablet twice per day).\par \par Patient understands he must follow-up with his pulmonologist regarding his pulmonary emboli, he had an episode of atrial fibrillation which according to his wife is the first episode of such arrhythmia.  This episode of atrial fibrillation may have been secondary to a pulmonary emboli.\par The patient developed COVID-19 infection after hospital discharge.\par  I have also recommended a follow-up with the electrophysiology team.\par He is instructed to maintain good hydration.\par \par The patient understands that aerobic exercises must be increased to 40 minutes 4 times per week. A detailed discussion of lifestyle modification was done today. The patient has a good understanding of the diagnosis, and treatment plan. Lifestyle modification was also outlined.

## 2023-04-06 ENCOUNTER — LABORATORY RESULT (OUTPATIENT)
Age: 88
End: 2023-04-06

## 2023-04-19 ENCOUNTER — NON-APPOINTMENT (OUTPATIENT)
Age: 88
End: 2023-04-19

## 2023-04-19 ENCOUNTER — APPOINTMENT (OUTPATIENT)
Dept: CARDIOLOGY | Facility: CLINIC | Age: 88
End: 2023-04-19
Payer: MEDICARE

## 2023-04-19 VITALS
SYSTOLIC BLOOD PRESSURE: 125 MMHG | RESPIRATION RATE: 16 BRPM | TEMPERATURE: 98.1 F | HEIGHT: 72 IN | BODY MASS INDEX: 27.77 KG/M2 | HEART RATE: 53 BPM | WEIGHT: 205 LBS | DIASTOLIC BLOOD PRESSURE: 79 MMHG | OXYGEN SATURATION: 97 %

## 2023-04-19 PROCEDURE — 99214 OFFICE O/P EST MOD 30 MIN: CPT

## 2023-04-19 PROCEDURE — 93000 ELECTROCARDIOGRAM COMPLETE: CPT

## 2023-04-19 NOTE — DISCUSSION/SUMMARY
[FreeTextEntry1] : This is an 88-year-old male with past medical history significant for pulmonary embolus, atrial fibrillation,  status post COVID-19 infection October 2022, who comes in for cardiac follow-up evaluation.\par He denies chest pain, shortness of breath, dizziness or syncope.\par He has no history of rheumatic fever.  He does not drink excessive caffeine or alcohol.\par Cardiac risk factors include hyperlipidemia.\par Electrocardiogram done April 19, 2023 demonstrated sinus bradycardia rate of 53 bpm is otherwise unremarkable.\par The patient is currently on Eliquis 5 mg twice a day, metoprolol to tartrate 12.5 mg twice a day and Crestor 10 mg/day.  He is compliant with his medications.\par Lipid profile done April 6, 2023 demonstrate cholesterol 126, triglycerides 74, HDL 41, LDL calculated 70, non-HDL cholesterol 85 mg/dL with potassium of 4.3.  LDL direct 72 mg/dL and hemoglobin A1c of 5.6.\par The patient is taking Zocor 20 mg every other day with persistent LDL beyond target.\par Have asked him to discontinue his Zocor and start on Crestor 10 mg daily.\par ER follow-up blood work in 6 weeks.  He will wait 48 hours before starting Crestor therapy after stopping Zocor.\par Echo Doppler examination done January 12, 2023 demonstrated calcified mitral of annulus and mild mitral valve regurgitation, mild tricuspid valve regurgitation, minimal pulmonic valve regurgitation, satisfactory left ventricular function with estimated ejection fraction of 56% in range of 55 to 60%.  There was mild left atrial enlargement.\par The patient has been doing well in physical therapy.,  He will continue on Eliquis therapy.  He is instructed follow-up with his pulmonologist and primary care physician.\par He is currently hemodynamically stable and asymptomatic from a cardiac standpoint.\par PMH:\par The patient was admitted to [facility] on October 6, 2022 with complaints of stabbing chest pain not associated with any particular activity and fatigue that began a few days prior to hospital admission.\par He started out at urgent care where he was diagnosed with possible pneumonia, followed by evaluation by his primary care physician who sent him to the emergency room for further evaluation and treatment..\par  Patient states he was diagnosed with pulmonary embolism in the hospital, and his EKG from the hospital on 10/7/22 demonstrated atrial fibrillation with PVCs, and the EKG on 10/08/22 shows Atrial flutter.\par D- dimer assay on 10/06/22 was 3527 ng/mL.  None\par CT Angio of the Chest on 10/07/22 demonstrates few bilateral sub- pleural based nodules, largest measuring up to 5 mm in left lower lobe , and 3 mm subpleural based nodule in right middle lobe.\par The patient was discharged 6 days after on December 12th.  He was discharged on Eliquis 5 mg twice a day, Toprol tartrate 25 mg twice a day\par \par After hospital discharge, the patient's wife states that patient was not feeling well and they returned to the hospital where he was diagnosed with Covid 19 infection. They believe he must've gotten infected during his hospital stay.\par \par Echo Doppler examination done October 7 during hospitalization was limited and revealed physiologic mitral valve regurgitation, moderate tricuspid valve regurgitation, normal left ventricular function with estimated ejection fraction 65%.\par Electrocardiogram done December 2, 2022 demonstrated sinus bradycardia rate of 55 bpm is otherwise remarkable for 1 atrial premature contraction.\par The patient was also complaining of fatigue.\par \par He will lower his metoprolol to tartrate to 12.5 mg twice per day (taken his 1/2 tablet twice per day).\par \par Patient understands he must follow-up with his pulmonologist regarding his pulmonary emboli, he had an episode of atrial fibrillation which according to his wife is the first episode of such arrhythmia.  This episode of atrial fibrillation may have been secondary to a pulmonary emboli.\par The patient developed COVID-19 infection after hospital discharge.\par  I have also recommended a follow-up with the electrophysiology team.\par He is instructed to maintain good hydration.\par \par The patient understands that aerobic exercises must be increased to 40 minutes 4 times per week. A detailed discussion of lifestyle modification was done today. The patient has a good understanding of the diagnosis, and treatment plan. Lifestyle modification was also outlined.

## 2023-04-19 NOTE — PHYSICAL EXAM
[Well Developed] : well developed [Well Nourished] : well nourished [No Acute Distress] : no acute distress [Normal Conjunctiva] : normal conjunctiva [Normal Venous Pressure] : normal venous pressure [No Carotid Bruit] : no carotid bruit [Normal S1, S2] : normal S1, S2 [No Rub] : no rub [5th Left ICS - MCL] : palpated at the 5th LICS in the midclavicular line [Normal] : normal [No Precordial Heave] : no precordial heave was noted [Normal Rate] : normal [Rhythm Regular] : regular [Normal S1] : normal S1 [Normal S2] : normal S2 [No Gallop] : no gallop heard [II] : a grade 2 [I] : a grade 1 [No Pitting Edema] : no pitting edema present [2+] : left 2+ [No Abnormalities] : the abdominal aorta was not enlarged and no bruit was heard [Good Air Entry] : good air entry [Clear Lung Fields] : clear lung fields [No Respiratory Distress] : no respiratory distress  [Soft] : abdomen soft [Non Tender] : non-tender [No Masses/organomegaly] : no masses/organomegaly [Normal Bowel Sounds] : normal bowel sounds [Normal Gait] : normal gait [No Edema] : no edema [No Cyanosis] : no cyanosis [No Clubbing] : no clubbing [No Varicosities] : no varicosities [No Rash] : no rash [No Skin Lesions] : no skin lesions [Moves all extremities] : moves all extremities [No Focal Deficits] : no focal deficits [Normal Speech] : normal speech [Alert and Oriented] : alert and oriented [Normal memory] : normal memory [S3] : no S3 [S4] : no S4 [Right Carotid Bruit] : no bruit heard over the right carotid [Left Carotid Bruit] : no bruit heard over the left carotid [Right Femoral Bruit] : no bruit heard over the right femoral artery [Left Femoral Bruit] : no bruit heard over the left femoral artery

## 2023-04-19 NOTE — REASON FOR VISIT
[CV Risk Factors and Non-Cardiac Disease] : CV risk factors and non-cardiac disease [FreeTextEntry1] : This is an 88 year old male with past medical history of pulmonary embolism (10/2022), atrial fibrillation, status- post Covid infection in October 2022 presenting for follow up cardiac evaluation. Patient is doing well and has no complaints at this time. He denies chest pain, shortness of breath, dizziness or syncope.\par Patient reports taking his medication regularly as prescribed.\par  \par Cardiac risk factors include hyperlipidemia \par \par Echo Doppler examination done January 12, 2023 demonstrated calcified mitral of annulus and mild mitral valve regurgitation, mild tricuspid valve regurgitation, minimal pulmonic valve regurgitation, satisfactory left ventricular function with estimated ejection fraction of 56% in range of 55 to 60%.  There was mild left atrial enlargement.

## 2023-06-21 NOTE — PATIENT PROFILE ADULT - FUNCTIONAL ASSESSMENT - DAILY ACTIVITY SECTION LABEL
Brief Operative Note    Patient: Dillan Yee    MRN: [id number]    Surgeon(s): Surgeon(s) and Role:     * Ariel Hodge MD - Primary    Assistant(s): Jose Rogers MD    Pre-Op Diagnosis: ESRD     Post-Op Diagnosis: ESRD     Procedure: Procedure(s):  LEFT ARM BRACHIOBASILIC ARTERIOVENOUS FISTULA SECOND STAGE    Anesthesia Type: General                                   Complications: None    Findings: See op report    Specimens Removed: No specimens collected during this procedure.     Estimated Blood Loss: 15 cc    Assistant Tasks: Opening and closing     Implants:   Implant Name Type Inv. Item Serial No.  Lot No. LRB No. Used Action   CLIP INTERNAL SM CHEVRON 24 CRTDG LIGATE TRIANGULATE XSECT - [id number] Other Implant CLIP INTERNAL SM CHEVRON 24 CRTDG LIGATE TRIANGULATE XSECT  [employer] [id number] Left 1 Implanted   CLIP INTERNAL MED CHEVRON 24 CRTDG LIGATE TRIANGULATE XSECT - [id number] Other Implant CLIP INTERNAL MED CHEVRON 24 CRTDG LIGATE TRIANGULATE XSECT  [employer] [id number] Left 1 Implanted       
.

## 2023-08-26 NOTE — H&P ADULT - NSHPSOCIALHISTORY_GEN_ALL_CORE
Difficulty in walking and Generalized weakness substance use: No  Tobacco: Denies  EtOH: Denies   Recreational drug use: Denies   Lives with: wife  Ambulates: with assistance, walker

## 2023-09-28 NOTE — PATIENT PROFILE ADULT - INFORMATION COULD NOT BE OBTAINED DETAILS
"Subjective   Patient ID: December Danny is a 42 y.o. female who presents for dizzy spells.    HPI     # Dizzy Spells  - Tried new medication - buspirone - and experienced dizziness like a \"drunken spell\" for which she went to ER  - Was observed then released, but dizzy spells continued, though not as bad as the one in the ER  - No longer taking Buspirone but still gets dizzy spells  - Feels it when she gets up too fast  - Feels like room is spinning  - Reports 1 fall but was able to get up on her own without any injury    #Thyroid Instability  - S/p thyroidectomy for thyroid malignancy  - Reports taking Synthroid but ran out for 1 month as she was unable to get refills. Reports she just recently restarted Synthroid   - Reports hair has been falling out over past few months  - Gained weight but has lost more over recent months  - Constipated  - Has feelings of heat and cold intolerance that fluctuates    # Left Shoulder Pain  - Pain with range of motion for months  - Voltaren gel is not working  - Wants to try lidocaine patches    Review of Systems   Constitutional:  Positive for chills, fatigue and unexpected weight change. Negative for diaphoresis and fever.   HENT:  Negative for ear discharge, ear pain, hearing loss, rhinorrhea, sinus pressure, sinus pain and tinnitus.    Eyes:  Positive for pain, discharge, itching and visual disturbance.   Respiratory:  Positive for chest tightness. Negative for cough, shortness of breath and wheezing.    Cardiovascular:  Negative for chest pain, palpitations and leg swelling.   Gastrointestinal:  Positive for constipation, nausea and vomiting. Negative for abdominal distention, abdominal pain and diarrhea.   Endocrine: Positive for cold intolerance and heat intolerance.   Genitourinary:  Negative for difficulty urinating, dysuria and hematuria.   Musculoskeletal:  Positive for myalgias. Negative for neck pain and neck stiffness.   Skin:  Negative for pallor, rash and wound. " "  Neurological:  Positive for dizziness, light-headedness and headaches. Negative for weakness and numbness.   Psychiatric/Behavioral:  Positive for agitation. Negative for self-injury and suicidal ideas. The patient is nervous/anxious.            Objective   /86 (BP Location: Right arm, Patient Position: Sitting)   Pulse 86   Temp 37 °C (98.6 °F) (Temporal)   Ht 1.74 m (5' 8.5\")   Wt 80.7 kg (178 lb)   SpO2 98%   BMI 26.67 kg/m²     Physical Exam    General: NAD.  HEENT: Normocephalic, atraumatic.  Throat: No cervical adenopathy.  CV: RRR. Normal S1 and S2. No murmurs, rubs, gallops.  Pulm: Clear.  Abd: Nontender, nondistended.  Extremities: No edema.  Neurological: Grossly intact.  Skin: No rashes, lesions. No nail pitting or chipping.    Assessment/Plan     December Danny is a 42 year old female who presents to the clinic for dizziness and thyroid instability.    # Dizziness of Unknown Etiology  - CT head 9/22/23 negative for any findings, but not specific for inner ear pathology  - Sx also consistent with orthostatic hypotension but could also be caused by vertigo  - Trial of Meclizine PRN for dizziness and RTC if worsens  - Consider polypharmacy if dizziness does not improve    # Thyroid Instability  - Reports both hyperthyroidism (weight loss, brittle hair, heat intolerance) and also hypothyroidism (cold intolerance, constipation)  - Possible thyroid lability from compensation when off medication for past few weeks  - 09/23 TSH WNL but T4 may be elevated or depressed  - C/w synthroid 150 mcg QD  - Ordered TSH and T4    # Left Shoulder pain  - Ordered Lidocaine patch    Follow up in 8 weeks.    The patient was seen and discussed with Zohaib Solomon.    Familia Almeida, MS3    The history and physical were conducted with a medical student and I agree with the assessment and plan above.    Harlan Sher MD  Family Medicine  PGY-2       " same

## 2023-10-03 ENCOUNTER — LABORATORY RESULT (OUTPATIENT)
Age: 88
End: 2023-10-03

## 2023-10-09 ENCOUNTER — RESULT CHARGE (OUTPATIENT)
Age: 88
End: 2023-10-09

## 2023-10-10 ENCOUNTER — APPOINTMENT (OUTPATIENT)
Dept: CARDIOLOGY | Facility: CLINIC | Age: 88
End: 2023-10-10
Payer: MEDICARE

## 2023-10-10 ENCOUNTER — NON-APPOINTMENT (OUTPATIENT)
Age: 88
End: 2023-10-10

## 2023-10-10 VITALS
DIASTOLIC BLOOD PRESSURE: 83 MMHG | RESPIRATION RATE: 16 BRPM | OXYGEN SATURATION: 98 % | TEMPERATURE: 97.3 F | WEIGHT: 214 LBS | SYSTOLIC BLOOD PRESSURE: 132 MMHG | BODY MASS INDEX: 28.99 KG/M2 | HEART RATE: 64 BPM | HEIGHT: 72 IN

## 2023-10-10 DIAGNOSIS — R00.1 BRADYCARDIA, UNSPECIFIED: ICD-10-CM

## 2023-10-10 DIAGNOSIS — R53.83 OTHER FATIGUE: ICD-10-CM

## 2023-10-10 DIAGNOSIS — I27.20 PULMONARY HYPERTENSION, UNSPECIFIED: ICD-10-CM

## 2023-10-10 DIAGNOSIS — I48.92 UNSPECIFIED ATRIAL FLUTTER: ICD-10-CM

## 2023-10-10 PROCEDURE — 93000 ELECTROCARDIOGRAM COMPLETE: CPT

## 2023-10-10 PROCEDURE — 99214 OFFICE O/P EST MOD 30 MIN: CPT

## 2023-11-01 RX ORDER — APIXABAN 5 MG/1
5 TABLET, FILM COATED ORAL
Qty: 180 | Refills: 1 | Status: ACTIVE | COMMUNITY
Start: 2022-12-12

## 2023-11-30 RX ORDER — APIXABAN 2.5 MG/1
1 TABLET, FILM COATED ORAL
Qty: 0 | Refills: 0 | DISCHARGE

## 2023-11-30 RX ORDER — SIMVASTATIN 20 MG/1
1 TABLET, FILM COATED ORAL
Qty: 0 | Refills: 0 | DISCHARGE

## 2023-11-30 RX ORDER — FINASTERIDE 5 MG/1
1 TABLET, FILM COATED ORAL
Qty: 0 | Refills: 0 | DISCHARGE

## 2023-11-30 RX ORDER — RIVASTIGMINE 4.6 MG/24H
1 PATCH, EXTENDED RELEASE TRANSDERMAL
Qty: 0 | Refills: 0 | DISCHARGE

## 2024-01-02 ENCOUNTER — RX RENEWAL (OUTPATIENT)
Age: 89
End: 2024-01-02

## 2024-01-03 RX ORDER — METOPROLOL TARTRATE 25 MG/1
25 TABLET, FILM COATED ORAL
Qty: 90 | Refills: 1 | Status: ACTIVE | COMMUNITY
Start: 2022-12-12 | End: 1900-01-01

## 2024-02-18 NOTE — DISCHARGE NOTE NURSING/CASE MANAGEMENT/SOCIAL WORK - NSDCPEELIQUISREACT_GEN_ALL_CORE
Tone is normal, moving all extremities well, reflexes normal for age. Apixaban/Eliquis increases your risk for bleeding. Notify your doctor if you experience any of the following side effects: bleeding, coughing or vomiting blood, red or black stool, unexpected pain or swelling, itching or hives, chest pain, chest tightness, trouble breathing, changes in how much or how often you urinate, red or pink urine, numbness or tingling in your feet, or unusual muscle weakness. When Apixaban/Eliquis is taken with other medicines, they can affect how it works. Taking other medications such as aspirin, blood thinners, nonsteroidal anti-inflammatories, and medications that treat depression can increase your risk of bleeding. It is very important to tell your health care provider about all of the other medicines, including over-the-counter medications, herbs, and vitamins you are taking. DO NOT start, stop, or change the dosage of any medicine, including over-the-counter medicines, vitamins, and herbal products without your doctor’s approval. Any products containing aspirin or are nonsteroidal anti-inflammatories lessen the blood’s ability to form clots and add to the effect of Apixaban/Eliquis. Never take aspirin or medicines that contain aspirin without speaking to your doctor.

## 2024-03-20 LAB
ALBUMIN SERPL ELPH-MCNC: 4.8 G/DL
ALP BLD-CCNC: 58 U/L
ALT SERPL-CCNC: 32 U/L
ANION GAP SERPL CALC-SCNC: 12 MMOL/L
AST SERPL-CCNC: 30 U/L
BILIRUB DIRECT SERPL-MCNC: 0.2 MG/DL
BILIRUB INDIRECT SERPL-MCNC: 0.5 MG/DL
BILIRUB SERPL-MCNC: 0.6 MG/DL
BUN SERPL-MCNC: 15 MG/DL
CALCIUM SERPL-MCNC: 9.5 MG/DL
CHLORIDE SERPL-SCNC: 104 MMOL/L
CHOLEST SERPL-MCNC: 141 MG/DL
CO2 SERPL-SCNC: 26 MMOL/L
CREAT SERPL-MCNC: 0.94 MG/DL
EGFR: 77 ML/MIN/1.73M2
ESTIMATED AVERAGE GLUCOSE: 111 MG/DL
GLUCOSE SERPL-MCNC: 97 MG/DL
HBA1C MFR BLD HPLC: 5.5 %
HDLC SERPL-MCNC: 35 MG/DL
LDLC SERPL CALC-MCNC: 79 MG/DL
LDLC SERPL DIRECT ASSAY-MCNC: 77 MG/DL
NONHDLC SERPL-MCNC: 106 MG/DL
POTASSIUM SERPL-SCNC: 4.3 MMOL/L
PROT SERPL-MCNC: 7.2 G/DL
SODIUM SERPL-SCNC: 143 MMOL/L
TRIGL SERPL-MCNC: 153 MG/DL
TSH SERPL-ACNC: 3.58 UIU/ML
URATE SERPL-MCNC: 5.8 MG/DL

## 2024-03-26 ENCOUNTER — APPOINTMENT (OUTPATIENT)
Dept: CARDIOLOGY | Facility: CLINIC | Age: 89
End: 2024-03-26
Payer: MEDICARE

## 2024-03-26 ENCOUNTER — NON-APPOINTMENT (OUTPATIENT)
Age: 89
End: 2024-03-26

## 2024-03-26 VITALS
OXYGEN SATURATION: 98 % | SYSTOLIC BLOOD PRESSURE: 128 MMHG | DIASTOLIC BLOOD PRESSURE: 81 MMHG | RESPIRATION RATE: 16 BRPM | WEIGHT: 210 LBS | BODY MASS INDEX: 28.44 KG/M2 | HEIGHT: 72 IN | HEART RATE: 81 BPM | TEMPERATURE: 98.1 F

## 2024-03-26 VITALS — HEART RATE: 60 BPM

## 2024-03-26 DIAGNOSIS — E78.5 HYPERLIPIDEMIA, UNSPECIFIED: ICD-10-CM

## 2024-03-26 DIAGNOSIS — R01.1 CARDIAC MURMUR, UNSPECIFIED: ICD-10-CM

## 2024-03-26 DIAGNOSIS — I48.91 UNSPECIFIED ATRIAL FIBRILLATION: ICD-10-CM

## 2024-03-26 PROBLEM — F03.90 UNSPECIFIED DEMENTIA WITHOUT BEHAVIORAL DISTURBANCE: Chronic | Status: ACTIVE | Noted: 2022-10-07

## 2024-03-26 PROBLEM — I26.99 OTHER PULMONARY EMBOLISM WITHOUT ACUTE COR PULMONALE: Chronic | Status: ACTIVE | Noted: 2022-10-14

## 2024-03-26 PROBLEM — N40.0 BENIGN PROSTATIC HYPERPLASIA WITHOUT LOWER URINARY TRACT SYMPTOMS: Chronic | Status: ACTIVE | Noted: 2022-10-06

## 2024-03-26 PROCEDURE — G2211 COMPLEX E/M VISIT ADD ON: CPT

## 2024-03-26 PROCEDURE — 93000 ELECTROCARDIOGRAM COMPLETE: CPT

## 2024-03-26 PROCEDURE — 99214 OFFICE O/P EST MOD 30 MIN: CPT

## 2024-03-26 RX ORDER — ROSUVASTATIN CALCIUM 20 MG/1
20 TABLET, FILM COATED ORAL
Qty: 90 | Refills: 1 | Status: ACTIVE | COMMUNITY
Start: 2023-01-27 | End: 1900-01-01

## 2024-03-26 NOTE — PHYSICAL EXAM
[Well Developed] : well developed [Well Nourished] : well nourished [No Acute Distress] : no acute distress [Normal Conjunctiva] : normal conjunctiva [Normal Venous Pressure] : normal venous pressure [No Carotid Bruit] : no carotid bruit [Normal S1, S2] : normal S1, S2 [No Rub] : no rub [5th Left ICS - MCL] : palpated at the 5th LICS in the midclavicular line [Normal] : normal [No Precordial Heave] : no precordial heave was noted [Normal Rate] : normal [Rhythm Regular] : regular [Normal S1] : normal S1 [Normal S2] : normal S2 [S3] : no S3 [No Gallop] : no gallop heard [S4] : no S4 [II] : a grade 2 [I] : a grade 1 [No Pitting Edema] : no pitting edema present [Left Carotid Bruit] : no bruit heard over the left carotid [Right Carotid Bruit] : no bruit heard over the right carotid [Right Femoral Bruit] : no bruit heard over the right femoral artery [Left Femoral Bruit] : no bruit heard over the left femoral artery [2+] : left 2+ [No Abnormalities] : the abdominal aorta was not enlarged and no bruit was heard [Clear Lung Fields] : clear lung fields [No Respiratory Distress] : no respiratory distress  [Good Air Entry] : good air entry [Soft] : abdomen soft [Non Tender] : non-tender [No Masses/organomegaly] : no masses/organomegaly [Normal Bowel Sounds] : normal bowel sounds [Normal Gait] : normal gait [No Cyanosis] : no cyanosis [No Edema] : no edema [No Clubbing] : no clubbing [No Rash] : no rash [No Varicosities] : no varicosities [No Skin Lesions] : no skin lesions [Moves all extremities] : moves all extremities [No Focal Deficits] : no focal deficits [Normal Speech] : normal speech [Alert and Oriented] : alert and oriented [Normal memory] : normal memory

## 2024-03-26 NOTE — DISCUSSION/SUMMARY
[FreeTextEntry1] : This is an 89-year-old male with past medical history significant for pulmonary embolus, atrial fibrillation,  status post COVID-19 infection October 2022, who comes in for cardiac follow-up evaluation. He denies chest pain, shortness of breath, dizziness and syncope. Electrocardiogram done March 26, 2024 demonstrates sinus bradycardia rate of 60 bpm is otherwise unremarkable. Lipid panel done March 19, 2024 demonstrated hemoglobin A1c of 5.5, triglycerides 153, cholesterol 141, HDL 35, LDL calculated 79, non-HDL cholesterol 160 mg/dL.  LDL direct 77 mg/dL. The patient will increase his Crestor to 20 mg daily to achieve an LDL target of less than 70 mg/dL.  He will have repeat blood work done in 6 to 8 weeks. He is instructed to maintain good hydration.  He will follow-up with his primary care physician.  He will follow-up with me in 3 months.  Cardiac risk factors include hyperlipidemia. Electrocardiogram done October 10, 2023 demonstrated normal sinus rhythm rate 64 bpm is otherwise unremarkable. I have asked the patient to lower his metoprolol to tartrate to 12.5 mg (one half of the 25 mg tablet) after dinner to see if he has less fatigue. Lipid panel done October 3, 2023 demonstrated cholesterol 129, triglycerides 106, HDL 38, LDL calculated 71, non-HDL cholesterol 91 mg/dL.  Direct LDL 70 mg/dL. Echo Doppler examination done January 12, 2023 demonstrated mild aortic valve regurgitation, mild mitral valve regurgitation, mild tricuspid valve regurgitation, minimal pulmonic valve regurgitation, mild left atrial enlargement with estimated ejection fraction of 55 to 60% and a visual estimation of 56%. Electrocardiogram done April 19, 2023 demonstrated sinus bradycardia rate of 53 bpm is otherwise unremarkable. The patient is currently on Eliquis 5 mg twice a day, metoprolol to tartrate 12.5 mg twice a day and Crestor. Lipid profile done April 6, 2023 demonstrate cholesterol 126, triglycerides 74, HDL 41, LDL calculated 70, non-HDL cholesterol 85 mg/dL with potassium of 4.3.  LDL direct 72 mg/dL and hemoglobin A1c of 5.6.  Echo Doppler examination done January 12, 2023 demonstrated calcified mitral of annulus and mild mitral valve regurgitation, mild tricuspid valve regurgitation, minimal pulmonic valve regurgitation, satisfactory left ventricular function with estimated ejection fraction of 56% in range of 55 to 60%.  There was mild left atrial enlargement. He is currently hemodynamically stable and asymptomatic from a cardiac standpoint. PMH: The patient was admitted to [facility] on October 6, 2022 with complaints of stabbing chest pain not associated with any particular activity and fatigue that began a few days prior to hospital admission. He started out at urgent care where he was diagnosed with possible pneumonia, followed by evaluation by his primary care physician who sent him to the emergency room for further evaluation and treatment..  Patient states he was diagnosed with pulmonary embolism in the hospital, and his EKG from the hospital on 10/7/22 demonstrated atrial fibrillation with PVCs, and the EKG on 10/08/22 shows Atrial flutter. D- dimer assay on 10/06/22 was 3527 ng/mL.  None CT Angio of the Chest on 10/07/22 demonstrates few bilateral sub- pleural based nodules, largest measuring up to 5 mm in left lower lobe , and 3 mm subpleural based nodule in right middle lobe. The patient was discharged 6 days after on December 12th.  He was discharged on Eliquis 5 mg twice a day, Toprol tartrate 25 mg twice a day  After hospital discharge, the patient's wife states that patient was not feeling well and they returned to the hospital where he was diagnosed with Covid 19 infection. They believe he must've gotten infected during his hospital stay.  Echo Doppler examination done October 7 during hospitalization was limited and revealed physiologic mitral valve regurgitation, moderate tricuspid valve regurgitation, normal left ventricular function with estimated ejection fraction 65%. Electrocardiogram done December 2, 2022 demonstrated sinus bradycardia rate of 55 bpm is otherwise remarkable for 1 atrial premature contraction. The patient was also complaining of fatigue.  Patient understands he must follow-up with his pulmonologist regarding his pulmonary emboli, he had an episode of atrial fibrillation which according to his wife is the first episode of such arrhythmia.  This episode of atrial fibrillation may have been secondary to a pulmonary emboli. The patient developed COVID-19 infection after hospital discharge.  I have also recommended a follow-up with the electrophysiology team.  The patient understands that aerobic exercises must be increased to 40 minutes 4 times per week. A detailed discussion of lifestyle modification was done today. The patient has a good understanding of the diagnosis, and treatment plan. Lifestyle modification was also outlined.

## 2024-03-26 NOTE — REASON FOR VISIT
[CV Risk Factors and Non-Cardiac Disease] : CV risk factors and non-cardiac disease [FreeTextEntry3] : Dr. David Bronson [FreeTextEntry1] : This is an 89 year old male with past medical history of pulmonary embolism (10/2022), atrial fibrillation, status- post Covid infection in October 2022 presenting for follow up cardiac evaluation.  Cardiac risk factors include hyperlipidemia   HPI: Patient is doing well and has no complaints at this time. He denies chest pain, shortness of breath, dizziness or syncope. Complaining of increasing fatigue recently with midmorning naps, would suggest skipping his AM dose of Metoprolol 12.5 mg to see if this alleviates.   Currently taking Eliquis 5 mg BID, Metoprolol Tartrate 12.5 mg BID, and Rosuvastatin 10 mg daily   BLOOD WORK: -Last done 10/9/2023 with triglycerides 106, cholesterol 129, HDL 38, LDL 71, Non-HDL 91, LDL-direct 70 - Lipid profile done April 6, 2023 demonstrate cholesterol 126, triglycerides 74, HDL 41, LDL calculated 70, non-HDL cholesterol 85 mg/dL with potassium of 4.3.  LDL direct 72 mg/dL and hemoglobin A1c of 5.6.   TESTING/REPORTS: -EKG done 10/10/2023 which demonstrated sinus rhythm heart rate 64 BPM with right bundle branch block and occasional PVC.  -Electrocardiogram done April 19, 2023 demonstrated sinus bradycardia rate of 53 bpm is otherwise unremarkable. -Echo Doppler examination done January 12, 2023 demonstrated calcified mitral of annulus and mild mitral valve regurgitation, mild tricuspid valve regurgitation, minimal pulmonic valve regurgitation, satisfactory left ventricular function with estimated ejection fraction of 56% in range of 55 to 60%.  There was mild left atrial enlargement. -Electrocardiogram done December 2, 2022 demonstrated sinus bradycardia rate of 55 bpm is otherwise remarkable for 1 atrial premature contraction.  PLAN: -He will stop his AM dose of Metoprolol 12.5 mg to see if this helps alleviate his daytime fatigue  -He will continue his other current medications and contact the office if problems arise before his next follow-up appointment. -Follow-up 3 months.

## 2024-03-26 NOTE — REVIEW OF SYSTEMS
[Fever] : no fever [Headache] : no headache [Weight Gain (___ Lbs)] : no recent weight gain [Chills] : no chills [Feeling Fatigued] : feeling fatigued [Weight Loss (___ Lbs)] : no recent weight loss [Negative] : Heme/Lymph

## 2024-04-26 ENCOUNTER — EMERGENCY (EMERGENCY)
Facility: HOSPITAL | Age: 89
LOS: 1 days | Discharge: HOME | End: 2024-04-26
Attending: EMERGENCY MEDICINE | Admitting: EMERGENCY MEDICINE
Payer: MEDICARE

## 2024-04-26 VITALS
SYSTOLIC BLOOD PRESSURE: 147 MMHG | RESPIRATION RATE: 18 BRPM | DIASTOLIC BLOOD PRESSURE: 81 MMHG | TEMPERATURE: 99 F | OXYGEN SATURATION: 96 % | HEIGHT: 70 IN | HEART RATE: 93 BPM | WEIGHT: 179.9 LBS

## 2024-04-26 VITALS
OXYGEN SATURATION: 95 % | SYSTOLIC BLOOD PRESSURE: 130 MMHG | HEART RATE: 80 BPM | TEMPERATURE: 99 F | RESPIRATION RATE: 18 BRPM | DIASTOLIC BLOOD PRESSURE: 74 MMHG

## 2024-04-26 DIAGNOSIS — Z98.890 OTHER SPECIFIED POSTPROCEDURAL STATES: Chronic | ICD-10-CM

## 2024-04-26 LAB
ALBUMIN SERPL ELPH-MCNC: 3.8 G/DL — SIGNIFICANT CHANGE UP (ref 3.3–5)
ALP SERPL-CCNC: 64 U/L — SIGNIFICANT CHANGE UP (ref 40–120)
ALT FLD-CCNC: 37 U/L — SIGNIFICANT CHANGE UP (ref 12–78)
ANION GAP SERPL CALC-SCNC: 8 MMOL/L — SIGNIFICANT CHANGE UP (ref 5–17)
APTT BLD: 35.4 SEC — SIGNIFICANT CHANGE UP (ref 24.5–35.6)
AST SERPL-CCNC: 32 U/L — SIGNIFICANT CHANGE UP (ref 15–37)
BASOPHILS # BLD AUTO: 0.02 K/UL — SIGNIFICANT CHANGE UP (ref 0–0.2)
BASOPHILS NFR BLD AUTO: 0.3 % — SIGNIFICANT CHANGE UP (ref 0–2)
BILIRUB SERPL-MCNC: 1 MG/DL — SIGNIFICANT CHANGE UP (ref 0.2–1.2)
BUN SERPL-MCNC: 16 MG/DL — SIGNIFICANT CHANGE UP (ref 7–23)
CALCIUM SERPL-MCNC: 9.2 MG/DL — SIGNIFICANT CHANGE UP (ref 8.5–10.1)
CHLORIDE SERPL-SCNC: 106 MMOL/L — SIGNIFICANT CHANGE UP (ref 96–108)
CO2 SERPL-SCNC: 26 MMOL/L — SIGNIFICANT CHANGE UP (ref 22–31)
CREAT SERPL-MCNC: 1 MG/DL — SIGNIFICANT CHANGE UP (ref 0.5–1.3)
EGFR: 72 ML/MIN/1.73M2 — SIGNIFICANT CHANGE UP
EOSINOPHIL # BLD AUTO: 0 K/UL — SIGNIFICANT CHANGE UP (ref 0–0.5)
EOSINOPHIL NFR BLD AUTO: 0 % — SIGNIFICANT CHANGE UP (ref 0–6)
FLUAV AG NPH QL: SIGNIFICANT CHANGE UP
FLUBV AG NPH QL: SIGNIFICANT CHANGE UP
GLUCOSE SERPL-MCNC: 119 MG/DL — HIGH (ref 70–99)
HCT VFR BLD CALC: 35.7 % — LOW (ref 39–50)
HGB BLD-MCNC: 11.9 G/DL — LOW (ref 13–17)
IMM GRANULOCYTES NFR BLD AUTO: 0.6 % — SIGNIFICANT CHANGE UP (ref 0–0.9)
INR BLD: 1.83 RATIO — HIGH (ref 0.85–1.18)
LACTATE SERPL-SCNC: 1.2 MMOL/L — SIGNIFICANT CHANGE UP (ref 0.7–2)
LYMPHOCYTES # BLD AUTO: 1.08 K/UL — SIGNIFICANT CHANGE UP (ref 1–3.3)
LYMPHOCYTES # BLD AUTO: 15.3 % — SIGNIFICANT CHANGE UP (ref 13–44)
MCHC RBC-ENTMCNC: 29.8 PG — SIGNIFICANT CHANGE UP (ref 27–34)
MCHC RBC-ENTMCNC: 33.3 GM/DL — SIGNIFICANT CHANGE UP (ref 32–36)
MCV RBC AUTO: 89.5 FL — SIGNIFICANT CHANGE UP (ref 80–100)
MONOCYTES # BLD AUTO: 0.8 K/UL — SIGNIFICANT CHANGE UP (ref 0–0.9)
MONOCYTES NFR BLD AUTO: 11.3 % — SIGNIFICANT CHANGE UP (ref 2–14)
NEUTROPHILS # BLD AUTO: 5.12 K/UL — SIGNIFICANT CHANGE UP (ref 1.8–7.4)
NEUTROPHILS NFR BLD AUTO: 72.5 % — SIGNIFICANT CHANGE UP (ref 43–77)
NRBC # BLD: 0 /100 WBCS — SIGNIFICANT CHANGE UP (ref 0–0)
PLATELET # BLD AUTO: 153 K/UL — SIGNIFICANT CHANGE UP (ref 150–400)
POTASSIUM SERPL-MCNC: 4.1 MMOL/L — SIGNIFICANT CHANGE UP (ref 3.5–5.3)
POTASSIUM SERPL-SCNC: 4.1 MMOL/L — SIGNIFICANT CHANGE UP (ref 3.5–5.3)
PROT SERPL-MCNC: 7.9 G/DL — SIGNIFICANT CHANGE UP (ref 6–8.3)
PROTHROM AB SERPL-ACNC: 21 SEC — HIGH (ref 9.5–13)
RBC # BLD: 3.99 M/UL — LOW (ref 4.2–5.8)
RBC # FLD: 13.2 % — SIGNIFICANT CHANGE UP (ref 10.3–14.5)
RSV RNA NPH QL NAA+NON-PROBE: SIGNIFICANT CHANGE UP
SARS-COV-2 RNA SPEC QL NAA+PROBE: DETECTED
SODIUM SERPL-SCNC: 140 MMOL/L — SIGNIFICANT CHANGE UP (ref 135–145)
WBC # BLD: 7.06 K/UL — SIGNIFICANT CHANGE UP (ref 3.8–10.5)
WBC # FLD AUTO: 7.06 K/UL — SIGNIFICANT CHANGE UP (ref 3.8–10.5)

## 2024-04-26 PROCEDURE — 71045 X-RAY EXAM CHEST 1 VIEW: CPT

## 2024-04-26 PROCEDURE — 71045 X-RAY EXAM CHEST 1 VIEW: CPT | Mod: 26

## 2024-04-26 PROCEDURE — 83605 ASSAY OF LACTIC ACID: CPT

## 2024-04-26 PROCEDURE — 80053 COMPREHEN METABOLIC PANEL: CPT

## 2024-04-26 PROCEDURE — 99285 EMERGENCY DEPT VISIT HI MDM: CPT

## 2024-04-26 PROCEDURE — 87637 SARSCOV2&INF A&B&RSV AMP PRB: CPT

## 2024-04-26 PROCEDURE — 85610 PROTHROMBIN TIME: CPT

## 2024-04-26 PROCEDURE — 99284 EMERGENCY DEPT VISIT MOD MDM: CPT | Mod: 25

## 2024-04-26 PROCEDURE — 85730 THROMBOPLASTIN TIME PARTIAL: CPT

## 2024-04-26 PROCEDURE — 36415 COLL VENOUS BLD VENIPUNCTURE: CPT

## 2024-04-26 PROCEDURE — 85025 COMPLETE CBC W/AUTO DIFF WBC: CPT

## 2024-04-26 PROCEDURE — 87040 BLOOD CULTURE FOR BACTERIA: CPT

## 2024-04-26 RX ORDER — ACETAMINOPHEN 500 MG
650 TABLET ORAL ONCE
Refills: 0 | Status: COMPLETED | OUTPATIENT
Start: 2024-04-26 | End: 2024-04-26

## 2024-04-26 RX ORDER — SODIUM CHLORIDE 9 MG/ML
1000 INJECTION INTRAMUSCULAR; INTRAVENOUS; SUBCUTANEOUS ONCE
Refills: 0 | Status: COMPLETED | OUTPATIENT
Start: 2024-04-26 | End: 2024-04-26

## 2024-04-26 RX ADMIN — SODIUM CHLORIDE 1000 MILLILITER(S): 9 INJECTION INTRAMUSCULAR; INTRAVENOUS; SUBCUTANEOUS at 15:32

## 2024-04-26 RX ADMIN — Medication 650 MILLIGRAM(S): at 15:32

## 2024-04-26 NOTE — ED ADULT NURSE NOTE - PAIN RATING/NUMBER SCALE (0-10): ACTIVITY
0 (no pain/absence of nonverbal indicators of pain) Finasteride Pregnancy And Lactation Text: This medication is absolutely contraindicated during pregnancy. It is unknown if it is excreted in breast milk.

## 2024-04-26 NOTE — ED ADULT NURSE NOTE - OBJECTIVE STATEMENT
Pt received in 9 89y male AXO 1 c/o cough, chills and lethargy. PMH PE, Afib, dementia, HLD. Pts family member states Wednesday a few days ago pt went out to lunch with friends and one of his friends had covid, pt then began to develop a cough, chills and became increasingly lethargic. Today pt was unable to get out of bed and EMS was called, pt brought to [place] [facility]. Upon assessment pt is lethargic, feels warm to touch. Pt endorses chills upon assessment. Pt placed on cardiac monitor Showing NSR, Continuous pulse ox saturating at 95% and higher on 2 L NC, Right 20 AC placed, labs drawn, meds given as prescribed.

## 2024-04-26 NOTE — ED PROVIDER NOTE - PROGRESS NOTE DETAILS
pt reevaluated, feeling better, able to get up and walk, follow up with pmd, return if any symptoms worsen

## 2024-04-26 NOTE — ED ADULT NURSE NOTE - NS ED NURSE IV DC DT
PT COMPLAINED OF SEVERE PAIN. PRN PAIN MEDICATION ADMINISTERED AS PRESCRIBED PER MD ORDER. 
PT TOLERATED WELL. MEDICATION EDUCATION PERFORMED. PT VERBALIZED UNDERSTANDING. SAFETY 
MEASURES IN PLACE. WILL CONTINUE TO MONITOR 26-Apr-2024 18:47

## 2024-04-26 NOTE — ED PROVIDER NOTE - PATIENT PORTAL LINK FT
You can access the FollowMyHealth Patient Portal offered by [facility] by registering at the following website: [email or web address]. By joining [employer]’s FollowMyHealth portal, you will also be able to view your health information using other applications (apps) compatible with our system.

## 2024-04-26 NOTE — ED ADULT NURSE NOTE - ED CARDIAC CAPILLARY REFILL
2 seconds or less Cosentyx Counseling:  I discussed with the patient the risks of Cosentyx including but not limited to worsening of Crohn's disease, immunosuppression, allergic reactions and infections.  The patient understands that monitoring is required including a PPD at baseline and must alert us or the primary physician if symptoms of infection or other concerning signs are noted.

## 2024-04-26 NOTE — ED ADULT NURSE NOTE - NSFALLHARMRISKINTERV_ED_ALL_ED

## 2024-05-01 LAB
CULTURE RESULTS: SIGNIFICANT CHANGE UP
CULTURE RESULTS: SIGNIFICANT CHANGE UP
SPECIMEN SOURCE: SIGNIFICANT CHANGE UP
SPECIMEN SOURCE: SIGNIFICANT CHANGE UP

## 2024-05-20 ENCOUNTER — LABORATORY RESULT (OUTPATIENT)
Age: 89
End: 2024-05-20

## 2024-09-09 ENCOUNTER — RX RENEWAL (OUTPATIENT)
Age: 89
End: 2024-09-09

## 2024-09-25 ENCOUNTER — APPOINTMENT (OUTPATIENT)
Dept: CARDIOLOGY | Facility: CLINIC | Age: 89
End: 2024-09-25
Payer: MEDICARE

## 2024-09-25 ENCOUNTER — NON-APPOINTMENT (OUTPATIENT)
Age: 89
End: 2024-09-25

## 2024-09-25 VITALS
WEIGHT: 217 LBS | DIASTOLIC BLOOD PRESSURE: 78 MMHG | SYSTOLIC BLOOD PRESSURE: 125 MMHG | TEMPERATURE: 98 F | OXYGEN SATURATION: 99 % | HEIGHT: 72 IN | RESPIRATION RATE: 16 BRPM | BODY MASS INDEX: 29.39 KG/M2 | HEART RATE: 57 BPM

## 2024-09-25 DIAGNOSIS — I48.91 UNSPECIFIED ATRIAL FIBRILLATION: ICD-10-CM

## 2024-09-25 DIAGNOSIS — E78.5 HYPERLIPIDEMIA, UNSPECIFIED: ICD-10-CM

## 2024-09-25 DIAGNOSIS — R01.1 CARDIAC MURMUR, UNSPECIFIED: ICD-10-CM

## 2024-09-25 PROCEDURE — G2211 COMPLEX E/M VISIT ADD ON: CPT

## 2024-09-25 PROCEDURE — 93000 ELECTROCARDIOGRAM COMPLETE: CPT

## 2024-09-25 PROCEDURE — 99214 OFFICE O/P EST MOD 30 MIN: CPT

## 2024-09-25 NOTE — DISCUSSION/SUMMARY
[FreeTextEntry1] : This is an 89-year-old male with past medical history significant for pulmonary embolus, atrial fibrillation,  status post COVID-19 infection October 2022, who comes in for cardiac follow-up evaluation. He denies chest pain, shortness of breath, dizziness and syncope. He has less fatigue on the Toprol tartrate 12.5 mg after dinner. Electrocardiogram done September 25, 2024 demonstrates sinus bradycardia rate of 57 bpm is otherwise remarkable for first-degree atrioventricular block and 1 atrial premature contraction. The patient will continue on his usual medications. Lipid panel done May 28, 2024 demonstrated a cholesterol 118, HDL 38, triglycerides 106, LDL calculated 60, non-HDL cholesterol 80, LDL direct 62 mg/dL, with an lipoprotein being a 69 mg/dL. The patient is currently hemodynamically stable and asymptomatic from a cardiac standpoint. Electrocardiogram done March 26, 2024 demonstrates sinus bradycardia rate of 60 bpm is otherwise unremarkable. Lipid panel done March 19, 2024 demonstrated hemoglobin A1c of 5.5, triglycerides 153, cholesterol 141, HDL 35, LDL calculated 79, non-HDL cholesterol 160 mg/dL.  LDL direct 77 mg/dL. The patient will increase his Crestor to 20 mg daily to achieve an LDL target of less than 70 mg/dL.  He will have repeat blood work done in 6 to 8 weeks. He is instructed to maintain good hydration.  He will follow-up with his primary care physician.  Cardiac risk factors include hyperlipidemia. Electrocardiogram done October 10, 2023 demonstrated normal sinus rhythm rate 64 bpm is otherwise unremarkable. Lipid panel done October 3, 2023 demonstrated cholesterol 129, triglycerides 106, HDL 38, LDL calculated 71, non-HDL cholesterol 91 mg/dL.  Direct LDL 70 mg/dL. Echo Doppler examination done January 12, 2023 demonstrated mild aortic valve regurgitation, mild mitral valve regurgitation, mild tricuspid valve regurgitation, minimal pulmonic valve regurgitation, mild left atrial enlargement with estimated ejection fraction of 55 to 60% and a visual estimation of 56%. Electrocardiogram done April 19, 2023 demonstrated sinus bradycardia rate of 53 bpm is otherwise unremarkable. The patient is currently on Eliquis 5 mg twice a day, metoprolol to tartrate 12.5 mg twice a day and Crestor. Lipid profile done April 6, 2023 demonstrate cholesterol 126, triglycerides 74, HDL 41, LDL calculated 70, non-HDL cholesterol 85 mg/dL with potassium of 4.3.  LDL direct 72 mg/dL and hemoglobin A1c of 5.6.  Echo Doppler examination done January 12, 2023 demonstrated calcified mitral of annulus and mild mitral valve regurgitation, mild tricuspid valve regurgitation, minimal pulmonic valve regurgitation, satisfactory left ventricular function with estimated ejection fraction of 56% in range of 55 to 60%.  There was mild left atrial enlargement. He is currently hemodynamically stable and asymptomatic from a cardiac standpoint. PMH: The patient was admitted to [facility] on October 6, 2022 with complaints of stabbing chest pain not associated with any particular activity and fatigue that began a few days prior to hospital admission. He started out at urgent care where he was diagnosed with possible pneumonia, followed by evaluation by his primary care physician who sent him to the emergency room for further evaluation and treatment..  Patient states he was diagnosed with pulmonary embolism in the hospital, and his EKG from the hospital on 10/7/22 demonstrated atrial fibrillation with PVCs, and the EKG on 10/08/22 shows Atrial flutter. D- dimer assay on 10/06/22 was 3527 ng/mL.  None CT Angio of the Chest on 10/07/22 demonstrates few bilateral sub- pleural based nodules, largest measuring up to 5 mm in left lower lobe , and 3 mm subpleural based nodule in right middle lobe. The patient was discharged 6 days after on December 12th.  He was discharged on Eliquis 5 mg twice a day, Toprol tartrate 25 mg twice a day  After hospital discharge, the patient's wife states that patient was not feeling well and they returned to the hospital where he was diagnosed with Covid 19 infection. They believe he must've gotten infected during his hospital stay.  Echo Doppler examination done October 7 during hospitalization was limited and revealed physiologic mitral valve regurgitation, moderate tricuspid valve regurgitation, normal left ventricular function with estimated ejection fraction 65%. Electrocardiogram done December 2, 2022 demonstrated sinus bradycardia rate of 55 bpm is otherwise remarkable for 1 atrial premature contraction. The patient was also complaining of fatigue.  Patient understands he must follow-up with his pulmonologist regarding his pulmonary emboli, he had an episode of atrial fibrillation which according to his wife is the first episode of such arrhythmia.  This episode of atrial fibrillation may have been secondary to a pulmonary emboli. The patient developed COVID-19 infection after hospital discharge.  I have also recommended a follow-up with the electrophysiology team.  The patient understands that aerobic exercises must be increased to 40 minutes 4 times per week. A detailed discussion of lifestyle modification was done today. The patient has a good understanding of the diagnosis, and treatment plan. Lifestyle modification was also outlined.

## 2024-09-27 NOTE — PROGRESS NOTE ADULT - PROBLEM SELECTOR PLAN 9
Chronic, on home Finasteride 5 mg QD  - on last admission CT A/P: Heterogeneously enlarged prostate, cause mass effect and protrusion at the bladder base.   - recommended to follow up outpt with workup to exclude underlying prostatic malignancy. No assistance needed

## 2025-03-17 ENCOUNTER — LABORATORY RESULT (OUTPATIENT)
Age: 89
End: 2025-03-17

## 2025-03-17 ENCOUNTER — RX RENEWAL (OUTPATIENT)
Age: 89
End: 2025-03-17

## 2025-03-24 ENCOUNTER — APPOINTMENT (OUTPATIENT)
Dept: CARDIOLOGY | Facility: CLINIC | Age: 89
End: 2025-03-24
Payer: MEDICARE

## 2025-03-24 ENCOUNTER — NON-APPOINTMENT (OUTPATIENT)
Age: 89
End: 2025-03-24

## 2025-03-24 VITALS
WEIGHT: 218 LBS | OXYGEN SATURATION: 97 % | DIASTOLIC BLOOD PRESSURE: 80 MMHG | SYSTOLIC BLOOD PRESSURE: 126 MMHG | RESPIRATION RATE: 16 BRPM | HEIGHT: 72 IN | BODY MASS INDEX: 29.53 KG/M2 | TEMPERATURE: 98.2 F | HEART RATE: 60 BPM

## 2025-03-24 DIAGNOSIS — E78.5 HYPERLIPIDEMIA, UNSPECIFIED: ICD-10-CM

## 2025-03-24 DIAGNOSIS — I65.29 OCCLUSION AND STENOSIS OF UNSPECIFIED CAROTID ARTERY: ICD-10-CM

## 2025-03-24 DIAGNOSIS — R01.1 CARDIAC MURMUR, UNSPECIFIED: ICD-10-CM

## 2025-03-24 DIAGNOSIS — I48.91 UNSPECIFIED ATRIAL FIBRILLATION: ICD-10-CM

## 2025-03-24 PROCEDURE — 93000 ELECTROCARDIOGRAM COMPLETE: CPT

## 2025-03-24 PROCEDURE — 99214 OFFICE O/P EST MOD 30 MIN: CPT

## 2025-03-24 PROCEDURE — G2211 COMPLEX E/M VISIT ADD ON: CPT

## 2025-06-18 ENCOUNTER — RX RENEWAL (OUTPATIENT)
Age: 89
End: 2025-06-18

## 2025-09-19 DIAGNOSIS — I65.29 OCCLUSION AND STENOSIS OF UNSPECIFIED CAROTID ARTERY: ICD-10-CM

## 2025-09-19 DIAGNOSIS — Z00.00 ENCOUNTER FOR GENERAL ADULT MEDICAL EXAMINATION W/OUT ABNORMAL FINDINGS: ICD-10-CM

## 2025-09-19 DIAGNOSIS — R53.83 OTHER FATIGUE: ICD-10-CM

## 2025-09-19 DIAGNOSIS — E78.5 HYPERLIPIDEMIA, UNSPECIFIED: ICD-10-CM

## 2025-09-24 LAB
ANION GAP SERPL CALC-SCNC: 14 MMOL/L
BASOPHILS # BLD AUTO: 0.05 K/UL
BASOPHILS NFR BLD AUTO: 0.8 %
BUN SERPL-MCNC: 16 MG/DL
CALCIUM SERPL-MCNC: 9.5 MG/DL
CHLORIDE SERPL-SCNC: 107 MMOL/L
CHOLEST SERPL-MCNC: 116 MG/DL
CO2 SERPL-SCNC: 23 MMOL/L
CREAT SERPL-MCNC: 1.06 MG/DL
EGFRCR SERPLBLD CKD-EPI 2021: 67 ML/MIN/1.73M2
EOSINOPHIL # BLD AUTO: 0.06 K/UL
EOSINOPHIL NFR BLD AUTO: 1 %
ESTIMATED AVERAGE GLUCOSE: 114 MG/DL
GLUCOSE SERPL-MCNC: 95 MG/DL
HBA1C MFR BLD HPLC: 5.6 %
HCT VFR BLD CALC: 36.2 %
HDLC SERPL-MCNC: 36 MG/DL
HGB BLD-MCNC: 11.6 G/DL
IMM GRANULOCYTES NFR BLD AUTO: 0.2 %
LDLC SERPL DIRECT ASSAY-MCNC: 61 MG/DL
LDLC SERPL-MCNC: 58 MG/DL
LYMPHOCYTES # BLD AUTO: 2.37 K/UL
LYMPHOCYTES NFR BLD AUTO: 38.9 %
MAGNESIUM SERPL-MCNC: 2.1 MG/DL
MAN DIFF?: NORMAL
MCHC RBC-ENTMCNC: 30.2 PG
MCHC RBC-ENTMCNC: 32 G/DL
MCV RBC AUTO: 94.3 FL
MONOCYTES # BLD AUTO: 0.48 K/UL
MONOCYTES NFR BLD AUTO: 7.9 %
NEUTROPHILS # BLD AUTO: 3.12 K/UL
NEUTROPHILS NFR BLD AUTO: 51.2 %
NONHDLC SERPL-MCNC: 80 MG/DL
PLATELET # BLD AUTO: 161 K/UL
POTASSIUM SERPL-SCNC: 4.5 MMOL/L
RBC # BLD: 3.84 M/UL
RBC # FLD: 13.5 %
SODIUM SERPL-SCNC: 143 MMOL/L
TRIGL SERPL-MCNC: 120 MG/DL
TSH SERPL-ACNC: 2.9 UIU/ML
URATE SERPL-MCNC: 6.5 MG/DL
WBC # FLD AUTO: 6.09 K/UL